# Patient Record
Sex: FEMALE | Race: ASIAN | NOT HISPANIC OR LATINO | Employment: FULL TIME
[De-identification: names, ages, dates, MRNs, and addresses within clinical notes are randomized per-mention and may not be internally consistent; named-entity substitution may affect disease eponyms.]

---

## 2017-09-23 ENCOUNTER — HEALTH MAINTENANCE LETTER (OUTPATIENT)
Age: 27
End: 2017-09-23

## 2017-12-26 ENCOUNTER — OFFICE VISIT (OUTPATIENT)
Dept: INTERNAL MEDICINE | Facility: CLINIC | Age: 27
End: 2017-12-26
Payer: COMMERCIAL

## 2017-12-26 VITALS
OXYGEN SATURATION: 97 % | WEIGHT: 229.7 LBS | SYSTOLIC BLOOD PRESSURE: 120 MMHG | BODY MASS INDEX: 36.92 KG/M2 | TEMPERATURE: 98.4 F | DIASTOLIC BLOOD PRESSURE: 82 MMHG | HEART RATE: 88 BPM | HEIGHT: 66 IN

## 2017-12-26 DIAGNOSIS — Z13.1 SCREENING FOR DIABETES MELLITUS: ICD-10-CM

## 2017-12-26 DIAGNOSIS — D50.8 OTHER IRON DEFICIENCY ANEMIA: ICD-10-CM

## 2017-12-26 DIAGNOSIS — Z13.220 SCREENING FOR HYPERLIPIDEMIA: ICD-10-CM

## 2017-12-26 DIAGNOSIS — Z00.00 ENCOUNTER FOR ROUTINE ADULT HEALTH EXAMINATION WITHOUT ABNORMAL FINDINGS: Primary | ICD-10-CM

## 2017-12-26 DIAGNOSIS — N92.6 IRREGULAR PERIODS: ICD-10-CM

## 2017-12-26 DIAGNOSIS — E66.01 MORBID OBESITY DUE TO EXCESS CALORIES (H): ICD-10-CM

## 2017-12-26 DIAGNOSIS — F33.0 MILD RECURRENT MAJOR DEPRESSION (H): ICD-10-CM

## 2017-12-26 DIAGNOSIS — Z13.29 SCREENING FOR THYROID DISORDER: ICD-10-CM

## 2017-12-26 LAB
BASOPHILS # BLD AUTO: 0 10E9/L (ref 0–0.2)
BASOPHILS NFR BLD AUTO: 0.2 %
DIFFERENTIAL METHOD BLD: ABNORMAL
EOSINOPHIL # BLD AUTO: 0.4 10E9/L (ref 0–0.7)
EOSINOPHIL NFR BLD AUTO: 2.7 %
ERYTHROCYTE [DISTWIDTH] IN BLOOD BY AUTOMATED COUNT: 15.1 % (ref 10–15)
HBA1C MFR BLD: 5.4 % (ref 4.3–6)
HCT VFR BLD AUTO: 33.4 % (ref 35–47)
HGB BLD-MCNC: 10.9 G/DL (ref 11.7–15.7)
LYMPHOCYTES # BLD AUTO: 4.1 10E9/L (ref 0.8–5.3)
LYMPHOCYTES NFR BLD AUTO: 29.3 %
MCH RBC QN AUTO: 25.7 PG (ref 26.5–33)
MCHC RBC AUTO-ENTMCNC: 32.6 G/DL (ref 31.5–36.5)
MCV RBC AUTO: 79 FL (ref 78–100)
MONOCYTES # BLD AUTO: 1 10E9/L (ref 0–1.3)
MONOCYTES NFR BLD AUTO: 6.8 %
NEUTROPHILS # BLD AUTO: 8.5 10E9/L (ref 1.6–8.3)
NEUTROPHILS NFR BLD AUTO: 61 %
PLATELET # BLD AUTO: 479 10E9/L (ref 150–450)
RBC # BLD AUTO: 4.24 10E12/L (ref 3.8–5.2)
WBC # BLD AUTO: 13.9 10E9/L (ref 4–11)

## 2017-12-26 PROCEDURE — 84443 ASSAY THYROID STIM HORMONE: CPT | Performed by: INTERNAL MEDICINE

## 2017-12-26 PROCEDURE — 83540 ASSAY OF IRON: CPT | Performed by: INTERNAL MEDICINE

## 2017-12-26 PROCEDURE — 83550 IRON BINDING TEST: CPT | Performed by: INTERNAL MEDICINE

## 2017-12-26 PROCEDURE — 36415 COLL VENOUS BLD VENIPUNCTURE: CPT | Performed by: INTERNAL MEDICINE

## 2017-12-26 PROCEDURE — 85025 COMPLETE CBC W/AUTO DIFF WBC: CPT | Performed by: INTERNAL MEDICINE

## 2017-12-26 PROCEDURE — 82728 ASSAY OF FERRITIN: CPT | Performed by: INTERNAL MEDICINE

## 2017-12-26 PROCEDURE — 80061 LIPID PANEL: CPT | Performed by: INTERNAL MEDICINE

## 2017-12-26 PROCEDURE — 83036 HEMOGLOBIN GLYCOSYLATED A1C: CPT | Performed by: INTERNAL MEDICINE

## 2017-12-26 PROCEDURE — 99395 PREV VISIT EST AGE 18-39: CPT | Performed by: INTERNAL MEDICINE

## 2017-12-26 NOTE — NURSING NOTE
"Chief Complaint   Patient presents with     Physical     Fasting.       Initial /82 (BP Location: Left arm, Patient Position: Sitting, Cuff Size: Adult Large)  Pulse 88  Temp 98.4  F (36.9  C) (Oral)  Ht 5' 6\" (1.676 m)  Wt 229 lb 11.2 oz (104.2 kg)  LMP 12/04/2017 (Exact Date)  SpO2 97%  BMI 37.07 kg/m2 Estimated body mass index is 37.07 kg/(m^2) as calculated from the following:    Height as of this encounter: 5' 6\" (1.676 m).    Weight as of this encounter: 229 lb 11.2 oz (104.2 kg).  Medication Reconciliation: complete   Zita Kerr MA      "

## 2017-12-26 NOTE — MR AVS SNAPSHOT
After Visit Summary   12/26/2017    Karen Orellana    MRN: [id number]           Patient Information     Date Of Birth          1990        Visit Information        Provider Department      12/26/2017 4:40 PM Chase Villafuerte MD [facility]        Today's Diagnoses     Other iron deficiency anemia    -  1    Screening for hyperlipidemia        Morbid obesity due to excess calories (H)        Screening for diabetes mellitus        Screening for thyroid disorder          Care Instructions                           Plantar Fasciitis   What is plantar fasciitis?   Plantar fasciitis is a painful inflammation of the bottom of the foot between the ball of the foot and the heel.   How does it occur?   There are several possible causes of plantar fasciitis, including:     wearing high heels     gaining weight     increased walking, standing, or stair-climbing   If you wear high-heeled shoes, including western-style boots, for long periods of time, the tough, tendonlike tissue of the bottom of your foot can become shorter. This layer of tissue is called fascia. Pain occurs when you stretch fascia that has shortened. This painful stretching might happen, for example, when you walk barefoot after getting out of bed in the morning.   If you gain weight, you might be more likely to have plantar fasciitis, especially if you walk a lot or  shoes with poor heel cushioning. Normally there is a pad of fatty tissue under your heel bone. Weight gain might break down this fat pad and cause heel pain.   Runners may get plantar fasciitis when they change their workout and increase their mileage or frequency of workouts. It can also occur with a change in exercise surface or terrain, or if your shoes are worn out and don't provide enough cushion for your heels.   If the arches of your foot are abnormally high or low, you are more likely to develop plantar fasciitis than if your arches are  normal.   What are the symptoms?   The main symptom of plantar fasciitis is heel pain when you walk. You may also feel pain when you stand and possibly even when you are resting. This pain typically occurs first thing in the morning after you get out of bed, when your foot is placed flat on the floor. The pain occurs because you are stretching the plantar fascia. The pain usually lessens with more walking, but you may have it again after periods of rest.   You may feel no pain when you are sleeping because the position of your feet during rest allows the fascia to shorten and relax.   How is it diagnosed?   Your healthcare provider will ask about your symptoms. He or she will ask if the bottom of your heel is tender and if you have pain when you stretch the bottom of your foot. An X-ray of your heel may be done.   How is it treated?     Give your painful heel lots of rest. You may need to stay completely off your foot for several days when the pain is severe.     Your healthcare provider may recommend or prescribe anti-inflammatory medicines, such as aspirin or ibuprofen. These drugs decrease pain and inflammation. Adults aged 65 years and older should not take non-steroidal anti-inflammatory medicine for more than 7 days without their healthcare provider's approval. Resting your heel on an ice pack for a few minutes several times a day can also help.     Try to cushion your foot. You can do this by wearing athletic shoes, even at work, for awhile. Heel cushions can also be used. The cushions should be worn in both shoes. They are most helpful if you are overweight or an older adult.     Your provider may recommend special arch supports or inserts for your shoes called orthotics, either custom-made or off the shelf. These supports can be particularly helpful if you have flat feet or high arches.     Your provider may recommend an injection of a cortisone-like medicine.     Lose weight if needed.     A night splint  may be recommended. This will keep the plantar fascia stretched while you are sleeping.     Physical therapy for additional treatments may be recommended     Surgery is rarely needed.   How long will the effects last?   You may find that the pain is sometimes worse and sometimes better over time. If you get treatment soon after you notice the pain, the symptoms should stop after several weeks. If, however, you have had plantar fasciitis for a long time, it may take many weeks to months for the pain to go away.   When can I return to my normal activities?   Everyone recovers from an injury at a different rate. Return to your activities will be determined by how soon your foot recovers, not by how many days or weeks it has been since your injury has occurred. In general, the longer you have symptoms before you start treatment, the longer it will take to get better. The goal of rehabilitation is to return you to your normal activities as soon as is safely possible. If you return too soon you may worsen your injury.   You may safely return to your activities when, starting from the top of the list and progressing to the end, each of the following is true:     You have full range of motion in the injured foot compared with the uninjured foot.     You have full strength of the injured foot compared with the uninjured foot.     You can walk straight ahead without significant pain or limping.   How can I prevent plantar fasciitis?   The best way to prevent plantar fasciitis is to wear shoes that are well made and fit your feet. This is especially important when you exercise or walk a lot or stand for a long time on hard surfaces. Get new athletic shoes before your old shoes stop supporting and cushioning your feet.   You should also:     Avoid repeated jarring to the heel.     Keep a healthy weight.     Do your leg and foot stretching exercises regularly.   Developed by [employer].   Published by [employer].   Last  modified: 2008-08-11   Last reviewed: 2008-07-07   This content is reviewed periodically and is subject to change as new health information becomes available. The information is intended to inform and educate and is not a replacement for medical evaluation, advice, diagnosis or treatment by a healthcare professional.   Sports Medicine Advisor 2009.1 Index  Sports Medicine Advisor 2009.1 Credits     2009 [facility] and/or its affiliates. All Rights Reserved.               Plantar Fasciitis Rehabilitation Exercises   You may begin exercising the muscles of your foot right away by gently stretching them as follows:     Prone hip extension: Lie on your stomach with your legs straight out behind you. Tighten the buttocks and thigh muscles of your injured leg and lift it off the floor about 8 inches. Keep your knee straight. Hold for 5 seconds. Then lower your leg and relax. Do 3 sets of 10.     Towel stretch: Sit on a hard surface with one leg stretched out in front of you. Loop a towel around your toes and the ball of your foot and pull the towel toward your body keeping your knee straight. Hold this position for 15 to 30 seconds then relax. Repeat 3 times.   When the towel stretch becomes too easy, you may begin doing the standing calf stretch.     Standing calf stretch: Facing a wall, put your hands against the wall at about eye level. Keep one leg back with the heel on the floor, and the other leg forward. Turn your back foot slightly inward (as if you were pigeon-toed) as you slowly lean into the wall until you feel a stretch in the back of your calf. Hold for 15 to 30 seconds. Repeat 3 times and then switch the position of your legs and repeat the exercise 3 times. Do this exercise several times each day.     Sitting plantar fascia stretch: Sit in a chair and cross one foot over your other knee. Grab the base of your toes and pull them back toward your leg until you feel a comfortable stretch. Hold 15 seconds and  repeat 3 times.   When you can stand comfortably on your injured foot, you can begin standing to stretch the bottom of your foot using the plantar fascia stretch.     Achilles stretch: Stand with the ball of one foot on a stair. Reach for the bottom step with your heel until you feel a stretch in the arch of your foot. Hold this position for 15 to 30 seconds and then relax. Repeat 3 times.   After you have stretched the bottom muscles of your foot, you can begin strengthening the top muscles of your foot.     Frozen can roll: Roll your bare injured foot back and forth from your heel to your mid-arch over a frozen juice can. Repeat for 3 to 5 minutes. This exercise is particularly helpful if done first thing in the morning.     Towel pickup: With your heel on the ground,  a towel with your toes. Release. Repeat 10 to 20 times. When this gets easy, add more resistance by placing a book or small weight on the towel.     Balance and reach exercises   Stand upright next to a chair with your injured leg farthest from the chair. This will provide you with support if you need it. Stand just on the foot of your injured leg. Try to raise the arch of this foot while keeping your toes on the floor.   A. Keep your foot in this position and reach forward in front of you with the hand farthest away from the chair, allowing your knee to bend. Repeat this 10 times while maintaining the arch height. This exercise can be made more difficult by reaching farther in front of you. Do 2 sets.   B.  the same position as above. While maintaining your arch height, reach the hand farthest away from the chair across your body toward the chair. The farther you reach, the more challenging the exercise. Do 2 sets of 10.     Heel raise: Balance yourself while standing behind a chair or counter. Using the chair to help you, raise your body up onto your toes and hold for 5 seconds. Then slowly lower yourself down without holding onto  the chair. Hold onto the chair or counter if you need to. When this exercise becomes less painful, try lowering on one leg only. Repeat 10 times. Do 3 sets of 10.     Side-lying leg lift: Lying on your uninjured side, tighten the front thigh muscles on your top leg and lift that leg 8 to 10 inches away from the other leg. Keep the leg straight and lower slowly. Do 3 sets of 10.   Written by Christie Hickman, MS, PT, and Iveth Fonseca PT, [facility], [facility], for [employer].   Published by [employer].   Last modified: 2009-02-09   Last reviewed: 2008-07-07   This content is reviewed periodically and is subject to change as new health information becomes available. The information is intended to inform and educate and is not a replacement for medical evaluation, advice, diagnosis or treatment by a healthcare professional.   Sports Medicine Advisor 2009.1 Index                    Preventive Health Recommendations  Female Ages 26 - 39  Yearly exam:   See your health care provider every year in order to    Review health changes.     Discuss preventive care.      Review your medicines if you your doctor has prescribed any.    Until age 30: Get a Pap test every three years (more often if you have had an abnormal result).    After age 30: Talk to your doctor about whether you should have a Pap test every 3 years or have a Pap test with HPV screening every 5 years.   You do not need a Pap test if your uterus was removed (hysterectomy) and you have not had cancer.  You should be tested each year for STDs (sexually transmitted diseases), if you're at risk.   Talk to your provider about how often to have your cholesterol checked.  If you are at risk for diabetes, you should have a diabetes test (fasting glucose).  Shots: Get a flu shot each year. Get a tetanus shot every 10 years.   Nutrition:     Eat at least 5 servings of fruits and vegetables each day.    Eat whole-grain bread, whole-wheat pasta and brown rice instead of white grains  and rice.    Talk to your provider about Calcium and Vitamin D.     Lifestyle    Exercise at least 150 minutes a week (30 minutes a day, 5 days of the week). This will help you control your weight and prevent disease.    Limit alcohol to one drink per day.    No smoking.     Wear sunscreen to prevent skin cancer.    See your dentist every six months for an exam and cleaning.            Follow-ups after your visit        Additional Services     NUTRITION REFERRAL       Your provider has referred you to: [facility]: [facility] [phone]   [email or web address]    Please be aware that coverage of these services is subject to the terms and limitations of your health insurance plan.  Call member services at your health plan with any benefit or coverage questions.      Please bring the following with you to your appointment:    (1) This referral request  (2) Any documents given to you regarding this referral  (3) Any specific questions you have about diet and/or food choices                  Who to contact     If you have questions or need follow up information about today's clinic visit or your schedule please contact [facility] directly at [phone].  Normal or non-critical lab and imaging results will be communicated to you by MyChart, letter or phone within 4 business days after the clinic has received the results. If you do not hear from us within 7 days, please contact the clinic through [employer] or phone. If you have a critical or abnormal lab result, we will notify you by phone as soon as possible.  Submit refill requests through [employer] or call your pharmacy and they will forward the refill request to us. Please allow 3 business days for your refill to be completed.          Additional Information About Your Visit        [employer] Information     [employer] gives you secure access to your electronic health record. If you see a primary care provider,  "you can also send messages to your care team and make appointments. If you have questions, please call your primary care clinic.  If you do not have a primary care provider, please call [phone] and they will assist you.        Care EveryWhere ID     This is your Care EveryWhere ID. This could be used by other organizations to access your [facility] medical records  [id number]        Your Vitals Were     Pulse Temperature Height Last Period Pulse Oximetry BMI (Body Mass Index)    88 98.4  F (36.9  C) (Oral) 5' 6\" (1.676 m) 12/04/2017 (Exact Date) 97% 37.07 kg/m2       Blood Pressure from Last 3 Encounters:   12/26/17 120/82   09/01/16 120/84   08/03/16 120/82    Weight from Last 3 Encounters:   12/26/17 229 lb 11.2 oz (104.2 kg)   08/03/16 235 lb (106.6 kg)   01/06/15 212 lb 11.2 oz (96.5 kg)              We Performed the Following     CBC with platelets differential     Ferritin     Hemoglobin A1c     Iron and iron binding capacity     Lipid panel reflex to direct LDL Fasting     NUTRITION REFERRAL     TSH with free T4 reflex        Primary Care Provider Office Phone # Fax #    Nathaniel Cam [phone]       XXX RESIGNED  [address] [facility] [address]  [facility] [address]        Equal Access to Services     YVETTE CLEARY : Hadii aad ku hadasho Soomaali, waaxda luqadaha, qaybta kaalmada adeegyada, tawanda dash. So [facility] [phone].    ATENCIÓN: Si habla español, tiene a velazquez disposición servicios gratuitos de asistencia lingüística. Jonn al [phone].    We comply with applicable federal civil rights laws and [place] laws. We do not discriminate on the basis of race, color, national origin, age, disability, sex, sexual orientation, or gender identity.            Thank you!     Thank you for choosing [facility]  for your care. Our goal is always to provide you with excellent care. Hearing back from our patients is one way we can continue to " improve our services. Please take a few minutes to complete the written survey that you may receive in the mail after your visit with us. Thank you!             Your Updated Medication List - Protect others around you: Learn how to safely use, store and throw away your medicines at www.disposemymeds.org.          This list is accurate as of: 12/26/17  5:30 PM.  Always use your most recent med list.                   Brand Name Dispense Instructions for use Diagnosis    NO ACTIVE MEDICATIONS       Need for prophylactic vaccination and inoculation against influenza

## 2017-12-26 NOTE — PATIENT INSTRUCTIONS
Plantar Fasciitis   What is plantar fasciitis?   Plantar fasciitis is a painful inflammation of the bottom of the foot between the ball of the foot and the heel.   How does it occur?   There are several possible causes of plantar fasciitis, including:     wearing high heels     gaining weight     increased walking, standing, or stair-climbing   If you wear high-heeled shoes, including western-style boots, for long periods of time, the tough, tendonlike tissue of the bottom of your foot can become shorter. This layer of tissue is called fascia. Pain occurs when you stretch fascia that has shortened. This painful stretching might happen, for example, when you walk barefoot after getting out of bed in the morning.   If you gain weight, you might be more likely to have plantar fasciitis, especially if you walk a lot or  shoes with poor heel cushioning. Normally there is a pad of fatty tissue under your heel bone. Weight gain might break down this fat pad and cause heel pain.   Runners may get plantar fasciitis when they change their workout and increase their mileage or frequency of workouts. It can also occur with a change in exercise surface or terrain, or if your shoes are worn out and don't provide enough cushion for your heels.   If the arches of your foot are abnormally high or low, you are more likely to develop plantar fasciitis than if your arches are normal.   What are the symptoms?   The main symptom of plantar fasciitis is heel pain when you walk. You may also feel pain when you stand and possibly even when you are resting. This pain typically occurs first thing in the morning after you get out of bed, when your foot is placed flat on the floor. The pain occurs because you are stretching the plantar fascia. The pain usually lessens with more walking, but you may have it again after periods of rest.   You may feel no pain when you are sleeping because the position of your feet  during rest allows the fascia to shorten and relax.   How is it diagnosed?   Your healthcare provider will ask about your symptoms. He or she will ask if the bottom of your heel is tender and if you have pain when you stretch the bottom of your foot. An X-ray of your heel may be done.   How is it treated?     Give your painful heel lots of rest. You may need to stay completely off your foot for several days when the pain is severe.     Your healthcare provider may recommend or prescribe anti-inflammatory medicines, such as aspirin or ibuprofen. These drugs decrease pain and inflammation. Adults aged 65 years and older should not take non-steroidal anti-inflammatory medicine for more than 7 days without their healthcare provider's approval. Resting your heel on an ice pack for a few minutes several times a day can also help.     Try to cushion your foot. You can do this by wearing athletic shoes, even at work, for awhile. Heel cushions can also be used. The cushions should be worn in both shoes. They are most helpful if you are overweight or an older adult.     Your provider may recommend special arch supports or inserts for your shoes called orthotics, either custom-made or off the shelf. These supports can be particularly helpful if you have flat feet or high arches.     Your provider may recommend an injection of a cortisone-like medicine.     Lose weight if needed.     A night splint may be recommended. This will keep the plantar fascia stretched while you are sleeping.     Physical therapy for additional treatments may be recommended     Surgery is rarely needed.   How long will the effects last?   You may find that the pain is sometimes worse and sometimes better over time. If you get treatment soon after you notice the pain, the symptoms should stop after several weeks. If, however, you have had plantar fasciitis for a long time, it may take many weeks to months for the pain to go away.   When can I return to  my normal activities?   Everyone recovers from an injury at a different rate. Return to your activities will be determined by how soon your foot recovers, not by how many days or weeks it has been since your injury has occurred. In general, the longer you have symptoms before you start treatment, the longer it will take to get better. The goal of rehabilitation is to return you to your normal activities as soon as is safely possible. If you return too soon you may worsen your injury.   You may safely return to your activities when, starting from the top of the list and progressing to the end, each of the following is true:     You have full range of motion in the injured foot compared with the uninjured foot.     You have full strength of the injured foot compared with the uninjured foot.     You can walk straight ahead without significant pain or limping.   How can I prevent plantar fasciitis?   The best way to prevent plantar fasciitis is to wear shoes that are well made and fit your feet. This is especially important when you exercise or walk a lot or stand for a long time on hard surfaces. Get new athletic shoes before your old shoes stop supporting and cushioning your feet.   You should also:     Avoid repeated jarring to the heel.     Keep a healthy weight.     Do your leg and foot stretching exercises regularly.   Developed by [employer].   Published by [employer].   Last modified: 2008-08-11   Last reviewed: 2008-07-07   This content is reviewed periodically and is subject to change as new health information becomes available. The information is intended to inform and educate and is not a replacement for medical evaluation, advice, diagnosis or treatment by a healthcare professional.   Sports Medicine Advisor 2009.1 Index  Sports Medicine Advisor 2009.1 Credits     2009 [employer] and/or its affiliates. All Rights Reserved.               Plantar Fasciitis Rehabilitation Exercises   You may begin  exercising the muscles of your foot right away by gently stretching them as follows:     Prone hip extension: Lie on your stomach with your legs straight out behind you. Tighten the buttocks and thigh muscles of your injured leg and lift it off the floor about 8 inches. Keep your knee straight. Hold for 5 seconds. Then lower your leg and relax. Do 3 sets of 10.     Towel stretch: Sit on a hard surface with one leg stretched out in front of you. Loop a towel around your toes and the ball of your foot and pull the towel toward your body keeping your knee straight. Hold this position for 15 to 30 seconds then relax. Repeat 3 times.   When the towel stretch becomes too easy, you may begin doing the standing calf stretch.     Standing calf stretch: Facing a wall, put your hands against the wall at about eye level. Keep one leg back with the heel on the floor, and the other leg forward. Turn your back foot slightly inward (as if you were pigeon-toed) as you slowly lean into the wall until you feel a stretch in the back of your calf. Hold for 15 to 30 seconds. Repeat 3 times and then switch the position of your legs and repeat the exercise 3 times. Do this exercise several times each day.     Sitting plantar fascia stretch: Sit in a chair and cross one foot over your other knee. Grab the base of your toes and pull them back toward your leg until you feel a comfortable stretch. Hold 15 seconds and repeat 3 times.   When you can stand comfortably on your injured foot, you can begin standing to stretch the bottom of your foot using the plantar fascia stretch.     Achilles stretch: Stand with the ball of one foot on a stair. Reach for the bottom step with your heel until you feel a stretch in the arch of your foot. Hold this position for 15 to 30 seconds and then relax. Repeat 3 times.   After you have stretched the bottom muscles of your foot, you can begin strengthening the top muscles of your foot.     Frozen can roll: Roll  your bare injured foot back and forth from your heel to your mid-arch over a frozen juice can. Repeat for 3 to 5 minutes. This exercise is particularly helpful if done first thing in the morning.     Towel pickup: With your heel on the ground,  a towel with your toes. Release. Repeat 10 to 20 times. When this gets easy, add more resistance by placing a book or small weight on the towel.     Balance and reach exercises   Stand upright next to a chair with your injured leg farthest from the chair. This will provide you with support if you need it. Stand just on the foot of your injured leg. Try to raise the arch of this foot while keeping your toes on the floor.   A. Keep your foot in this position and reach forward in front of you with the hand farthest away from the chair, allowing your knee to bend. Repeat this 10 times while maintaining the arch height. This exercise can be made more difficult by reaching farther in front of you. Do 2 sets.   B.  the same position as above. While maintaining your arch height, reach the hand farthest away from the chair across your body toward the chair. The farther you reach, the more challenging the exercise. Do 2 sets of 10.     Heel raise: Balance yourself while standing behind a chair or counter. Using the chair to help you, raise your body up onto your toes and hold for 5 seconds. Then slowly lower yourself down without holding onto the chair. Hold onto the chair or counter if you need to. When this exercise becomes less painful, try lowering on one leg only. Repeat 10 times. Do 3 sets of 10.     Side-lying leg lift: Lying on your uninjured side, tighten the front thigh muscles on your top leg and lift that leg 8 to 10 inches away from the other leg. Keep the leg straight and lower slowly. Do 3 sets of 10.   Written by Christie Hickman, MS, PT, and Iveth Fonseca PT, [facility], OCS, for [employer].   Published by [employer].   Last modified: 2009-02-09   Last  reviewed: 2008-07-07   This content is reviewed periodically and is subject to change as new health information becomes available. The information is intended to inform and educate and is not a replacement for medical evaluation, advice, diagnosis or treatment by a healthcare professional.   Sports Medicine Advisor 2009.1 Index                    Preventive Health Recommendations  Female Ages 26 - 39  Yearly exam:   See your health care provider every year in order to    Review health changes.     Discuss preventive care.      Review your medicines if you your doctor has prescribed any.    Until age 30: Get a Pap test every three years (more often if you have had an abnormal result).    After age 30: Talk to your doctor about whether you should have a Pap test every 3 years or have a Pap test with HPV screening every 5 years.   You do not need a Pap test if your uterus was removed (hysterectomy) and you have not had cancer.  You should be tested each year for STDs (sexually transmitted diseases), if you're at risk.   Talk to your provider about how often to have your cholesterol checked.  If you are at risk for diabetes, you should have a diabetes test (fasting glucose).  Shots: Get a flu shot each year. Get a tetanus shot every 10 years.   Nutrition:     Eat at least 5 servings of fruits and vegetables each day.    Eat whole-grain bread, whole-wheat pasta and brown rice instead of white grains and rice.    Talk to your provider about Calcium and Vitamin D.     Lifestyle    Exercise at least 150 minutes a week (30 minutes a day, 5 days of the week). This will help you control your weight and prevent disease.    Limit alcohol to one drink per day.    No smoking.     Wear sunscreen to prevent skin cancer.    See your dentist every six months for an exam and cleaning.

## 2017-12-27 LAB
CHOLEST SERPL-MCNC: 155 MG/DL
FERRITIN SERPL-MCNC: 8 NG/ML (ref 12–150)
HDLC SERPL-MCNC: 54 MG/DL
IRON SATN MFR SERPL: 7 % (ref 15–46)
IRON SERPL-MCNC: 33 UG/DL (ref 35–180)
LDLC SERPL CALC-MCNC: 89 MG/DL
NONHDLC SERPL-MCNC: 101 MG/DL
TIBC SERPL-MCNC: 470 UG/DL (ref 240–430)
TRIGL SERPL-MCNC: 62 MG/DL
TSH SERPL DL<=0.005 MIU/L-ACNC: 1.08 MU/L (ref 0.4–4)

## 2018-02-20 ENCOUNTER — TELEPHONE (OUTPATIENT)
Dept: INTERNAL MEDICINE | Facility: CLINIC | Age: 28
End: 2018-02-20

## 2018-02-20 NOTE — TELEPHONE ENCOUNTER
Panel Management Review      Patient has the following on her problem list: None      Composite cancer screening  Chart review shows that this patient is due/due soon for the following Pap Smear  Summary:    Patient is due/failing the following:   PAP    Action needed:   Patient needs office visit for Pap Smear.    Type of outreach:    Patient's VM is full, can't leave message.    Questions for provider review:    None                                                                                                                                    Zita Kerr MA       Chart routed to Care Team .

## 2018-02-20 NOTE — LETTER
[facility]  [address], [address]  [place] [address]  [phone]        August 22, 2018    Karen Orellana  [address] [address] [facility] [address]            Dear Ms. Karen Orellana:    We sent you a letter a couple of weeks ago informing you of health maintenance that is due. We hope that you received it. This letter is just a follow up to remind you to schedule an appointment.         Sincerely,        Your [facility] Care Team

## 2018-02-20 NOTE — LETTER
[facility]  [address], [address]  [place] [address]  [phone]        July 26, 2018    Karen Orellana  [address] [address] [address]            Dear Ms. Karen Orellana:    In order to ensure we are providing the best quality care, we have reviewed your chart and see that you are due for:    1. Physical   2. Pap      Please call the clinic at your earliest convenience to schedule an appointment.   Thank you for trusting us with your health care.    Sincerely,        Dr. Chase Villafuerte

## 2018-03-12 ENCOUNTER — OFFICE VISIT (OUTPATIENT)
Dept: INTERNAL MEDICINE | Facility: CLINIC | Age: 28
End: 2018-03-12
Payer: COMMERCIAL

## 2018-03-12 VITALS
OXYGEN SATURATION: 100 % | WEIGHT: 232.1 LBS | BODY MASS INDEX: 37.3 KG/M2 | DIASTOLIC BLOOD PRESSURE: 76 MMHG | HEIGHT: 66 IN | SYSTOLIC BLOOD PRESSURE: 112 MMHG | TEMPERATURE: 98.6 F | HEART RATE: 100 BPM

## 2018-03-12 DIAGNOSIS — R31.9 HEMATURIA, UNSPECIFIED TYPE: Primary | ICD-10-CM

## 2018-03-12 DIAGNOSIS — Z12.4 ENCOUNTER FOR PAPANICOLAOU SMEAR FOR CERVICAL CANCER SCREENING: ICD-10-CM

## 2018-03-12 DIAGNOSIS — D50.0 IRON DEFICIENCY ANEMIA DUE TO CHRONIC BLOOD LOSS: ICD-10-CM

## 2018-03-12 DIAGNOSIS — G43.009 MIGRAINE WITHOUT AURA AND WITHOUT STATUS MIGRAINOSUS, NOT INTRACTABLE: ICD-10-CM

## 2018-03-12 DIAGNOSIS — N89.8 VAGINAL DISCHARGE: ICD-10-CM

## 2018-03-12 LAB
ALBUMIN UR-MCNC: ABNORMAL MG/DL
APPEARANCE UR: CLEAR
BACTERIA #/AREA URNS HPF: ABNORMAL /HPF
BILIRUB UR QL STRIP: NEGATIVE
BLD GP AB SCN SERPL QL: NORMAL
BLOOD BANK CMNT PATIENT-IMP: NORMAL
COLOR UR AUTO: YELLOW
DAT IGG-SP REAG RBC-IMP: NORMAL
DAT POLY-SP REAG RBC QL: NORMAL
GLUCOSE UR STRIP-MCNC: NEGATIVE MG/DL
HGB UR QL STRIP: NEGATIVE
KETONES UR STRIP-MCNC: ABNORMAL MG/DL
LEUKOCYTE ESTERASE UR QL STRIP: NEGATIVE
MUCOUS THREADS #/AREA URNS LPF: PRESENT /LPF
NITRATE UR QL: NEGATIVE
NON-SQ EPI CELLS #/AREA URNS LPF: ABNORMAL /LPF
PH UR STRIP: 7 PH (ref 5–7)
RBC #/AREA URNS AUTO: ABNORMAL /HPF
RETICS # AUTO: 62.7 10E9/L (ref 25–95)
RETICS/RBC NFR AUTO: 1.5 % (ref 0.5–2)
SOURCE: ABNORMAL
SP GR UR STRIP: 1.02 (ref 1–1.03)
SPECIMEN SOURCE: NORMAL
UROBILINOGEN UR STRIP-ACNC: 1 EU/DL (ref 0.2–1)
WBC #/AREA URNS AUTO: ABNORMAL /HPF
WET PREP SPEC: NORMAL

## 2018-03-12 PROCEDURE — 82728 ASSAY OF FERRITIN: CPT | Performed by: INTERNAL MEDICINE

## 2018-03-12 PROCEDURE — 99214 OFFICE O/P EST MOD 30 MIN: CPT | Performed by: INTERNAL MEDICINE

## 2018-03-12 PROCEDURE — G0145 SCR C/V CYTO,THINLAYER,RESCR: HCPCS | Performed by: INTERNAL MEDICINE

## 2018-03-12 PROCEDURE — 85025 COMPLETE CBC W/AUTO DIFF WBC: CPT | Performed by: INTERNAL MEDICINE

## 2018-03-12 PROCEDURE — 36415 COLL VENOUS BLD VENIPUNCTURE: CPT | Performed by: INTERNAL MEDICINE

## 2018-03-12 PROCEDURE — 85060 BLOOD SMEAR INTERPRETATION: CPT | Performed by: PATHOLOGY

## 2018-03-12 PROCEDURE — 87210 SMEAR WET MOUNT SALINE/INK: CPT | Performed by: INTERNAL MEDICINE

## 2018-03-12 PROCEDURE — 86850 RBC ANTIBODY SCREEN: CPT | Performed by: INTERNAL MEDICINE

## 2018-03-12 PROCEDURE — 85045 AUTOMATED RETICULOCYTE COUNT: CPT | Performed by: INTERNAL MEDICINE

## 2018-03-12 PROCEDURE — 83010 ASSAY OF HAPTOGLOBIN QUANT: CPT | Performed by: INTERNAL MEDICINE

## 2018-03-12 PROCEDURE — 81001 URINALYSIS AUTO W/SCOPE: CPT | Performed by: INTERNAL MEDICINE

## 2018-03-12 PROCEDURE — 83615 LACTATE (LD) (LDH) ENZYME: CPT | Performed by: INTERNAL MEDICINE

## 2018-03-12 NOTE — MR AVS SNAPSHOT
After Visit Summary   3/12/2018    Karen Orellana    MRN: [id number]           Patient Information     Date Of Birth          1990        Visit Information        Provider Department      3/12/2018 3:45 PM Chase Villafuerte MD [facility]        Today's Diagnoses     Hematuria, unspecified type    -  1    Iron deficiency anemia due to chronic blood loss        Migraine without aura and without status migrainosus, not intractable        Encounter for Papanicolaou smear for cervical cancer screening        Vaginal discharge           Follow-ups after your visit        Additional Services     ONC/HEME ADULT REFERRAL       Your provider has referred you to: [facility]: Cancer Care/Hematology (All Cancer Related Services) - [place] [phone]   [email or web address]    Please be aware that coverage of these services is subject to the terms and limitations of your health insurance plan.  Call member services at your health plan with any benefit or coverage questions.      Please bring the following with you to your appointment:    (1) Any X-Rays, CTs or MRIs which have been performed.  Contact the facility where they were done to arrange for  prior to your scheduled appointment.   (2) List of current medications  (3) This referral request   (4) Any documents/labs given to you for this referral                  Who to contact     If you have questions or need follow up information about today's clinic visit or your schedule please contact [facility] directly at [phone].  Normal or non-critical lab and imaging results will be communicated to you by MyChart, letter or phone within 4 business days after the clinic has received the results. If you do not hear from us within 7 days, please contact the clinic through MyChart or phone. If you have a critical or abnormal lab result, we will notify you by phone as  "soon as possible.  Submit refill requests through Your.MD or call your pharmacy and they will forward the refill request to us. Please allow 3 business days for your refill to be completed.          Additional Information About Your Visit        [employer] Information     [employer] gives you secure access to your electronic health record. If you see a primary care provider, you can also send messages to your care team and make appointments. If you have questions, please call your primary care clinic.  If you do not have a primary care provider, please call [phone] and they will assist you.        Care EveryWhere ID     This is your Care EveryWhere ID. This could be used by other organizations to access your [facility] medical records  [id number]        Your Vitals Were     Pulse Temperature Height Last Period Pulse Oximetry BMI (Body Mass Index)    100 98.6  F (37  C) (Oral) 5' 6\" (1.676 m) 02/23/2018 (Exact Date) 100% 37.46 kg/m2       Blood Pressure from Last 3 Encounters:   03/12/18 112/76   12/26/17 120/82   09/01/16 120/84    Weight from Last 3 Encounters:   03/12/18 232 lb 1.6 oz (105.3 kg)   12/26/17 229 lb 11.2 oz (104.2 kg)   08/03/16 235 lb (106.6 kg)              We Performed the Following     *UA reflex to Microscopic and Culture ([place] [facility] (except [place] and [place])     Blood Morphology Pathologist Review     CBC with platelets differential     Direct antiglobulin test     Ferritin     Haptoglobin     Lactate Dehydrogenase     ONC/HEME ADULT REFERRAL     Pap imaged thin layer screen reflex to HPV if ASCUS - recommend age 25 - 29     Reticulocyte count     Wet prep          Today's Medication Changes          These changes are accurate as of 3/12/18  4:47 PM.  If you have any questions, ask your nurse or doctor.               Start taking these medicines.        Dose/Directions    aspirin-acetaminophen-caffeine 250-250-65 MG per tablet   Commonly known as:  EXCEDRIN MIGRAINE   Used " for:  Migraine without aura and without status migrainosus, not intractable   Started by:  Chase Villafuerte MD        Dose:  1 tablet   Take 1 tablet by mouth every 6 hours as needed for headaches   Quantity:  80 tablet   Refills:  1            Where to get your medicines      These medications were sent to [facility] [address] [facility] [address] AT [place] [address]  [address] [facility] [address]    Hours:  24-hours Phone:  [phone]     aspirin-acetaminophen-caffeine 250-250-65 MG per tablet                Primary Care Provider Office Phone # Fax #    Chase Villafuerte [phone]       [address]  [facility] [address]        Equal Access to Services     CAROLIN CLEARY : Jose Juan layo Sohannahali, waaxda luqadaha, qaybta kaalmada adeegyada, tawanda dash. So [facility] [phone].    ATENCIÓN: Si habla español, tiene a velazquez disposición servicios gratuitos de asistencia lingüística. [facility] [phone].    We comply with applicable federal civil rights laws and [place] laws. We do not discriminate on the basis of race, color, national origin, age, disability, sex, sexual orientation, or gender identity.            Thank you!     Thank you for choosing [facility]  for your care. Our goal is always to provide you with excellent care. Hearing back from our patients is one way we can continue to improve our services. Please take a few minutes to complete the written survey that you may receive in the mail after your visit with us. Thank you!             Your Updated Medication List - Protect others around you: Learn how to safely use, store and throw away your medicines at www.disposemymeds.org.          This list is accurate as of 3/12/18  4:47 PM.  Always use your most recent med list.                   Brand Name Dispense Instructions for use Diagnosis    aspirin-acetaminophen-caffeine 250-250-65 MG  per tablet    EXCEDRIN MIGRAINE    80 tablet    Take 1 tablet by mouth every 6 hours as needed for headaches    Migraine without aura and without status migrainosus, not intractable       ferrous sulfate 325 (65 FE) MG tablet    IRON    90 tablet    Take 1 tablet (325 mg) by mouth 3 times daily (with meals)    Iron deficiency anemia, unspecified iron deficiency anemia type       NO ACTIVE MEDICATIONS       Need for prophylactic vaccination and inoculation against influenza

## 2018-03-12 NOTE — NURSING NOTE
"Chief Complaint   Patient presents with     Headache     She has awful migraine headaches and blood in urine. Wants to discuss about the clots in her menstrual cycle.       Initial /76 (BP Location: Right arm, Patient Position: Sitting, Cuff Size: Adult Large)  Pulse 100  Temp 98.6  F (37  C) (Oral)  Ht 5' 6\" (1.676 m)  Wt 232 lb 1.6 oz (105.3 kg)  LMP 02/23/2018 (Exact Date)  SpO2 100%  BMI 37.46 kg/m2 Estimated body mass index is 37.46 kg/(m^2) as calculated from the following:    Height as of this encounter: 5' 6\" (1.676 m).    Weight as of this encounter: 232 lb 1.6 oz (105.3 kg).  Medication Reconciliation: complete   Zita Kerr MA      "

## 2018-03-12 NOTE — PROGRESS NOTES
SUBJECTIVE:   Karen Orellana is a 27 year old female who presents to clinic today for the following health issues:    Headaches  -Patient reports a protracted headache lasting >48 hours, debilitating, feeling nausea. Photophobia  -No vision changes.  No fevers. No recent uri  -No weakness or numbness  Left occipital is where its worse  This headache lasted >48 hours.  No relation to periods  She took tylenol and ibuprofen with some relief  -Some family hx of migraines  -Debilitating headache  -No other changes in stressors  -Caffeine intake:  2 cups of coffee cups. Plus green tea in the evening. No additional coffee, no energy drinks     Hematuria:  Patient reports blood in urine (in toilet bowl).   She was not on her period. This has been intermittent.    She does have history of LINDY with markedly low ferritin, but normal MCV.        Regular periods but irregular flow.     Hemoglobin A1C was 5.4%.       Problem list and histories reviewed & adjusted, as indicated.  Additional history: as documented    Patient Active Problem List   Diagnosis     Iron deficiency anemia     CARDIOVASCULAR SCREENING; LDL GOAL LESS THAN 160     Morbid obesity due to excess calories (H)     Migraine without aura and without status migrainosus, not intractable     History reviewed. No pertinent surgical history.    Social History   Substance Use Topics     Smoking status: Never Smoker     Smokeless tobacco: Never Used     Alcohol use Yes      Comment: 1-2 times per week     Family History   Problem Relation Age of Onset     DIABETES Father      DIABETES Paternal Grandmother      DIABETES Paternal Grandfather      DIABETES Maternal Uncle      DIABETES Maternal Aunt      Hypertension Maternal Grandmother      Breast Cancer Maternal Grandmother            Reviewed and updated as needed this visit by clinical staff  Tobacco  Allergies  Med Hx  Surg Hx  Fam Hx  Soc Hx      Reviewed and updated as needed this visit by  "Provider         ROS:  Constitutional, HEENT, cardiovascular, pulmonary, gi and gu systems are negative, except as otherwise noted.    OBJECTIVE:     /76 (BP Location: Right arm, Patient Position: Sitting, Cuff Size: Adult Large)  Pulse 100  Temp 98.6  F (37  C) (Oral)  Ht 5' 6\" (1.676 m)  Wt 232 lb 1.6 oz (105.3 kg)  LMP 02/23/2018 (Exact Date)  SpO2 100%  BMI 37.46 kg/m2  Body mass index is 37.46 kg/(m^2).  GENERAL: healthy, alert and no distress  NECK: no adenopathy, no asymmetry, masses, or scars and thyroid normal to palpation  RESP: lungs clear to auscultation - no rales, rhonchi or wheezes  CV: regular rate and rhythm, normal S1 S2, no S3 or S4, no murmur, click or rub, no peripheral edema and peripheral pulses strong  ABDOMEN: soft, nontender, no hepatosplenomegaly, no masses and bowel sounds normal  MS: no gross musculoskeletal defects noted, no edema  NEURO: Normal strength and tone, mentation intact and speech normal  Pelvic Exam:  Vulva: No external lesions, normal hair distribution, no adenopathy  Vagina: Moist, pink, no abnormal discharge, well rugated, no lesions  Cervix: Pap smear is taken, parous, smooth, pink, no visible lesions  Uterus: Normal size, anteverted, non-tender, mobile  Ovaries: No mass, non-tender, mobile      Diagnostic Test Results:  No results found for this or any previous visit (from the past 24 hour(s)).    ASSESSMENT/PLAN:       1. Hematuria, unspecified type    Unclear etiology.  Per UA I ordered no evidence of erythocytes.  I checked hemolysis labs and they also returned negative.  Her peripheral smear is more consistent with LINDY but patient reports no improvement with oral Fe supplementation.  Her ferritin continues to be low.  Will refer to Heme for further assessment, may be that she has some thalassemia underlying.       - Lactate Dehydrogenase  - Haptoglobin  - Reticulocyte count  - CBC with platelets differential  - Blood Morphology Pathologist Review  - " Direct antiglobulin test  - *UA reflex to Microscopic and Culture ([place] [facility] (except [place] and [place])  - ONC/HEME ADULT REFERRAL  - Urine Microscopic  - Direct antiglobulin test    2. Iron deficiency anemia due to chronic blood loss  See above  - Lactate Dehydrogenase  - Haptoglobin  - Reticulocyte count  - CBC with platelets differential  - Blood Morphology Pathologist Review  - Ferritin  - ONC/HEME ADULT REFERRAL    3. Migraine without aura and without status migrainosus, not intractable    See hpi, her history is very classic for migraines without aura.  Recommended Excedrin for her prn.  Also reduce caffeine intake.     - aspirin-acetaminophen-caffeine (EXCEDRIN MIGRAINE) 250-250-65 MG per tablet; Take 1 tablet by mouth every 6 hours as needed for headaches  Dispense: 80 tablet; Refill: 1    4. Encounter for Papanicolaou smear for cervical cancer screening  screen  - Pap imaged thin layer screen reflex to HPV if ASCUS - recommend age 25 - 29    5. Vaginal discharge    - Wet prep      Chase Villafuerte MD  [facility]

## 2018-03-13 LAB
COPATH REPORT: NORMAL
FERRITIN SERPL-MCNC: 11 NG/ML (ref 12–150)
LDH SERPL L TO P-CCNC: 149 U/L (ref 81–234)

## 2018-03-13 ASSESSMENT — PATIENT HEALTH QUESTIONNAIRE - PHQ9: SUM OF ALL RESPONSES TO PHQ QUESTIONS 1-9: 9

## 2018-03-14 LAB — HAPTOGLOB SERPL-MCNC: 192 MG/DL (ref 15–200)

## 2018-03-15 LAB
COPATH REPORT: NORMAL
PAP: NORMAL

## 2018-03-20 LAB
DIFFERENTIAL METHOD BLD: ABNORMAL
EOSINOPHIL # BLD AUTO: 0.2 10E9/L (ref 0–0.7)
EOSINOPHIL NFR BLD AUTO: 2 %
ERYTHROCYTE [DISTWIDTH] IN BLOOD BY AUTOMATED COUNT: 16 % (ref 10–15)
HCT VFR BLD AUTO: 32.5 % (ref 35–47)
HGB BLD-MCNC: 10.4 G/DL (ref 11.7–15.7)
LYMPHOCYTES # BLD AUTO: 2.3 10E9/L (ref 0.8–5.3)
LYMPHOCYTES NFR BLD AUTO: 21 %
MCH RBC QN AUTO: 24.9 PG (ref 26.5–33)
MCHC RBC AUTO-ENTMCNC: 32 G/DL (ref 31.5–36.5)
MCV RBC AUTO: 78 FL (ref 78–100)
MONOCYTES # BLD AUTO: 0.9 10E9/L (ref 0–1.3)
MONOCYTES NFR BLD AUTO: 8 %
NEUTROPHILS # BLD AUTO: 7.5 10E9/L (ref 1.6–8.3)
NEUTROPHILS NFR BLD AUTO: 69 %
PLATELET # BLD AUTO: 455 10E9/L (ref 150–450)
PLATELET # BLD EST: ABNORMAL 10*3/UL
RBC # BLD AUTO: 4.17 10E12/L (ref 3.8–5.2)
RBC MORPH BLD: ABNORMAL
WBC # BLD AUTO: 10.9 10E9/L (ref 4–11)

## 2018-04-06 ENCOUNTER — OFFICE VISIT (OUTPATIENT)
Dept: OBGYN | Facility: CLINIC | Age: 28
End: 2018-04-06
Payer: COMMERCIAL

## 2018-04-06 VITALS
DIASTOLIC BLOOD PRESSURE: 82 MMHG | SYSTOLIC BLOOD PRESSURE: 129 MMHG | WEIGHT: 235 LBS | BODY MASS INDEX: 37.93 KG/M2 | HEART RATE: 87 BPM

## 2018-04-06 DIAGNOSIS — N92.0 EXCESSIVE OR FREQUENT MENSTRUATION: Primary | ICD-10-CM

## 2018-04-06 DIAGNOSIS — N94.10 DYSPAREUNIA IN FEMALE: ICD-10-CM

## 2018-04-06 DIAGNOSIS — E28.2 PCOS (POLYCYSTIC OVARIAN SYNDROME): ICD-10-CM

## 2018-04-06 PROCEDURE — 99204 OFFICE O/P NEW MOD 45 MIN: CPT | Performed by: OBSTETRICS & GYNECOLOGY

## 2018-04-06 RX ORDER — DESOGESTREL AND ETHINYL ESTRADIOL 0.15-0.03
1 KIT ORAL DAILY
Qty: 84 TABLET | Refills: 3 | Status: SHIPPED | OUTPATIENT
Start: 2018-04-06 | End: 2018-05-24

## 2018-04-06 NOTE — NURSING NOTE
"Chief Complaint   Patient presents with     Abnormal Uterine Bleeding     Irregular heavy bleeding including clots x 2-3 months. C/o anxiety, headaches and nausea.        Initial /82  Pulse 87  Wt 235 lb (106.6 kg)  LMP 03/27/2018 (Exact Date)  Breastfeeding? No  BMI 37.93 kg/m2 Estimated body mass index is 37.93 kg/(m^2) as calculated from the following:    Height as of 3/12/18: 5' 6\" (1.676 m).    Weight as of this encounter: 235 lb (106.6 kg).  Medication Reconciliation: complete   Meg Spring LPN      "

## 2018-04-06 NOTE — PROGRESS NOTES
SUBJECTIVE:                                                   I was asked to see this patient today for consultation by Dr. Villafuerte for an opinion regarding abnormal uterine bleeding.    Karen Orellana is a 27 year old female  who presents to clinic today for heavy periods and iron deficiency anemia.    Menstrual cycles:    Menarche at age 11. Periods were heavy for 6 months then light.  Periods are regular q 28-30 days, lasting 2-3 days, without heavy bleeding at that time, just spotting, large clots in between bleeding, no pain. Changes a pad or tampon every 2 hours.   Dysmenorrhea none.   Cyclic symptoms include  none.   Additional symptoms include intermenstrual bleeding   Double protection: No  Birth control method: not sexually active.     STD History: No STD history  Galactorrhea: No  Hirsuitism: Yes  Intolerance to hot or cold: Yes cold  Significant change in weight within last year: Yes  History of abnormal pap: No        Problem list and histories reviewed & adjusted, as indicated.  Additional history: as documented.    Patient Active Problem List   Diagnosis     Iron deficiency anemia     CARDIOVASCULAR SCREENING; LDL GOAL LESS THAN 160     Morbid obesity due to excess calories (H)     Migraine without aura and without status migrainosus, not intractable     Past Surgical History:   Procedure Laterality Date     NO HISTORY OF SURGERY        Social History   Substance Use Topics     Smoking status: Never Smoker     Smokeless tobacco: Never Used     Alcohol use Yes      Comment: 1-2 times per week      Problem (# of Occurrences) Relation (Name,Age of Onset)    Asthma (1) Mother    Breast Cancer (1) Maternal Grandmother    DIABETES (5) Father, Paternal Grandmother, Paternal Grandfather, Maternal Uncle, Maternal Aunt    Hypertension (2) Mother, Maternal Grandmother    Hypoglycemia (1) Mother              Current Outpatient Prescriptions on File Prior to Visit:  aspirin-acetaminophen-caffeine  (EXCEDRIN MIGRAINE) 250-250-65 MG per tablet Take 1 tablet by mouth every 6 hours as needed for headaches   ferrous sulfate (IRON) 325 (65 FE) MG tablet Take 1 tablet (325 mg) by mouth 3 times daily (with meals)     No current facility-administered medications on file prior to visit.   Allergies   Allergen Reactions     No Known Drug Allergies        ROS:  General: POSITIVE for:, weight gain over last 4-6 months. Works out 5 days a week and not helping. Appetite is minimal alternating with extreme hunger, vomits 3 times a week. Anxiety contributes as well.  Resp: No coughing, wheezing or shortness of breath  CV: No chest pains or palpitations  GI: POSITIVE for:, nausea, vomiting, diarrhea on occasion more recently.  : No urinary frequency or dysuria, bladder or kidney problems  Neurologic: POSITIVE for:, Hx migraines  Psychiatric:  anxiety  Heme/immune/allergy: No history of bleeding or clotting problems or anemia.  No allergies or immune system problems  Endocrine: No history of thyroid disease, diabetes or other endocrine disorders  Skin: No rashes,worrisome lesions or skin problems      OBJECTIVE:     Exam:  /82  Pulse 87  Wt 235 lb (106.6 kg)  LMP 03/27/2018 (Exact Date)  Breastfeeding? No  BMI 37.93 kg/m2      Constitutional:  Appearance: Well nourished, well developed alert, in no acute distress  Chest:  Respiratory Effort:  Breathing unlabored  Cardiovascular: no edema.  Skin:General Inspection:  No rashes present, no lesions present, no areas of discoloration. Hirsutism evident on the jawline and chin.  Neurologic/Psychiatric:  Mental Status:  Oriented X3       In-Clinic Test Results:  No results found for this or any previous visit (from the past 24 hour(s)).    ASSESSMENT/PLAN:                                                        ICD-10-CM    1. Excessive or frequent menstruation N92.0 US Pelvic Complete with Transvaginal     desogestrel-ethinyl estradiol (APRI) 0.15-30 MG-MCG per tablet    2. PCOS (polycystic ovarian syndrome) E28.2 17 OH progesterone     DHEA sulfate     Estradiol     Insulin level     Glucose     Prolactin     TSH with free T4 reflex     desogestrel-ethinyl estradiol (APRI) 0.15-30 MG-MCG per tablet   3. Dyspareunia in female N94.10          Patient counselled on etiologies of abnormal bleeding, evaluation for etiology, options for treatment including hormonal management and progestin IUD. She is interested in oral contraceptive pills for birth control, and we had a long discussion regarding their use.    I think there is a good chance that she has PCOS. Discussed polycystic ovarian syndrome. Explained that polycystic ovary syndrome (PCOS) is an important cause of both menstrual irregularity and androgen excess in women. Discussed that it can cause irregular menstrual cycles, hirsutism, obesity, insulin resistance, and anovulatory infertility. Specifically explained that due to PCOS she may be at increased risk of type 2 diabetes, cardiovascular disease, endometrial hyperplasia, endometrial cancer, and infertility.   Counseled that diet and exercise for weight reduction are the first steps for overweight and obese women with PCOS. Weight loss can restore ovulatory cycles and improve metabolic risk. Also discussed that a lower carb diet is often beneficial. I recommended that she consider a modified and less strict version of Whole30 or Paleo, or a diabetic diet, with an emphasis on whole, unprocessed foods and high-quality carbohydrate sources, like vegetables, fruits, and complex, unprocessed grains. We will discuss this further at her follow up appointment, and also may consider referral to endocrine for weight management help.  Discussed that oral contraceptives (OCs) are the mainstay of pharmacologic therapy for women with PCOS for managing hyperandrogenism, treating menstrual dysfunction and protecting endometrial lining. Additionally, these provide contraception. As above,  she is interested in initiating oral contraceptive pills. Prescription given.  For women with PCOS who choose not to or cannot take OCs, we discussed alternative treatments for endometrial protection including intermittent or continuous progestin therapy, or a progestin-releasing intrauterine device (IUD). Depo provera, Nuvaring, the patch, and Nexplanon may also provide this protection in addition to contraception. Depo and Nexplanon would not be my first choices for her, however, given her obesity and hirsutism.  Additionally discussed metformin as an alternative that can restore ovulatory menses in some women with PCOS, and that this can be particularly beneficial in women with documented hyperinsulinemia.  Plan for now is labs (future orders placed) and pelvic ultrasound, then return appointment to see me for exam.  She also expressed that she has always had pain with every episode of intercourse since initiating intercourse at age 19. This has never gotten better. This is insertional and deep pain. 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 Discussed that pelvic pain of a chronic nature frequently is multifactorial in origin, and therefore often requires a multidisciplinary approach for optimal treatment. This may include in her case medication (hormonal and otherwise), physical therapy, pain clinic, and psychologic treatment. Because this develops over a period of time, there will not be an immediate recovery with any of these options over the short term. The goal is always to maximize function and minimize symptoms, to the extent that is possible. Consider referral to  physical therapy if PFTM is suspected based on her follow up exam.      Prescriptions provided: oral contraceptive pills     Today I spent 45 minutes with the patient, of which 40 minutes was spent reviewing PCOS,  dyspareunia, diet, history, and pathophysiology of the disease process.    Jen Henry MD  [facility]

## 2018-04-06 NOTE — MR AVS SNAPSHOT
After Visit Summary   4/6/2018    Karen Orellana    MRN: [id number]           Patient Information     Date Of Birth          1990        Visit Information        Provider Department      4/6/2018 3:00 PM Jen Henry MD [facility]        Today's Diagnoses     Excessive or frequent menstruation    -  1    PCOS (polycystic ovarian syndrome)          Care Instructions    Call [phone] to schedule your ultrasound.    Make an appointment to follow up with me for exam and test results in 4-6 weeks.              Follow-ups after your visit        Future tests that were ordered for you today     Open Future Orders        Priority Expected Expires Ordered    DHEA sulfate Routine  4/6/2019 4/6/2018    Estradiol Routine  10/5/2018 4/6/2018    Insulin level Routine  7/15/2018 4/6/2018    Glucose Routine  10/5/2018 4/6/2018    Prolactin Routine  10/5/2018 4/6/2018    TSH with free T4 reflex Routine  10/5/2018 4/6/2018    17 OH progesterone Routine  4/6/2019 4/6/2018    US Pelvic Complete with Transvaginal Routine  4/6/2019 4/6/2018            Who to contact     If you have questions or need follow up information about today's clinic visit or your schedule please contact [facility] directly at [phone].  Normal or non-critical lab and imaging results will be communicated to you by MyChart, letter or phone within 4 business days after the clinic has received the results. If you do not hear from us within 7 days, please contact the clinic through MyChart or phone. If you have a critical or abnormal lab result, we will notify you by phone as soon as possible.  Submit refill requests through [employer] or call your pharmacy and they will forward the refill request to us. Please allow 3 business days for your refill to be completed.          Additional Information About Your Visit        Health [employer] Information     [employer] gives you secure access to your  electronic health record. If you see a primary care provider, you can also send messages to your care team and make appointments. If you have questions, please call your primary care clinic.  If you do not have a primary care provider, please call [phone] and they will assist you.        Care EveryWhere ID     This is your Care EveryWhere ID. This could be used by other organizations to access your [facility] medical records  [id number]        Your Vitals Were     Pulse Last Period Breastfeeding? BMI (Body Mass Index)          87 03/27/2018 (Exact Date) No 37.93 kg/m2         Blood Pressure from Last 3 Encounters:   04/06/18 129/82   03/12/18 112/76   12/26/17 120/82    Weight from Last 3 Encounters:   04/06/18 235 lb (106.6 kg)   03/12/18 232 lb 1.6 oz (105.3 kg)   12/26/17 229 lb 11.2 oz (104.2 kg)                 Today's Medication Changes          These changes are accurate as of 4/6/18  4:08 PM.  If you have any questions, ask your nurse or doctor.               Start taking these medicines.        Dose/Directions    desogestrel-ethinyl estradiol 0.15-30 MG-MCG per tablet   Commonly known as:  APRI   Used for:  Excessive or frequent menstruation, PCOS (polycystic ovarian syndrome)   Started by:  Jen Henry MD        Dose:  1 tablet   Take 1 tablet by mouth daily   Quantity:  84 tablet   Refills:  3            Where to get your medicines      These medications were sent to [id number] - [facility] [address]  [address] [facility] [address]    Hours:  9Am-9Pm (M-F) 9Am-6Pm (S&S) Phone:  [phone]     desogestrel-ethinyl estradiol 0.15-30 MG-MCG per tablet                Primary Care Provider Office Phone # Fax #    Chase Villafuerte [phone]       [address]  [facility] [address]        Equal Access to Services     YVETTE CLEARY AH: Jose Juan Hoffmann, candida mckeon, tawanda schustern ah.  [facility] [phone].    ATENCIÓN: Si frank meng, tiene a velazquez disposición servicios gratuitos de asistencia lingüística. Jonn silveira [phone].    We comply with applicable federal civil rights laws and [place] laws. We do not discriminate on the basis of race, color, national origin, age, disability, sex, sexual orientation, or gender identity.            Thank you!     Thank you for choosing [facility]  for your care. Our goal is always to provide you with excellent care. Hearing back from our patients is one way we can continue to improve our services. Please take a few minutes to complete the written survey that you may receive in the mail after your visit with us. Thank you!             Your Updated Medication List - Protect others around you: Learn how to safely use, store and throw away your medicines at www.disposemymeds.org.          This list is accurate as of 4/6/18  4:08 PM.  Always use your most recent med list.                   Brand Name Dispense Instructions for use Diagnosis    aspirin-acetaminophen-caffeine 250-250-65 MG per tablet    EXCEDRIN MIGRAINE    80 tablet    Take 1 tablet by mouth every 6 hours as needed for headaches    Migraine without aura and without status migrainosus, not intractable       desogestrel-ethinyl estradiol 0.15-30 MG-MCG per tablet    APRI    84 tablet    Take 1 tablet by mouth daily    Excessive or frequent menstruation, PCOS (polycystic ovarian syndrome)       ferrous sulfate 325 (65 FE) MG tablet    IRON    90 tablet    Take 1 tablet (325 mg) by mouth 3 times daily (with meals)    Iron deficiency anemia, unspecified iron deficiency anemia type

## 2018-04-06 NOTE — PATIENT INSTRUCTIONS
Call [phone] to schedule your ultrasound.    Make an appointment to follow up with me for exam and test results in 4-6 weeks.

## 2018-04-10 DIAGNOSIS — D50.9 IRON DEFICIENCY ANEMIA, UNSPECIFIED IRON DEFICIENCY ANEMIA TYPE: ICD-10-CM

## 2018-04-10 DIAGNOSIS — E28.2 PCOS (POLYCYSTIC OVARIAN SYNDROME): ICD-10-CM

## 2018-04-10 LAB
BASOPHILS # BLD AUTO: 0.1 10E9/L (ref 0–0.2)
BASOPHILS NFR BLD AUTO: 0.4 %
DIFFERENTIAL METHOD BLD: ABNORMAL
EOSINOPHIL # BLD AUTO: 0.2 10E9/L (ref 0–0.7)
EOSINOPHIL NFR BLD AUTO: 1.8 %
ERYTHROCYTE [DISTWIDTH] IN BLOOD BY AUTOMATED COUNT: 15.8 % (ref 10–15)
HCT VFR BLD AUTO: 33.1 % (ref 35–47)
HGB BLD-MCNC: 10.7 G/DL (ref 11.7–15.7)
LYMPHOCYTES # BLD AUTO: 3.8 10E9/L (ref 0.8–5.3)
LYMPHOCYTES NFR BLD AUTO: 28.6 %
MCH RBC QN AUTO: 25.5 PG (ref 26.5–33)
MCHC RBC AUTO-ENTMCNC: 32.3 G/DL (ref 31.5–36.5)
MCV RBC AUTO: 79 FL (ref 78–100)
MONOCYTES # BLD AUTO: 0.9 10E9/L (ref 0–1.3)
MONOCYTES NFR BLD AUTO: 6.9 %
NEUTROPHILS # BLD AUTO: 8.2 10E9/L (ref 1.6–8.3)
NEUTROPHILS NFR BLD AUTO: 62.3 %
PLATELET # BLD AUTO: 449 10E9/L (ref 150–450)
RBC # BLD AUTO: 4.19 10E12/L (ref 3.8–5.2)
WBC # BLD AUTO: 13.1 10E9/L (ref 4–11)

## 2018-04-10 PROCEDURE — 83550 IRON BINDING TEST: CPT | Performed by: OBSTETRICS & GYNECOLOGY

## 2018-04-10 PROCEDURE — 82627 DEHYDROEPIANDROSTERONE: CPT | Performed by: OBSTETRICS & GYNECOLOGY

## 2018-04-10 PROCEDURE — 83525 ASSAY OF INSULIN: CPT | Performed by: OBSTETRICS & GYNECOLOGY

## 2018-04-10 PROCEDURE — 83540 ASSAY OF IRON: CPT | Performed by: OBSTETRICS & GYNECOLOGY

## 2018-04-10 PROCEDURE — 82670 ASSAY OF TOTAL ESTRADIOL: CPT | Performed by: OBSTETRICS & GYNECOLOGY

## 2018-04-10 PROCEDURE — 85025 COMPLETE CBC W/AUTO DIFF WBC: CPT | Performed by: OBSTETRICS & GYNECOLOGY

## 2018-04-10 PROCEDURE — 82947 ASSAY GLUCOSE BLOOD QUANT: CPT | Performed by: OBSTETRICS & GYNECOLOGY

## 2018-04-10 PROCEDURE — 84146 ASSAY OF PROLACTIN: CPT | Performed by: OBSTETRICS & GYNECOLOGY

## 2018-04-10 PROCEDURE — 82728 ASSAY OF FERRITIN: CPT | Performed by: OBSTETRICS & GYNECOLOGY

## 2018-04-10 PROCEDURE — 36415 COLL VENOUS BLD VENIPUNCTURE: CPT | Performed by: OBSTETRICS & GYNECOLOGY

## 2018-04-10 PROCEDURE — 83498 ASY HYDROXYPROGESTERONE 17-D: CPT | Performed by: OBSTETRICS & GYNECOLOGY

## 2018-04-10 PROCEDURE — 84443 ASSAY THYROID STIM HORMONE: CPT | Performed by: OBSTETRICS & GYNECOLOGY

## 2018-04-11 LAB
ESTRADIOL SERPL-MCNC: 97 PG/ML
FERRITIN SERPL-MCNC: 12 NG/ML (ref 12–150)
GLUCOSE SERPL-MCNC: 82 MG/DL (ref 70–99)
INSULIN SERPL-ACNC: 18.2 MU/L (ref 3–25)
IRON SATN MFR SERPL: 5 % (ref 15–46)
IRON SERPL-MCNC: 23 UG/DL (ref 35–180)
PROLACTIN SERPL-MCNC: 16 UG/L (ref 3–27)
TIBC SERPL-MCNC: 464 UG/DL (ref 240–430)
TSH SERPL DL<=0.005 MIU/L-ACNC: 2.33 MU/L (ref 0.4–4)

## 2018-04-12 LAB — DHEA-S SERPL-MCNC: 267 UG/DL (ref 35–430)

## 2018-04-16 LAB — 17OHP SERPL-MCNC: 46 NG/DL

## 2018-04-27 ENCOUNTER — RADIANT APPOINTMENT (OUTPATIENT)
Dept: ULTRASOUND IMAGING | Facility: CLINIC | Age: 28
End: 2018-04-27
Attending: OBSTETRICS & GYNECOLOGY
Payer: COMMERCIAL

## 2018-04-27 DIAGNOSIS — N92.0 EXCESSIVE OR FREQUENT MENSTRUATION: ICD-10-CM

## 2018-04-27 PROCEDURE — 76856 US EXAM PELVIC COMPLETE: CPT | Performed by: OBSTETRICS & GYNECOLOGY

## 2018-04-27 PROCEDURE — 76830 TRANSVAGINAL US NON-OB: CPT | Performed by: OBSTETRICS & GYNECOLOGY

## 2018-04-30 ENCOUNTER — ONCOLOGY VISIT (OUTPATIENT)
Dept: ONCOLOGY | Facility: CLINIC | Age: 28
End: 2018-04-30
Attending: INTERNAL MEDICINE
Payer: COMMERCIAL

## 2018-04-30 ENCOUNTER — HOSPITAL ENCOUNTER (OUTPATIENT)
Facility: CLINIC | Age: 28
Setting detail: SPECIMEN
Discharge: HOME | End: 2018-04-30
Attending: INTERNAL MEDICINE | Admitting: INTERNAL MEDICINE
Payer: COMMERCIAL

## 2018-04-30 VITALS
WEIGHT: 228 LBS | TEMPERATURE: 98 F | BODY MASS INDEX: 36.64 KG/M2 | DIASTOLIC BLOOD PRESSURE: 78 MMHG | OXYGEN SATURATION: 100 % | SYSTOLIC BLOOD PRESSURE: 117 MMHG | RESPIRATION RATE: 16 BRPM | HEART RATE: 66 BPM | HEIGHT: 66 IN

## 2018-04-30 DIAGNOSIS — D50.0 IRON DEFICIENCY ANEMIA DUE TO CHRONIC BLOOD LOSS: Primary | ICD-10-CM

## 2018-04-30 LAB
ALBUMIN SERPL-MCNC: 3.6 G/DL (ref 3.4–5)
ALP SERPL-CCNC: 67 U/L (ref 40–150)
ALT SERPL W P-5'-P-CCNC: 23 U/L (ref 0–50)
ANION GAP SERPL CALCULATED.3IONS-SCNC: 7 MMOL/L (ref 3–14)
AST SERPL W P-5'-P-CCNC: 16 U/L (ref 0–45)
BASOPHILS # BLD AUTO: 0.1 10E9/L (ref 0–0.2)
BASOPHILS NFR BLD AUTO: 0.6 %
BILIRUB SERPL-MCNC: 0.1 MG/DL (ref 0.2–1.3)
BUN SERPL-MCNC: 8 MG/DL (ref 7–30)
CALCIUM SERPL-MCNC: 8.7 MG/DL (ref 8.5–10.1)
CHLORIDE SERPL-SCNC: 106 MMOL/L (ref 94–109)
CO2 SERPL-SCNC: 24 MMOL/L (ref 20–32)
CREAT SERPL-MCNC: 0.68 MG/DL (ref 0.52–1.04)
DIFFERENTIAL METHOD BLD: ABNORMAL
EOSINOPHIL # BLD AUTO: 0.3 10E9/L (ref 0–0.7)
EOSINOPHIL NFR BLD AUTO: 2.7 %
ERYTHROCYTE [DISTWIDTH] IN BLOOD BY AUTOMATED COUNT: 15.6 % (ref 10–15)
FERRITIN SERPL-MCNC: 6 NG/ML (ref 12–150)
FOLATE SERPL-MCNC: 19.1 NG/ML
GFR SERPL CREATININE-BSD FRML MDRD: >90 ML/MIN/1.7M2
GLUCOSE SERPL-MCNC: 79 MG/DL (ref 70–99)
HCT VFR BLD AUTO: 33.6 % (ref 35–47)
HGB BLD-MCNC: 10.6 G/DL (ref 11.7–15.7)
IMM GRANULOCYTES # BLD: 0 10E9/L (ref 0–0.4)
IMM GRANULOCYTES NFR BLD: 0.3 %
IRON SATN MFR SERPL: 4 % (ref 15–46)
IRON SERPL-MCNC: 17 UG/DL (ref 35–180)
LYMPHOCYTES # BLD AUTO: 4 10E9/L (ref 0.8–5.3)
LYMPHOCYTES NFR BLD AUTO: 41.8 %
MCH RBC QN AUTO: 25.2 PG (ref 26.5–33)
MCHC RBC AUTO-ENTMCNC: 31.5 G/DL (ref 31.5–36.5)
MCV RBC AUTO: 80 FL (ref 78–100)
MONOCYTES # BLD AUTO: 0.7 10E9/L (ref 0–1.3)
MONOCYTES NFR BLD AUTO: 7.4 %
NEUTROPHILS # BLD AUTO: 4.5 10E9/L (ref 1.6–8.3)
NEUTROPHILS NFR BLD AUTO: 47.2 %
NRBC # BLD AUTO: 0 10*3/UL
NRBC BLD AUTO-RTO: 0 /100
PLATELET # BLD AUTO: 499 10E9/L (ref 150–450)
POTASSIUM SERPL-SCNC: 3.9 MMOL/L (ref 3.4–5.3)
PROT SERPL-MCNC: 9.1 G/DL (ref 6.8–8.8)
RBC # BLD AUTO: 4.2 10E12/L (ref 3.8–5.2)
RETICS # AUTO: 63.8 10E9/L (ref 25–95)
RETICS/RBC NFR AUTO: 1.5 % (ref 0.5–2)
SODIUM SERPL-SCNC: 137 MMOL/L (ref 133–144)
TIBC SERPL-MCNC: 467 UG/DL (ref 240–430)
TRANSFERRIN SERPL-MCNC: 397 MG/DL (ref 210–360)
VIT B12 SERPL-MCNC: 297 PG/ML (ref 193–986)
WBC # BLD AUTO: 9.5 10E9/L (ref 4–11)

## 2018-04-30 PROCEDURE — 85025 COMPLETE CBC W/AUTO DIFF WBC: CPT | Performed by: INTERNAL MEDICINE

## 2018-04-30 PROCEDURE — 82668 ASSAY OF ERYTHROPOIETIN: CPT | Performed by: INTERNAL MEDICINE

## 2018-04-30 PROCEDURE — 82728 ASSAY OF FERRITIN: CPT | Performed by: INTERNAL MEDICINE

## 2018-04-30 PROCEDURE — 83550 IRON BINDING TEST: CPT | Performed by: INTERNAL MEDICINE

## 2018-04-30 PROCEDURE — 82607 VITAMIN B-12: CPT | Performed by: INTERNAL MEDICINE

## 2018-04-30 PROCEDURE — 99204 OFFICE O/P NEW MOD 45 MIN: CPT | Performed by: INTERNAL MEDICINE

## 2018-04-30 PROCEDURE — 83540 ASSAY OF IRON: CPT | Performed by: INTERNAL MEDICINE

## 2018-04-30 PROCEDURE — 80053 COMPREHEN METABOLIC PANEL: CPT | Performed by: INTERNAL MEDICINE

## 2018-04-30 PROCEDURE — 84466 ASSAY OF TRANSFERRIN: CPT | Performed by: INTERNAL MEDICINE

## 2018-04-30 PROCEDURE — 85045 AUTOMATED RETICULOCYTE COUNT: CPT | Performed by: INTERNAL MEDICINE

## 2018-04-30 PROCEDURE — 82746 ASSAY OF FOLIC ACID SERUM: CPT | Performed by: INTERNAL MEDICINE

## 2018-04-30 PROCEDURE — G0463 HOSPITAL OUTPT CLINIC VISIT: HCPCS

## 2018-04-30 ASSESSMENT — PAIN SCALES - GENERAL: PAINLEVEL: NO PAIN (0)

## 2018-04-30 NOTE — PATIENT INSTRUCTIONS
1- Labs today - Drawn BC  2- Injectafer IV X 2 doses- Arlyn OK'ed 2pm tomorrow -Pt requested 5/4/18. Scheduled. Skyla ROMEO  3- RTC MD 6-8 weeks with labs before- CBC diff, retic, iron studies-Scheduled for 6/25/18. Skyla ROMEO  AVS printed and given to Pt. Skyla Avalos, RN, BSN, OCN  [facility]  Patient Care Coordinator

## 2018-04-30 NOTE — NURSING NOTE
"Oncology Rooming Note    April 30, 2018 3:36 PM   Karen Orellana is a 27 year old female who presents for:    Chief Complaint   Patient presents with     Oncology Clinic Visit     New Patient - Consult      Initial Vitals: /78  Pulse 66  Temp 98  F (36.7  C) (Tympanic)  Resp 16  Ht 1.676 m (5' 6\")  Wt 103.4 kg (228 lb)  SpO2 100%  BMI 36.8 kg/m2 Estimated body mass index is 36.8 kg/(m^2) as calculated from the following:    Height as of this encounter: 1.676 m (5' 6\").    Weight as of this encounter: 103.4 kg (228 lb). Body surface area is 2.19 meters squared.  No Pain (0) Comment: Data Unavailable   No LMP recorded. Patient is not currently having periods (Reason: Irregular Periods).  Allergies reviewed: Yes  Medications reviewed: Yes    Medications: Medication refills not needed today.  Pharmacy name entered into [facility]:    [id number] - [facility] [address] [facility] [address] [facility] [address] AT [place] [address]    Clinical concerns: New Patient Consult     8 minutes for nursing intake (face to face time)     Bárbara Atkinson CMA     DISCHARGE PLAN:  Next appointments: See patient instruction section  Departure Mode: Ambulatory  Accompanied by: self  0 minutes for nursing discharge (face to face time)   Bárbara Atkinson CMA                  "

## 2018-04-30 NOTE — MR AVS SNAPSHOT
After Visit Summary   4/30/2018    Karen Orellana    MRN: [id number]           Patient Information     Date Of Birth          1990        Visit Information        Provider Department      4/30/2018 3:30 PM Michelle Maciel MD [facility] On[facility]      Today's Diagnoses     Iron deficiency anemia due to chronic blood loss    -  1      Care Instructions      1- Labs today - Drawn BC  2- Injectafer IV X 2 doses- Arlyn OK'ed 2pm tomorrow   3- RTC MD 6-8 weeks with labs before- CBC diff, retic, iron studies          Follow-ups after your visit        Your next 10 appointments already scheduled     May 04, 2018  1:00 PM CDT   LAB with RH LAB DRAW 1   [facility] ([facility])    [facility]  [address]  [facility] [address]   [phone]           Please do not eat 10-12 hours before your appointment if you are coming in fasting for labs on lipids, cholesterol, or glucose (sugar). This does not apply to pregnant women. Water, hot tea and black coffee (with nothing added) are okay. Do not drink other fluids, diet soda or chew gum.            Jun 25, 2018  3:00 PM CDT   Return Visit with Michelle Maciel MD   [facility] ([facility])    [facility]  [address]  [facility] [address]   [phone]              Future tests that were ordered for you today     Open Future Orders        Priority Expected Expires Ordered    Iron and iron binding capacity Routine 6/30/2018 1/30/2019 4/30/2018    Transferrin Routine 6/30/2018 1/30/2019 4/30/2018    Ferritin Routine 6/30/2018 1/30/2019 4/30/2018    Reticulocyte count Routine 6/30/2018 1/30/2019 4/30/2018    CBC with platelets differential Routine 6/30/2018 1/30/2019 4/30/2018            Who to contact     If you have questions or need follow up information about today's  "clinic visit or your schedule please contact [facility] directly at [phone].  Normal or non-critical lab and imaging results will be communicated to you by MyChart, letter or phone within 4 business days after the clinic has received the results. If you do not hear from us within 7 days, please contact the clinic through [employer] or phone. If you have a critical or abnormal lab result, we will notify you by phone as soon as possible.  Submit refill requests through [employer] or call your pharmacy and they will forward the refill request to us. Please allow 3 business days for your refill to be completed.          Additional Information About Your Visit        [employer] Information     [employer] gives you secure access to your electronic health record. If you see a primary care provider, you can also send messages to your care team and make appointments. If you have questions, please call your primary care clinic.  If you do not have a primary care provider, please call [phone] and they will assist you.        Care EveryWhere ID     This is your Care EveryWhere ID. This could be used by other organizations to access your [place] medical records  [id number]        Your Vitals Were     Pulse Temperature Respirations Height Pulse Oximetry BMI (Body Mass Index)    66 98  F (36.7  C) (Tympanic) 16 1.676 m (5' 6\") 100% 36.8 kg/m2       Blood Pressure from Last 3 Encounters:   04/30/18 117/78   04/06/18 129/82   03/12/18 112/76    Weight from Last 3 Encounters:   04/30/18 103.4 kg (228 lb)   04/06/18 106.6 kg (235 lb)   03/12/18 105.3 kg (232 lb 1.6 oz)              We Performed the Following     CBC with platelets differential     Comprehensive metabolic panel     Erythropoietin     Ferritin     Folate     Iron and iron binding capacity     Reticulocyte count     Transferrin     Vitamin B12        Primary Care Provider Office Phone # Fax #    Chase Villafuerte [phone]       " [address]  [facility] [address]        Equal Access to Services     YVETTE CLEARY : Hadii aad ku hadlesapauline Somoni, waashwinda linda, qakingta curtisshadisilvia goodman, waxay idiin hayjenifermini valentinkimbermerly dash. So [facility] [phone].    ATENCIÓN: Si habla español, tiene a velazquez disposición servicios gratuitos de asistencia lingüística. Llame al [phone].    We comply with applicable federal civil rights laws and [place] laws. We do not discriminate on the basis of race, color, national origin, age, disability, sex, sexual orientation, or gender identity.            Thank you!     Thank you for choosing [facility]  for your care. Our goal is always to provide you with excellent care. Hearing back from our patients is one way we can continue to improve our services. Please take a few minutes to complete the written survey that you may receive in the mail after your visit with us. Thank you!             Your Updated Medication List - Protect others around you: Learn how to safely use, store and throw away your medicines at www.disposemymeds.org.          This list is accurate as of 4/30/18  4:09 PM.  Always use your most recent med list.                   Brand Name Dispense Instructions for use Diagnosis    aspirin-acetaminophen-caffeine 250-250-65 MG per tablet    EXCEDRIN MIGRAINE    80 tablet    Take 1 tablet by mouth every 6 hours as needed for headaches    Migraine without aura and without status migrainosus, not intractable       desogestrel-ethinyl estradiol 0.15-30 MG-MCG per tablet    APRI    84 tablet    Take 1 tablet by mouth daily    Excessive or frequent menstruation, PCOS (polycystic ovarian syndrome)       ferrous sulfate 325 (65 Fe) MG tablet    IRON    90 tablet    Take 1 tablet (325 mg) by mouth 3 times daily (with meals)    Iron deficiency anemia, unspecified iron deficiency anemia type

## 2018-04-30 NOTE — LETTER
4/30/2018         RE: Karen Orellana  [address]  [facility] [address]        Dear Colleague,    Thank you for referring your patient, Karen Orellana, to the [facility]. Please see a copy of my visit note below.    This clinic visit note has been dictated [id number]              [facility] PH[facility]ANS  HEMATOLOGY ONCOLOGY    Visit Date:   04/30/2018      REASON FOR CONSULTATION:  Severe iron deficiency anemia.      REFERRING PROVIDER:  Chase Villafuerte MD      HISTORY OF PRESENT ILLNESS:  The patient is a 27-year-old lady who states that she has a history of passing clots since last 1 year.  Since last 3 months, she has irregular uterine bleeding.  For that, she has been seeing her gynecologist.  She states that she has long-term iron deficiency and has been on oral iron since last 3 years.  Lately her gynecologist has been thinking that she might have polycystic ovarian disease.  She states that she has significant lightheadedness and dizziness with iron deficiency and anemia.  Labs were reviewed which indicated a ferritin of 12 with iron saturation of 5.  Her last hemoglobin was 10.7.  She also has leukocytosis and thrombocytosis.      PAST MEDICAL HISTORY:  Iron deficiency, migraines.      MEDICATIONS:  Reviewed.      ALLERGIES:  REVIEWED.      SOCIAL HISTORY:  She has an office job in a bank, nonsmoker.  She drinks 1 alcoholic drink a week.  She is single.      FAMILY HISTORY:  Grandmother had breast cancer in her 80s.  Aunt has breast cancer, unknown age.  Father had diabetes.      REVIEW OF SYSTEMS:  A complete review of systems was performed and found to be negative other than pertinent positives mentioned in history of present illness.      PHYSICAL EXAMINATION:  Blood pressure is 117/78, pulse 66, respirations 16, temperature 98.   CONSTITUTIONAL: Sitting comfortably.   HEENT: Pupils are equal. Oropharynx is clear.   NECK: No cervical or  supraclavicular lymphadenopathy.   RESPIRATORY: Clear bilaterally.   CARD/VASC: S1, S2, regular.   GI: Soft, nontender, nondistended, no hepatosplenomegaly.   MUSKULOSKELETAL: Warm, well perfused.   NEUROLOGIC: Alert, awake.   INTEGUMENT: No rash.   LYMPHATICS: No edema.   PSYCH: She was quite anxious.      LABORATORY DATA AND IMAGING REVIEWED DURING THIS VISIT:  Recent Labs   Lab Test  04/30/18   1558  04/10/18   1555  08/06/16   1101   NA  137   --   136   POTASSIUM  3.9   --   3.7   CHLORIDE  106   --   104   CO2  24   --   23   ANIONGAP  7   --   9   BUN  8   --   10   CR  0.68   --   0.64   GLC  79  82  84   LIBRA  8.7   --   8.6     Recent Labs   Lab Test  04/30/18   1558  04/10/18   1555  03/12/18   1653   WBC  9.5  13.1*  10.9   HGB  10.6*  10.7*  10.4*   PLT  499*  449  455*   MCV  80  79  78   NEUTROPHIL  47.2  62.3  69.0     Recent Labs   Lab Test  04/30/18   1558  03/12/18   1653  08/06/16   1101  07/23/14   0858   BILITOTAL  0.1*   --   0.5  0.2   ALKPHOS  67   --   87  77   ALT  23   --   26  22   AST  16   --   14  20   ALBUMIN  3.6   --   3.7  3.9   LDH   --   149   --    --      ASSESSMENT AND RECOMMENDATIONS:  This is a 27-year-old lady with significant iron deficiency and normocytic anemia.  She has been on long-term oral iron supplementation, but it appears that her amount of uterine blood loss is quite heavy and she continues to be persistently iron deficient despite being on oral iron.  She also has leukocytosis and elevated platelet count.  Elevated platelet count is likely related to iron deficiency.  Leukocytosis is unclear in etiology.  She had normal differential 3 weeks ago.      I discussed with the patient about the option of using IV iron.  Discussed risks and benefits and also discussed the small risk of severe allergic reaction like anaphylaxis.  She asked appropriate questions and is willing to proceed.      We will give her Injectafer x 2 doses.  I will also draw some labs today and  I would like to see her back in 6-8 weeks with a repeat blood count and iron studies.        I appreciate the opportunity to participate in this patient's care.      Revised WT:   2018, imtiaz MACIEL MD             D: 2018   T: 2018   MT: ISSAC      Name:     LUPILLO JARAMILLO   MRN:      [id number]        Account:      [id number]   :      1990           Visit Date:   2018      Document: [id number]       cc: Chase Villafuerte MD       Again, thank you for allowing me to participate in the care of your patient.        Sincerely,        Michelle Maciel MD

## 2018-05-01 LAB — EPO SERPL-ACNC: 15 MU/ML (ref 4–27)

## 2018-05-01 NOTE — CONSULTS
Consult Date:  04/30/2018      REASON FOR CONSULTATION:  Severe iron deficiency anemia.      REFERRING PROVIDER:  Chase Villafuerte MD      HISTORY OF PRESENT ILLNESS:  The patient is a 27-year-old lady who states that she has a history of passing clots since last 1 year.  Since last 3 months, she has irregular uterine bleeding.  For that, she has been seeing her gynecologist.  She states that she has long-term iron deficiency and has been on oral iron since last 3 years.  Lately her gynecologist has been thinking that she might have polycystic ovarian disease.  She states that she has significant lightheadedness and dizziness with iron deficiency and anemia.  Labs were reviewed which indicated a ferritin of 12 with iron saturation of 5.  Her last hemoglobin was 10.7.  She also has leukocytosis and thrombocytosis.      PAST MEDICAL HISTORY:  Iron deficiency, migraines.      MEDICATIONS:  Reviewed.      ALLERGIES:  REVIEWED.      SOCIAL HISTORY:  She has an office job in a bank, nonsmoker.  She drinks 1 alcoholic drink a week.  She is single.      FAMILY HISTORY:  Grandmother had breast cancer in her 80s.  Aunt has breast cancer, unknown age.  Father had diabetes.      REVIEW OF SYSTEMS:  A complete review of systems was performed and found to be negative other than pertinent positives mentioned in history of present illness.      PHYSICAL EXAMINATION:  Blood pressure is 117/78, pulse 66, respirations 16, temperature 98.  A complete physical examination was performed.  She was quite anxious.      LABORATORIES:  Reviewed.      ASSESSMENT AND RECOMMENDATIONS:  This is a 27-year-old lady with significant iron deficiency and normocytic anemia.  She has been on long-term oral iron supplementation, but it appears that her amount of uterine blood loss is quite heavy and she continues to be persistently iron deficient despite being on oral iron.  She also has leukocytosis and elevated platelet count.  Elevated platelet count is  likely related to iron deficiency.  Leukocytosis is unclear in etiology.  She had normal differential 3 weeks ago.      I discussed with the patient about the option of using IV iron.  Discussed risks and benefits and also discussed the small risk of severe allergic reaction like anaphylaxis.  She asked appropriate questions and is willing to proceed.      We will give her Injectafer x 2 doses.  I will also draw some labs today and I would like to see her back in 6-8 weeks with a repeat blood count and iron studies.        I appreciate the opportunity to participate in this patient's care.         OLEGARIO SHIPMAN MD             D: 2018   T: 2018   MT: ISSAC      Name:     LUPILLO JARAMILLO   MRN:      [id number]        Account:       [id number]   :      1990           Consult Date:  2018      Document: [id number]       cc: Chase Villafuerte MD

## 2018-05-01 NOTE — PROGRESS NOTES
[facility] PHYSICIANS  HEMATOLOGY ONCOLOGY    Visit Date:   04/30/2018      REASON FOR CONSULTATION:  Severe iron deficiency anemia.      REFERRING PROVIDER:  Chase Villafuerte MD      HISTORY OF PRESENT ILLNESS:  The patient is a 27-year-old lady who states that she has a history of passing clots since last 1 year.  Since last 3 months, she has irregular uterine bleeding.  For that, she has been seeing her gynecologist.  She states that she has long-term iron deficiency and has been on oral iron since last 3 years.  Lately her gynecologist has been thinking that she might have polycystic ovarian disease.  She states that she has significant lightheadedness and dizziness with iron deficiency and anemia.  Labs were reviewed which indicated a ferritin of 12 with iron saturation of 5.  Her last hemoglobin was 10.7.  She also has leukocytosis and thrombocytosis.      PAST MEDICAL HISTORY:  Iron deficiency, migraines.      MEDICATIONS:  Reviewed.      ALLERGIES:  REVIEWED.      SOCIAL HISTORY:  She has an office job in a bank, nonsmoker.  She drinks 1 alcoholic drink a week.  She is single.      FAMILY HISTORY:  Grandmother had breast cancer in her 80s.  Aunt has breast cancer, unknown age.  Father had diabetes.      REVIEW OF SYSTEMS:  A complete review of systems was performed and found to be negative other than pertinent positives mentioned in history of present illness.      PHYSICAL EXAMINATION:  Blood pressure is 117/78, pulse 66, respirations 16, temperature 98.   CONSTITUTIONAL: Sitting comfortably.   HEENT: Pupils are equal. Oropharynx is clear.   NECK: No cervical or supraclavicular lymphadenopathy.   RESPIRATORY: Clear bilaterally.   CARD/VASC: S1, S2, regular.   GI: Soft, nontender, nondistended, no hepatosplenomegaly.   MUSKULOSKELETAL: Warm, well perfused.   NEUROLOGIC: Alert, awake.   INTEGUMENT: No rash.   LYMPHATICS: No edema.   PSYCH: She was quite anxious.      LABORATORY DATA AND IMAGING  REVIEWED DURING THIS VISIT:  Recent Labs   Lab Test  04/30/18   1558  04/10/18   1555  08/06/16   1101   NA  137   --   136   POTASSIUM  3.9   --   3.7   CHLORIDE  106   --   104   CO2  24   --   23   ANIONGAP  7   --   9   BUN  8   --   10   CR  0.68   --   0.64   GLC  79  82  84   LIBRA  8.7   --   8.6     Recent Labs   Lab Test  04/30/18   1558  04/10/18   1555  03/12/18   1653   WBC  9.5  13.1*  10.9   HGB  10.6*  10.7*  10.4*   PLT  499*  449  455*   MCV  80  79  78   NEUTROPHIL  47.2  62.3  69.0     Recent Labs   Lab Test  04/30/18   1558  03/12/18   1653  08/06/16   1101  07/23/14   0858   BILITOTAL  0.1*   --   0.5  0.2   ALKPHOS  67   --   87  77   ALT  23   --   26  22   AST  16   --   14  20   ALBUMIN  3.6   --   3.7  3.9   LDH   --   149   --    --      ASSESSMENT AND RECOMMENDATIONS:  This is a 27-year-old lady with significant iron deficiency and normocytic anemia.  She has been on long-term oral iron supplementation, but it appears that her amount of uterine blood loss is quite heavy and she continues to be persistently iron deficient despite being on oral iron.  She also has leukocytosis and elevated platelet count.  Elevated platelet count is likely related to iron deficiency.  Leukocytosis is unclear in etiology.  She had normal differential 3 weeks ago.      I discussed with the patient about the option of using IV iron.  Discussed risks and benefits and also discussed the small risk of severe allergic reaction like anaphylaxis.  She asked appropriate questions and is willing to proceed.      We will give her Injectafer x 2 doses.  I will also draw some labs today and I would like to see her back in 6-8 weeks with a repeat blood count and iron studies.        I appreciate the opportunity to participate in this patient's care.      Revised WT:   05/01/2018, imtiaz SHIPMAN MD             D: 04/30/2018   T: 05/01/2018   MT: ISSAC      Name:     LUPILLO JARAMILLO   MRN:      [id number]         Account:      [id number]   :      1990           Visit Date:   2018      Document: [id number]       cc: Chase Villafuerte MD

## 2018-05-04 ENCOUNTER — INFUSION THERAPY VISIT (OUTPATIENT)
Dept: INFUSION THERAPY | Facility: CLINIC | Age: 28
End: 2018-05-04
Attending: INTERNAL MEDICINE
Payer: COMMERCIAL

## 2018-05-04 ENCOUNTER — OFFICE VISIT (OUTPATIENT)
Dept: OBGYN | Facility: CLINIC | Age: 28
End: 2018-05-04
Payer: COMMERCIAL

## 2018-05-04 VITALS
SYSTOLIC BLOOD PRESSURE: 112 MMHG | OXYGEN SATURATION: 100 % | TEMPERATURE: 99 F | DIASTOLIC BLOOD PRESSURE: 72 MMHG | HEART RATE: 74 BPM | RESPIRATION RATE: 16 BRPM

## 2018-05-04 VITALS
DIASTOLIC BLOOD PRESSURE: 85 MMHG | WEIGHT: 228 LBS | BODY MASS INDEX: 36.8 KG/M2 | SYSTOLIC BLOOD PRESSURE: 129 MMHG | HEART RATE: 86 BPM

## 2018-05-04 DIAGNOSIS — Q51.3 BICORNUATE UTERUS: ICD-10-CM

## 2018-05-04 DIAGNOSIS — N94.10 DYSPAREUNIA IN FEMALE: ICD-10-CM

## 2018-05-04 DIAGNOSIS — D50.0 IRON DEFICIENCY ANEMIA DUE TO CHRONIC BLOOD LOSS: Primary | ICD-10-CM

## 2018-05-04 DIAGNOSIS — N92.0 EXCESSIVE OR FREQUENT MENSTRUATION: ICD-10-CM

## 2018-05-04 DIAGNOSIS — E28.2 PCOS (POLYCYSTIC OVARIAN SYNDROME): Primary | ICD-10-CM

## 2018-05-04 PROCEDURE — 25000128 H RX IP 250 OP 636: Performed by: INTERNAL MEDICINE

## 2018-05-04 PROCEDURE — 96365 THER/PROPH/DIAG IV INF INIT: CPT

## 2018-05-04 PROCEDURE — 99215 OFFICE O/P EST HI 40 MIN: CPT | Performed by: OBSTETRICS & GYNECOLOGY

## 2018-05-04 PROCEDURE — 84403 ASSAY OF TOTAL TESTOSTERONE: CPT | Performed by: OBSTETRICS & GYNECOLOGY

## 2018-05-04 PROCEDURE — 36415 COLL VENOUS BLD VENIPUNCTURE: CPT | Performed by: OBSTETRICS & GYNECOLOGY

## 2018-05-04 PROCEDURE — 84270 ASSAY OF SEX HORMONE GLOBUL: CPT | Performed by: OBSTETRICS & GYNECOLOGY

## 2018-05-04 RX ADMIN — FERRIC CARBOXYMALTOSE INJECTION 750 MG: 50 INJECTION, SOLUTION INTRAVENOUS at 13:48

## 2018-05-04 ASSESSMENT — PAIN SCALES - GENERAL: PAINLEVEL: NO PAIN (0)

## 2018-05-04 NOTE — NURSING NOTE
"Chief Complaint   Patient presents with     RECHECK     Follow up to discuss US results.        Initial /85  Pulse 86  Wt 228 lb (103.4 kg)  LMP 2018  Breastfeeding? No  BMI 36.8 kg/m2 Estimated body mass index is 36.8 kg/(m^2) as calculated from the following:    Height as of 18: 5' 6\" (1.676 m).    Weight as of this encounter: 228 lb (103.4 kg).  BP completed using cuff size: regular        Meg Spring LPN               "

## 2018-05-04 NOTE — MR AVS SNAPSHOT
After Visit Summary   5/4/2018    Karen Orellana    MRN: [id number]           Patient Information     Date Of Birth          1990        Visit Information        Provider Department      5/4/2018 9:45 AM Jen Henry MD [facility]        Today's Diagnoses     PCOS (polycystic ovarian syndrome)    -  1    Dyspareunia in female        Excessive or frequent menstruation        Bicornuate uterus          Care Instructions      Polycystic Ovary Syndrome  Polycystic ovary syndrome (PCOS) causes harmless, small cysts in the ovaries and other symptoms. PCOS is caused by certain hormones being out of balance. The word  syndrome  means a group of symptoms. Women with PCOS may have no periods, irregular periods, or very long periods.    Your ovaries  Women store their eggs in their ovaries. Each egg is in a capsule called a follicle. Normally during the reproductive years, one follicle grows to produce a mature egg each month. This egg is released during ovulation and the follicle dissolves.  Hormones out of balance  With polycystic ovary syndrome (PCOS), the hormones that control ovulation are out of balance. These include estrogen, progesterone, and androgen. As a result, ovulation may not occur. Instead, the follicle stays enlarged. This is a fluid-filled sac called a cyst. Over time, the ovaries fill with many small cysts. This is why they are called  poly  or many  cystic  ovaries. In some women, the ovaries also make too much androgen.  PCOS symptoms  Women with PCOS may also have one or more of these symptoms:    Acne    Hair growth on the face and other parts of the body    Patches of thickened, velvety, darkened skin called acanthosis nigricans    Trouble getting pregnant (fertility problems)    Weight gain, especially around the waist   Women with PCOS are also at increased risk of developing cancer of the uterine lining, diabetes, and heart disease.   Date Last  Reviewed: 6/1/2017 2000-2017 [employer]. [address] [place] [address], [place] [address]. All rights reserved. This information is not intended as a substitute for professional medical care. Always follow your healthcare professional's instructions.                Follow-ups after your visit        Your next 10 appointments already scheduled     May 04, 2018  1:00 PM CDT   Level 1 with RH INFUSION CHAIR 9   [facility] ([facility])    [facility]  [address]  [facility] [address] [phone]            Jun 25, 2018  3:00 PM CDT   Return Visit with Michelle Maciel MD   [facility] ([facility])    [facility]  [address]  [facility] [address] [phone]              Who to contact     If you have questions or need follow up information about today's clinic visit or your schedule please contact [facility] directly at [phone].  Normal or non-critical lab and imaging results will be communicated to you by [employer], letter or phone within 4 business days after the clinic has received the results. If you do not hear from us within 7 days, please contact the clinic through [employer] or phone. If you have a critical or abnormal lab result, we will notify you by phone as soon as possible.  Submit refill requests through [employer] or call your pharmacy and they will forward the refill request to us. Please allow 3 business days for your refill to be completed.          Additional Information About Your Visit        [employer] Information     [employer] gives you secure access to your electronic health record. If you see a primary care provider, you can also send messages to your care team and make appointments. If you have questions, please call your primary care clinic.  If you do not have a primary care provider, please call  [phone] and they will assist you.        Care EveryWhere ID     This is your Care EveryWhere ID. This could be used by other organizations to access your [facility] medical records  [id number]        Your Vitals Were     Pulse Last Period Breastfeeding? BMI (Body Mass Index)          86 05/02/2018 No 36.8 kg/m2         Blood Pressure from Last 3 Encounters:   05/04/18 129/85   04/30/18 117/78   04/06/18 129/82    Weight from Last 3 Encounters:   05/04/18 228 lb (103.4 kg)   04/30/18 228 lb (103.4 kg)   04/06/18 235 lb (106.6 kg)              Today, you had the following     No orders found for display       Primary Care Provider Office Phone # Fax #    Chase Villafuerte [phone]       [address]  [facility] [address]        Equal Access to Services     [facility]: Hadii selwyn kwan hadasho Somoni, waaxda luqadaha, qaybta kaalmada adeoctavioyada, tawanda yuan . So [facility] [phone].    ATENCIÓN: Si habla español, tiene a velazquez disposición servicios gratuitos de asistencia lingüística. Jonn al [phone].    We comply with applicable federal civil rights laws and [place] laws. We do not discriminate on the basis of race, color, national origin, age, disability, sex, sexual orientation, or gender identity.            Thank you!     Thank you for choosing [facility]  for your care. Our goal is always to provide you with excellent care. Hearing back from our patients is one way we can continue to improve our services. Please take a few minutes to complete the written survey that you may receive in the mail after your visit with us. Thank you!             Your Updated Medication List - Protect others around you: Learn how to safely use, store and throw away your medicines at www.disposemymeds.org.          This list is accurate as of 5/4/18 10:20 AM.  Always use your most recent med list.                   Brand Name Dispense Instructions for use  Diagnosis    aspirin-acetaminophen-caffeine 250-250-65 MG per tablet    EXCEDRIN MIGRAINE    80 tablet    Take 1 tablet by mouth every 6 hours as needed for headaches    Migraine without aura and without status migrainosus, not intractable       desogestrel-ethinyl estradiol 0.15-30 MG-MCG per tablet    APRI    84 tablet    Take 1 tablet by mouth daily    Excessive or frequent menstruation, PCOS (polycystic ovarian syndrome)       ferrous sulfate 325 (65 Fe) MG tablet    IRON    90 tablet    Take 1 tablet (325 mg) by mouth 3 times daily (with meals)    Iron deficiency anemia, unspecified iron deficiency anemia type

## 2018-05-04 NOTE — PROGRESS NOTES
Infusion Nursing Note:  Karen Orellana presents today for Injectafer #1 of 2.    Patient seen by provider today: No   present during visit today: Not Applicable.    Note: Reviewed potential SE of Injectafer including allergic reaction.  Handout re: medication and SE reviewed with patient.  Patient verbalized understanding and verbally agreed to infusion.    Intravenous Access:  Peripheral IV placed.    Treatment Conditions:  Not Applicable.      Post Infusion Assessment:  Patient tolerated infusion without incident.  Patient observed for 30 minutes post Injectafer per protocol.  Blood return noted pre and post infusion.  Site patent and intact, free from redness, edema or discomfort.  No evidence of extravasations.  Access discontinued per protocol.    Discharge Plan:   Discharge instructions reviewed with: Patient.  Patient discharged in stable condition accompanied by: mother.  Departure Mode: Ambulatory.  Will schedule 2nd infusion for next wk.    ORACIO CASTLE RN

## 2018-05-04 NOTE — PATIENT INSTRUCTIONS
Polycystic Ovary Syndrome  Polycystic ovary syndrome (PCOS) causes harmless, small cysts in the ovaries and other symptoms. PCOS is caused by certain hormones being out of balance. The word  syndrome  means a group of symptoms. Women with PCOS may have no periods, irregular periods, or very long periods.    Your ovaries  Women store their eggs in their ovaries. Each egg is in a capsule called a follicle. Normally during the reproductive years, one follicle grows to produce a mature egg each month. This egg is released during ovulation and the follicle dissolves.  Hormones out of balance  With polycystic ovary syndrome (PCOS), the hormones that control ovulation are out of balance. These include estrogen, progesterone, and androgen. As a result, ovulation may not occur. Instead, the follicle stays enlarged. This is a fluid-filled sac called a cyst. Over time, the ovaries fill with many small cysts. This is why they are called  poly  or many  cystic  ovaries. In some women, the ovaries also make too much androgen.  PCOS symptoms  Women with PCOS may also have one or more of these symptoms:    Acne    Hair growth on the face and other parts of the body    Patches of thickened, velvety, darkened skin called acanthosis nigricans    Trouble getting pregnant (fertility problems)    Weight gain, especially around the waist   Women with PCOS are also at increased risk of developing cancer of the uterine lining, diabetes, and heart disease.   Date Last Reviewed: 6/1/2017 2000-2017 [employer]. [address] [place] [address], [place] [address]. All rights reserved. This information is not intended as a substitute for professional medical care. Always follow your healthcare professional's instructions.

## 2018-05-04 NOTE — MR AVS SNAPSHOT
After Visit Summary   5/4/2018    Karen Orellana    MRN: [id number]           Patient Information     Date Of Birth          1990        Visit Information        Provider Department      5/4/2018 1:00 PM RH INFUSION CHAIR 9  Infusion Services        Today's Diagnoses     Iron deficiency anemia due to chronic blood loss    -  1       Follow-ups after your visit        Your next 10 appointments already scheduled     May 24, 2018  6:00 PM CDT   New Visit with MARCELA Au CNP   [facility]    [address]  [facility] [address]   [phone]            Jun 25, 2018  3:00 PM CDT   Return Visit with Michelle Maciel MD   [facility] ([facility])    [facility]  [address]  [facility] [id number] [phone]              Future tests that were ordered for you today     Open Future Orders        Priority Expected Expires Ordered    Comprehensive metabolic panel Routine  11/2/2018 5/4/2018            Who to contact     If you have questions or need follow up information about today's clinic visit or your schedule please contact [facility] directly at [phone].  Normal or non-critical lab and imaging results will be communicated to you by August[employer], letter or phone within 4 business days after the clinic has received the results. If you do not hear from us within 7 days, please contact the clinic through [employer] or phone. If you have a critical or abnormal lab result, we will notify you by phone as soon as possible.  Submit refill requests through [employer] or call your pharmacy and they will forward the refill request to us. Please allow 3 business days for your refill to be completed.          Additional Information About Your Visit        [employer] Information     [employer] gives you secure  access to your electronic health record. If you see a primary care provider, you can also send messages to your care team and make appointments. If you have questions, please call your primary care clinic.  If you do not have a primary care provider, please call [phone] and they will assist you.        Care EveryWhere ID     This is your Care EveryWhere ID. This could be used by other organizations to access your [facility] medical records  [id number]        Your Vitals Were     Pulse Temperature Respirations Last Period Pulse Oximetry       74 99  F (37.2  C) 16 05/02/2018 100%        Blood Pressure from Last 3 Encounters:   05/04/18 112/72   05/04/18 129/85   04/30/18 117/78    Weight from Last 3 Encounters:   05/04/18 103.4 kg (228 lb)   04/30/18 103.4 kg (228 lb)   04/06/18 106.6 kg (235 lb)              Today, you had the following     No orders found for display       Primary Care Provider Office Phone # Fax #    Chase Villafuerte [phone]       [address]  [facility] [address]        Equal Access to Services     [facility] : Hadii aad ku hadasho Soomaali, waaxda luqadaha, qaybta kaalmada adejavier, tawanda yuan . So [facility] [phone].    ATENCIÓN: Si habla español, tiene a velazquez disposición servicios gratuitos de asistencia lingüística. [facility] [phone].    We comply with applicable federal civil rights laws and [place] laws. We do not discriminate on the basis of race, color, national origin, age, disability, sex, sexual orientation, or gender identity.            Thank you!     Thank you for choosing [facility]  for your care. Our goal is always to provide you with excellent care. Hearing back from our patients is one way we can continue to improve our services. Please take a few minutes to complete the written survey that you may receive in the mail after your visit with us. Thank you!             Your Updated  Medication List - Protect others around you: Learn how to safely use, store and throw away your medicines at www.disposemymeds.org.          This list is accurate as of 5/4/18  2:43 PM.  Always use your most recent med list.                   Brand Name Dispense Instructions for use Diagnosis    aspirin-acetaminophen-caffeine 250-250-65 MG per tablet    EXCEDRIN MIGRAINE    80 tablet    Take 1 tablet by mouth every 6 hours as needed for headaches    Migraine without aura and without status migrainosus, not intractable       desogestrel-ethinyl estradiol 0.15-30 MG-MCG per tablet    APRI    84 tablet    Take 1 tablet by mouth daily    Excessive or frequent menstruation, PCOS (polycystic ovarian syndrome)       ferrous sulfate 325 (65 Fe) MG tablet    IRON    90 tablet    Take 1 tablet (325 mg) by mouth 3 times daily (with meals)    Iron deficiency anemia, unspecified iron deficiency anemia type

## 2018-05-04 NOTE — PROGRESS NOTES
SUBJECTIVE:                                                   Karen Orellana is a 27 year old female who presents to clinic today to follow up for AUB (likely anovulatory), possible PCOS, and dyspareunia. Please see my last note for complete details.    She had a pelvic US which showed:  LMP: 27 Mar 2018    Hormones: OCP      Measurements:  Uterus: 7.5 x 3.6 x 7.3 cm.     Position is retroverted.  Contour is bicornuate.      Endo cav: 8.3 mm, 9.6 mm        Smooth/regular/wnl  Cervix: Wnl      Right ovary: 2.4 x 1.7 x 2.4 cm. Wnl and Mult follicles noted  Left ovary: 3.2 x 1.3 x  1.7 cm. Wnl and Mult follicles noted      Cul de sac: no free fluid      ===================================  Complete pelvic ultrasound using realtime   transabdominal and transvaginal scanning        Sonographic findings indicative of bicornuate uterus         Note: sonographic findings suggestive PCOS      Problem list and histories reviewed & adjusted, as indicated.  Additional history: as documented.    Patient Active Problem List   Diagnosis     Iron deficiency anemia     CARDIOVASCULAR SCREENING; LDL GOAL LESS THAN 160     Morbid obesity due to excess calories (H)     Migraine without aura and without status migrainosus, not intractable     Iron deficiency anemia due to chronic blood loss     Past Surgical History:   Procedure Laterality Date     NO HISTORY OF SURGERY        Social History   Substance Use Topics     Smoking status: Never Smoker     Smokeless tobacco: Never Used     Alcohol use Yes      Comment: 1-2 times per week      Problem (# of Occurrences) Relation (Name,Age of Onset)    Asthma (1) Mother    Breast Cancer (1) Maternal Grandmother    DIABETES (5) Father, Paternal Grandmother, Paternal Grandfather, Maternal Uncle, Maternal Aunt    Hypertension (2) Mother, Maternal Grandmother    Hypoglycemia (1) Mother              Current Outpatient Prescriptions on File Prior to Visit:  aspirin-acetaminophen-caffeine  (EXCEDRIN MIGRAINE) 250-250-65 MG per tablet Take 1 tablet by mouth every 6 hours as needed for headaches   desogestrel-ethinyl estradiol (APRI) 0.15-30 MG-MCG per tablet Take 1 tablet by mouth daily   ferrous sulfate (IRON) 325 (65 FE) MG tablet Take 1 tablet (325 mg) by mouth 3 times daily (with meals) (Patient not taking: Reported on 5/4/2018)     No current facility-administered medications on file prior to visit.   Allergies   Allergen Reactions     No Known Drug Allergies        ROS:  5 point ROS negative except as noted above in HPI, including Gen., Resp., CV, GI &  system review.    OBJECTIVE:     Constitutional:  Appearance: Well nourished, well developed alert, in no acute distress.  Chest:  Respiratory Effort:  Breathing unlabored  Cardiovascular: no edema.  Gastrointestinal:  Abdominal Examination:  Abdomen nontender to palpation, tone normal without rigidity or guarding, no masses present, umbilicus without lesions; Liver/Spleen:  No hepatomegaly present, liver nontender to palpation; Hernias:  No hernias present  Lymphatic: no inguinal lymphadenopathy present  Skin:General Inspection:  No rashes present, no lesions present, no areas of discoloration. Hirsutism of the abdomen in the midline, chin, cheeks/jawline.  Neurologic/Psychiatric:  Mental Status:  Oriented X3   Pelvic Exam:  External Genitalia:     Normal appearance for age, no discharge present, no tenderness present, no inflammatory lesions present, color normal. No obvious hymenal abnormalities  Bladder:     Nontender to palpation  Urethra:   Urethral Body:  Urethra palpation normal, urethra structural support normal   Urethral Meatus:  No erythema or lesions present  Cervix:     nontender to palpation. No apparent vaginal septum is noted.  VAGINA:    pelvic floor muscles bilaterally are extremely tender and there is increased tension, consistent with PFTM.  Perineum:     Perineum within normal limits, no evidence of trauma, no rashes or  skin lesions present  Anus:     Anus within normal limits, no hemorrhoids present  Inguinal Lymph Nodes:     No lymphadenopathy present  Pubic Hair:     Normal pubic hair distribution for age  Genitalia and Groin:     No rashes present, no lesions present, no areas of discoloration, no masses present        In-Clinic Test Results:  No results found for this or any previous visit (from the past 24 hour(s)).    ASSESSMENT/PLAN:                                                        ICD-10-CM    1. PCOS (polycystic ovarian syndrome) E28.2 Comprehensive metabolic panel     Testosterone Free and Total   2. Dyspareunia in female N94.10    3. Excessive or frequent menstruation N92.0    4. Bicornuate uterus Q51.3          1. Check CMP and testosterone panel today, as this was left off her last labs. Consider metformin to help with cycles and with potential weight loss.  2. Refer to endocrine (Ale Chisholm) for weight management as well.  3. Continue oral contraceptive pills for cycle control and to decrease chances of endometrial hyperplasia and cancer. This is also important as she continues to have iron deficiency anemia. Discussed that it may take 2-3 cycles for this to be at maximal effect.  4. Long discussion with patient and her mother about uterine anomalies, associated renal/urinary anomalies in some patients, effect on fertility/pregnancy. If bicornuate only, the effect should be minimal in most cases with regard to fertility, though pregnancies may need to be monitored for IUGR and  labor. If septate, there is a higher risk for miscarriage. This can be associated with vaginal septum as well, though that was not appreciated on exam today.   5. I think dyspareunia and difficulty with tampons is related to pelvic floor tension myalgia and not to a septum. I'd like her to consider pelvic physical therapy after we determine a course of action for PCOS, weight management, and uterine anomaly.    Today I spent 40  minutes with the patient, of which 35 minutes was spent reviewing PCOS, uterine anomalies, diet and exercise, risks of these conditions, and dyspareunia treatment.      Jen Henry MD  [facility]

## 2018-05-09 LAB
SHBG SERPL-SCNC: 112 NMOL/L (ref 30–135)
TESTOST FREE SERPL-MCNC: 0.07 NG/DL (ref 0.08–0.74)
TESTOST SERPL-MCNC: 13 NG/DL (ref 8–60)

## 2018-05-11 ENCOUNTER — INFUSION THERAPY VISIT (OUTPATIENT)
Dept: INFUSION THERAPY | Facility: CLINIC | Age: 28
End: 2018-05-11
Attending: INTERNAL MEDICINE
Payer: COMMERCIAL

## 2018-05-11 VITALS
DIASTOLIC BLOOD PRESSURE: 71 MMHG | RESPIRATION RATE: 16 BRPM | TEMPERATURE: 99.3 F | SYSTOLIC BLOOD PRESSURE: 136 MMHG | HEART RATE: 88 BPM

## 2018-05-11 DIAGNOSIS — D50.0 IRON DEFICIENCY ANEMIA DUE TO CHRONIC BLOOD LOSS: Primary | ICD-10-CM

## 2018-05-11 PROCEDURE — 25000128 H RX IP 250 OP 636: Performed by: INTERNAL MEDICINE

## 2018-05-11 PROCEDURE — 96374 THER/PROPH/DIAG INJ IV PUSH: CPT

## 2018-05-11 RX ADMIN — FERRIC CARBOXYMALTOSE INJECTION 750 MG: 50 INJECTION, SOLUTION INTRAVENOUS at 15:50

## 2018-05-11 NOTE — PROGRESS NOTES
Infusion Nursing Note:  Karen Orellana presents today for injectafer, dose 2 of 2.    Patient seen by provider today: No   present during visit today: Not Applicable.    Note: Patient did have increased tiredness and nausea after last iron infusion.      Intravenous Access:  Peripheral IV placed.    Treatment Conditions:  Not Applicable.      Post Infusion Assessment:  Patient tolerated infusion without incident.  Patient observed for 30 minutes post injectafer per protocol.  Blood return noted pre and post infusion.  Site patent and intact, free from redness, edema or discomfort.  No evidence of extravasations.  Access discontinued per protocol.    Discharge Plan:   Patient discharged in stable condition accompanied by: father.  Departure Mode: Ambulatory.    Maggie Schwartz RN

## 2018-05-11 NOTE — MR AVS SNAPSHOT
After Visit Summary   5/11/2018    Karen Orellana    MRN: [id number]           Patient Information     Date Of Birth          1990        Visit Information        Provider Department      5/11/2018 3:30 PM [facility] INFUSION CHAIR 1 [facility]        Today's Diagnoses     Iron deficiency anemia due to chronic blood loss    -  1       Follow-ups after your visit        Your next 10 appointments already scheduled     May 16, 2018  4:30 PM CDT   MR ABDOMEN W/O & W CONTRAST with SHMRP1   [facility] ([facility])    [address] [place] [id number] [phone]           Take your medicines as usual, unless your doctor tells you not to. Bring a list of your current medicines to your exam (including vitamins, minerals and over-the-counter drugs). Also bring the results of similar scans you may have had.    You may or may not receive IV contrast for this exam pending the discretion of the Radiologist.   Do not eat or drink for 6 hours prior to exam.  The MRI machine uses a strong magnet. Please wear clothes without metal (snaps, zippers). A sweatsuit works well, or we may give you a hospital gown.  Please remove any body piercings and hair extensions before you arrive. You will also remove watches, jewelry, hairpins, wallets, dentures, partial dental plates and hearing aids. You may wear contact lenses, and you may be able to wear your rings. We have a safe place to keep your personal items, but it is safer to leave them at home.  **IMPORTANT** THE INSTRUCTIONS BELOW ARE ONLY FOR THOSE PATIENTS WHO HAVE BEEN PRESCRIBED SEDATION OR GENERAL ANESTHESIA DURING THEIR MRI PROCEDURE:  IF YOUR DOCTOR PRESCRIBED ORAL SEDATION (take medicine to help you relax during your exam):   You must get the medicine from your doctor (oral medication) before you arrive. Bring the medicine to the exam. Do not take it at home. You ll be told when to  take it upon arriving for your exam.   Arrive one hour early. Bring someone who can take you home after the test. Your medicine will make you sleepy. After the exam, you may not drive, take a bus or take a taxi by yourself.  IF YOUR DOCTOR PRESCRIBED IV SEDATION:   Arrive one hour early. Bring someone who can take you home after the test. Your medicine will make you sleepy. After the exam, you may not drive, take a bus or take a taxi by yourself.   No eating 6 hours before your exam. You may have clear liquids up until 4 hours before your exam. (Clear liquids include water, clear tea, black coffee and fruit juice without pulp.)  IF YOUR DOCTOR PRESCRIBED ANESTHESIA (be asleep for your exam):   Arrive 1 1/2 hours early. Bring someone who can take you home after the test. You may not drive, take a bus or take a taxi by yourself.   No eating 8 hours before your exam. You may have clear liquids up until 4 hours before your exam. (Clear liquids include water, clear tea, black coffee and fruit juice without pulp.)   You will spend four to five hours in the recovery room.  If you have any questions, please contact your Imaging Department exam site.            May 16, 2018  5:30 PM CDT   MR PELVIS W/O & W CONTRAST with SHMRP1   [facility] ([facility])    [address] [place] [id number] [phone]           Take your medicines as usual, unless your doctor tells you not to. Bring a list of your current medicines to your exam (including vitamins, minerals and over-the-counter drugs).  You may or may not receive intravenous (IV) contrast for this exam pending the discretion of the Radiologist.  You do not need to do anything special to prepare.  The MRI machine uses a strong magnet. Please wear clothes without metal (snaps, zippers). A sweatsuit works well, or we may give you a hospital gown.  Please remove any body piercings and hair extensions before you arrive. You will also  remove watches, jewelry, hairpins, wallets, dentures, partial dental plates and hearing aids. You may wear contact lenses, and you may be able to wear your rings. We have a safe place to keep your personal items, but it is safer to leave them at home.  **IMPORTANT** THE INSTRUCTIONS BELOW ARE ONLY FOR THOSE PATIENTS WHO HAVE BEEN PRESCRIBED SEDATION OR GENERAL ANESTHESIA DURING THEIR MRI PROCEDURE:  IF YOUR DOCTOR PRESCRIBED ORAL SEDATION (take medicine to help you relax during your exam):   You must get the medicine from your doctor (oral medication) before you arrive. Bring the medicine to the exam. Do not take it at home. You ll be told when to take it upon arriving for your exam.   Arrive one hour early. Bring someone who can take you home after the test. Your medicine will make you sleepy. After the exam, you may not drive, take a bus or take a taxi by yourself.  IF YOUR DOCTOR PRESCRIBED IV SEDATION:   Arrive one hour early. Bring someone who can take you home after the test. Your medicine will make you sleepy. After the exam, you may not drive, take a bus or take a taxi by yourself.   No eating 6 hours before your exam. You may have clear liquids up until 4 hours before your exam. (Clear liquids include water, clear tea, black coffee and fruit juice without pulp.)  IF YOUR DOCTOR PRESCRIBED ANESTHESIA (be asleep for your exam):   Arrive 1 1/2 hours early. Bring someone who can take you home after the test. You may not drive, take a bus or take a taxi by yourself.   No eating 8 hours before your exam. You may have clear liquids up until 4 hours before your exam. (Clear liquids include water, clear tea, black coffee and fruit juice without pulp.)   You will spend four to five hours in the recovery room.  Please call the Imaging Department at your exam site with any questions.            May 24, 2018  6:00 PM CDT   New Visit with MARCELA Au CNP   [facility] ([facility]  [place]) [address]  [facility] [id number] [phone]            Jun 25, 2018  3:00 PM CDT   Return Visit with Michelle Maciel MD   [facility] ([facility])    [facility]  [address]  [facility] [id number] [phone]              Who to contact     If you have questions or need follow up information about today's clinic visit or your schedule please contact [facility] directly at [phone].  Normal or non-critical lab and imaging results will be communicated to you by Evolerohart, letter or phone within 4 business days after the clinic has received the results. If you do not hear from us within 7 days, please contact the clinic through Evolerohart or phone. If you have a critical or abnormal lab result, we will notify you by phone as soon as possible.  Submit refill requests through AdexLink or call your pharmacy and they will forward the refill request to us. Please allow 3 business days for your refill to be completed.          Additional Information About Your Visit        MyChart Information     AdexLink gives you secure access to your electronic health record. If you see a primary care provider, you can also send messages to your care team and make appointments. If you have questions, please call your primary care clinic.  If you do not have a primary care provider, please call [phone] and they will assist you.        Care EveryWhere ID     This is your Care EveryWhere ID. This could be used by other organizations to access your [place] medical records  [id number]        Your Vitals Were     Pulse Temperature Respirations Last Period          88 99.3  F (37.4  C) (Tympanic) 16 05/02/2018         Blood Pressure from Last 3 Encounters:   05/11/18 136/71   05/04/18 112/72   05/04/18 129/85    Weight from Last 3 Encounters:   05/04/18 103.4 kg (228 lb)   04/30/18 103.4 kg (228 lb)    04/06/18 106.6 kg (235 lb)              Today, you had the following     No orders found for display       Primary Care Provider Office Phone # Fax #    Chase Villafuerte [phone]       [address]  [facility] [address]        Equal Access to Services     YVETTE CLEARY : Hadii aad ku hadasho Soomaali, waaxda luqadaha, qaybta kaalmada adeegyada, waxay stevein hayjenifern julianne awildamerly larafat . So [facility] [phone].    ATENCIÓN: Si habla español, tiene a velazquez disposición servicios gratuitos de asistencia lingüística. [facility] [phone].    We comply with applicable federal civil rights laws and [place] laws. We do not discriminate on the basis of race, color, national origin, age, disability, sex, sexual orientation, or gender identity.            Thank you!     Thank you for choosing [facility]  for your care. Our goal is always to provide you with excellent care. Hearing back from our patients is one way we can continue to improve our services. Please take a few minutes to complete the written survey that you may receive in the mail after your visit with us. Thank you!             Your Updated Medication List - Protect others around you: Learn how to safely use, store and throw away your medicines at www.disposemymeds.org.          This list is accurate as of 5/11/18  4:20 PM.  Always use your most recent med list.                   Brand Name Dispense Instructions for use Diagnosis    aspirin-acetaminophen-caffeine 250-250-65 MG per tablet    EXCEDRIN MIGRAINE    80 tablet    Take 1 tablet by mouth every 6 hours as needed for headaches    Migraine without aura and without status migrainosus, not intractable       desogestrel-ethinyl estradiol 0.15-30 MG-MCG per tablet    APRI    84 tablet    Take 1 tablet by mouth daily    Excessive or frequent menstruation, PCOS (polycystic ovarian syndrome)       ferrous sulfate 325 (65 Fe) MG tablet    IRON    90 tablet     Take 1 tablet (325 mg) by mouth 3 times daily (with meals)    Iron deficiency anemia, unspecified iron deficiency anemia type

## 2018-05-16 ENCOUNTER — HOSPITAL ENCOUNTER (OUTPATIENT)
Dept: MRI IMAGING | Facility: CLINIC | Age: 28
Discharge: HOME | End: 2018-05-16
Attending: OBSTETRICS & GYNECOLOGY | Admitting: OBSTETRICS & GYNECOLOGY
Payer: COMMERCIAL

## 2018-05-16 DIAGNOSIS — Q51.3 BICORNUATE UTERUS: ICD-10-CM

## 2018-05-16 PROCEDURE — 72197 MRI PELVIS W/O & W/DYE: CPT

## 2018-05-16 PROCEDURE — 25000128 H RX IP 250 OP 636: Performed by: OBSTETRICS & GYNECOLOGY

## 2018-05-16 PROCEDURE — A9585 GADOBUTROL INJECTION: HCPCS | Performed by: OBSTETRICS & GYNECOLOGY

## 2018-05-16 RX ORDER — GADOBUTROL 604.72 MG/ML
10 INJECTION INTRAVENOUS ONCE
Status: COMPLETED | OUTPATIENT
Start: 2018-05-16 | End: 2018-05-16

## 2018-05-16 RX ADMIN — GADOBUTROL 10 ML: 604.72 INJECTION INTRAVENOUS at 16:54

## 2018-05-23 ENCOUNTER — OFFICE VISIT (OUTPATIENT)
Dept: OBGYN | Facility: CLINIC | Age: 28
End: 2018-05-23
Payer: COMMERCIAL

## 2018-05-23 VITALS
BODY MASS INDEX: 36.15 KG/M2 | WEIGHT: 224 LBS | SYSTOLIC BLOOD PRESSURE: 116 MMHG | HEART RATE: 76 BPM | DIASTOLIC BLOOD PRESSURE: 60 MMHG

## 2018-05-23 DIAGNOSIS — N92.0 EXCESSIVE OR FREQUENT MENSTRUATION: Primary | ICD-10-CM

## 2018-05-23 DIAGNOSIS — E28.2 PCOS (POLYCYSTIC OVARIAN SYNDROME): ICD-10-CM

## 2018-05-23 DIAGNOSIS — Q51.28 SEPTATE UTERUS: ICD-10-CM

## 2018-05-23 PROCEDURE — 99214 OFFICE O/P EST MOD 30 MIN: CPT | Performed by: OBSTETRICS & GYNECOLOGY

## 2018-05-23 RX ORDER — NORETHINDRONE ACETATE AND ETHINYL ESTRADIOL 1.5-30(21)
1 KIT ORAL DAILY
Qty: 84 TABLET | Refills: 3 | Status: SHIPPED | OUTPATIENT
Start: 2018-05-23 | End: 2018-10-25

## 2018-05-23 NOTE — MR AVS SNAPSHOT
After Visit Summary   5/23/2018    Karen Orellana    MRN: [id number]           Patient Information     Date Of Birth          1990        Visit Information        Provider Department      5/23/2018 3:45 PM Jen Henry MD [facility]        Today's Diagnoses     Excessive or frequent menstruation    -  1    Septate uterus        PCOS (polycystic ovarian syndrome)           Follow-ups after your visit        Your next 10 appointments already scheduled     May 24, 2018  6:00 PM CDT   New Visit with MARCELA Au CNP   [facility]    [address]  [facility] [id number] [phone]            Jun 25, 2018  3:00 PM CDT   Return Visit with Michelle Maciel MD   [facility] ([facility])    [facility]  [address]  [facility] [id number] [phone]              Who to contact     If you have questions or need follow up information about today's clinic visit or your schedule please contact [facility] directly at [phone].  Normal or non-critical lab and imaging results will be communicated to you by MyChart, letter or phone within 4 business days after the clinic has received the results. If you do not hear from us within 7 days, please contact the clinic through AquarisPLUS Inthart or phone. If you have a critical or abnormal lab result, we will notify you by phone as soon as possible.  Submit refill requests through [employer] or call your pharmacy and they will forward the refill request to us. Please allow 3 business days for your refill to be completed.          Additional Information About Your Visit        MyChart Information     [employer] gives you secure access to your electronic health record. If you see a primary care provider, you can also send messages to your care team and make appointments. If you have  questions, please call your primary care clinic.  If you do not have a primary care provider, please call [phone] and they will assist you.        Care EveryWhere ID     This is your Care EveryWhere ID. This could be used by other organizations to access your [place] medical records  [id number]        Your Vitals Were     Pulse Last Period Breastfeeding? BMI (Body Mass Index)          76 05/02/2018 No 36.15 kg/m2         Blood Pressure from Last 3 Encounters:   05/23/18 116/60   05/11/18 136/71   05/04/18 112/72    Weight from Last 3 Encounters:   05/23/18 224 lb (101.6 kg)   05/04/18 228 lb (103.4 kg)   04/30/18 228 lb (103.4 kg)              Today, you had the following     No orders found for display         Today's Medication Changes          These changes are accurate as of 5/23/18  5:35 PM.  If you have any questions, ask your nurse or doctor.               Start taking these medicines.        Dose/Directions    norethindrone-ethinyl estradiol-iron 1.5-30 MG-MCG per tablet   Commonly known as:  MICROGESTIN FE1.5/30   Used for:  Excessive or frequent menstruation   Started by:  Jen Henry MD        Dose:  1 tablet   Take 1 tablet by mouth daily   Quantity:  84 tablet   Refills:  3            Where to get your medicines      These medications were sent to [id number] - [facility] [address]  [address] [facility] [address]    Hours:  9Am-9Pm (M-F) 9Am-6Pm (S&S) Phone:  [phone]     norethindrone-ethinyl estradiol-iron 1.5-30 MG-MCG per tablet                Primary Care Provider Office Phone # Fax #    Chase Villafuerte [phone]       [address]  [facility] [address]        Equal Access to Services     YVETTE CLEARY : Jose Juan Hoffmann, washannon luqarun, qaybta kaaldaniel goodman, tawanda dash. [facility] [phone].    ATENCIÓN: Si habla español, tiene a velazquez disposición servicios gratuitos de asistencia  lingüística. Jonn al [phone].    We comply with applicable federal civil rights laws and [place] laws. We do not discriminate on the basis of race, color, national origin, age, disability, sex, sexual orientation, or gender identity.            Thank you!     Thank you for choosing [facility]  for your care. Our goal is always to provide you with excellent care. Hearing back from our patients is one way we can continue to improve our services. Please take a few minutes to complete the written survey that you may receive in the mail after your visit with us. Thank you!             Your Updated Medication List - Protect others around you: Learn how to safely use, store and throw away your medicines at www.disposemymeds.org.          This list is accurate as of 5/23/18  5:35 PM.  Always use your most recent med list.                   Brand Name Dispense Instructions for use Diagnosis    aspirin-acetaminophen-caffeine 250-250-65 MG per tablet    EXCEDRIN MIGRAINE    80 tablet    Take 1 tablet by mouth every 6 hours as needed for headaches    Migraine without aura and without status migrainosus, not intractable       desogestrel-ethinyl estradiol 0.15-30 MG-MCG per tablet    APRI    84 tablet    Take 1 tablet by mouth daily    Excessive or frequent menstruation, PCOS (polycystic ovarian syndrome)       ferrous sulfate 325 (65 Fe) MG tablet    IRON    90 tablet    Take 1 tablet (325 mg) by mouth 3 times daily (with meals)    Iron deficiency anemia, unspecified iron deficiency anemia type       norethindrone-ethinyl estradiol-iron 1.5-30 MG-MCG per tablet    MICROGESTIN FE1.5/30    84 tablet    Take 1 tablet by mouth daily    Excessive or frequent menstruation

## 2018-05-23 NOTE — PROGRESS NOTES
"  SUBJECTIVE:                                                   Karen Orellana is a 27 year old female who presents to clinic today to follow up for PCOS diagnosis and also newly diagnosed septate uterus, confirmed with MRI. No renal or collecting system anomalies are noted. Please see my last note for complete details. Her mother accompanies her today.    She notes that she still has intermenstrual bleeding on the oral contraceptive pills, which she started in early April. Discussed that this can be normal, but she is interested in maybe trying a new pill. She also has several questions about her iron stores that I think are best addressed by her hematologist, including if she needs more infusions and also when to recheck labs.    She is concerned about diabetes. She had a normal fasting glucose and insulin when checked earlier. Her father is diabetic, and she has witnessed hypoglycemic episodes, and is concerned that she had one today. She became shaky and sweaty, near-syncopal, and felt \"terrible.\" she ate/drank something and felt better. This was between 10:30 and 11 am following a short walk outside, after last meal breakfast at 6:30am (oatmeal packet, prepared, with apple, then coffee and also animal crackers at her desk.) She does see Ale Chisholm NP tomorrow.      Problem list and histories reviewed & adjusted, as indicated.  Additional history: as documented.    Patient Active Problem List   Diagnosis     Iron deficiency anemia     CARDIOVASCULAR SCREENING; LDL GOAL LESS THAN 160     Morbid obesity due to excess calories (H)     Migraine without aura and without status migrainosus, not intractable     Iron deficiency anemia due to chronic blood loss     Past Surgical History:   Procedure Laterality Date     NO HISTORY OF SURGERY        Social History   Substance Use Topics     Smoking status: Never Smoker     Smokeless tobacco: Never Used     Alcohol use Yes      Comment: 1-2 times per week      " Problem (# of Occurrences) Relation (Name,Age of Onset)    Asthma (1) Mother    Breast Cancer (1) Maternal Grandmother    DIABETES (5) Father, Paternal Grandmother, Paternal Grandfather, Maternal Uncle, Maternal Aunt    Hypertension (2) Mother, Maternal Grandmother    Hypoglycemia (1) Mother              Current Outpatient Prescriptions on File Prior to Visit:  aspirin-acetaminophen-caffeine (EXCEDRIN MIGRAINE) 250-250-65 MG per tablet Take 1 tablet by mouth every 6 hours as needed for headaches   desogestrel-ethinyl estradiol (APRI) 0.15-30 MG-MCG per tablet Take 1 tablet by mouth daily   ferrous sulfate (IRON) 325 (65 FE) MG tablet Take 1 tablet (325 mg) by mouth 3 times daily (with meals)     No current facility-administered medications on file prior to visit.   Allergies   Allergen Reactions     No Known Drug Allergies        ROS:  5 point ROS negative except as noted above in HPI, including Gen., Resp., CV, GI &  system review.    OBJECTIVE:     No exam was necessary today as this was entirely a counseling appointment.    In-Clinic Test Results:  No results found for this or any previous visit (from the past 24 hour(s)).    ASSESSMENT/PLAN:                                                        ICD-10-CM    1. Excessive or frequent menstruation N92.0 norethindrone-ethinyl estradiol-iron (MICROGESTIN FE1.5/30) 1.5-30 MG-MCG per tablet   2. Septate uterus Q51.2    3. PCOS (polycystic ovarian syndrome) E28.2          -Will switch the oral contraceptive pills at her request, though she needs to give this one 3 months to work.  -PCOS: sees Ale tomorrow, will need some diet counseling as well. Concerned about possibly episode of hypoglycemia, will discuss with Ale as well. Suspect she is eating too many carbs/sugars and not enough protein.  -Septate uterus: discussed the implications of this, which are minimal for now but which can impact fertility, especially in terms of increased rate of miscarriage. This  should be resected prior to attempting pregnancy. She will consider this and will let me know if she would like to pursue this at any time. Briefly discussed the procedure and expected recovery.    Today I spent 30 minutes with the patient, of which 30 minutes was spent reviewing the above diagnoses, using diagrams to answer questions, and reviewing with her the MRI images.      Jen Henry MD  [facility]

## 2018-05-23 NOTE — NURSING NOTE
"Chief Complaint   Patient presents with     RECHECK     Follow up to discuss MRI results from 18.        Initial /60  Pulse 76  Wt 224 lb (101.6 kg)  LMP 2018  Breastfeeding? No  BMI 36.15 kg/m2 Estimated body mass index is 36.15 kg/(m^2) as calculated from the following:    Height as of 18: 5' 6\" (1.676 m).    Weight as of this encounter: 224 lb (101.6 kg).  BP completed using cuff size: regular        Meg Spring LPN               "

## 2018-05-24 ENCOUNTER — OFFICE VISIT (OUTPATIENT)
Dept: ENDOCRINOLOGY | Facility: CLINIC | Age: 28
End: 2018-05-24
Payer: COMMERCIAL

## 2018-05-24 VITALS
DIASTOLIC BLOOD PRESSURE: 66 MMHG | WEIGHT: 226.3 LBS | HEART RATE: 83 BPM | SYSTOLIC BLOOD PRESSURE: 99 MMHG | HEIGHT: 66 IN | TEMPERATURE: 97.9 F | BODY MASS INDEX: 36.37 KG/M2

## 2018-05-24 DIAGNOSIS — E28.2 PCOS (POLYCYSTIC OVARIAN SYNDROME): Primary | ICD-10-CM

## 2018-05-24 DIAGNOSIS — Z83.3 FAMILY HISTORY OF DIABETES MELLITUS: ICD-10-CM

## 2018-05-24 DIAGNOSIS — E66.01 MORBID OBESITY DUE TO EXCESS CALORIES (H): ICD-10-CM

## 2018-05-24 PROCEDURE — 99204 OFFICE O/P NEW MOD 45 MIN: CPT | Performed by: CLINICAL NURSE SPECIALIST

## 2018-05-24 NOTE — PROGRESS NOTES
Name: Karen Orellana  Seen at the request of Jen Henry For PCOS.  HPI:  Karen Orellana is a 27 year old female who presents for the evaluation of PCOS.  She comes today accompanied by her mother.    Age of Menarche: 12 or 13 years.  Intervals of periods:   Regular menses? No always irregular, still has irregular menses while on oral contraceptives  Started oral contraceptives in early April 2018.    Hair pattern and growth: + hirsutism  Pregnancy desire: eventually, no upcoming pregnancy plans  Concerned about risks of type 2 diabetes.  FH is + for father, PGM, PGF, maternal aunt and maternal uncle.  PMH/PSH:  Past Medical History:   Diagnosis Date     Anemia      Past Surgical History:   Procedure Laterality Date     NO HISTORY OF SURGERY       Family Hx:  Family History   Problem Relation Age of Onset     DIABETES Father      Asthma Mother      Hypertension Mother      Hypoglycemia Mother      DIABETES Paternal Grandmother      DIABETES Paternal Grandfather      DIABETES Maternal Uncle      DIABETES Maternal Aunt      Hypertension Maternal Grandmother      Breast Cancer Maternal Grandmother      Thyroid disease:          DM2: yes, father, PGM, PGF, maternal aunt, maternal uncel         Autoimmune: DM1, SLE, RA, Vitiligo     Social Hx:  Social History     Social History     Marital status: Single     Spouse name: N/A     Number of children: N/A     Years of education: N/A     Occupational History     Not on file.     Social History Main Topics     Smoking status: Never Smoker     Smokeless tobacco: Never Used     Alcohol use Yes      Comment: 1-2 times per week     Drug use: No     Sexual activity: No     Other Topics Concern     Not on file     Social History Narrative          MEDICATIONS:  has a current medication list which includes the following prescription(s): norethindrone-ethinyl estradiol-iron and ferrous sulfate.    ROS     ROS: 10 point ROS neg other than the symptoms noted above in  "the HPI.    Physical Exam   VS: BP 99/66 (BP Location: Left arm, Patient Position: Chair, Cuff Size: Adult Large)  Pulse 83  Temp 97.9  F (36.6  C) (Oral)  Ht 5' 6\" (1.676 m)  Wt 226 lb 4.8 oz (102.6 kg)  LMP 05/02/2018  BMI 36.53 kg/m2  GENERAL: AXOX3, NAD, well dressed, answering questions appropriately, appears stated age.  HEENT: no exopthalmous, no proptosis, no lig lag, no retraction  NECK:  Supple, no thyromegaly, no adenopathy  CV: RRR, no rubs, gallops, no murmurs  LUNGS: CTAB, no wheezes, rales, or ronchi  ABDOMEN: nondistended  EXTREMITIES: no edema, +pulses, no rashes, no lesions  NEUROLOGY: CN grossly intact, no tremors  MSK: grossly intact    LABS:  A1c  BMP  !COMPREHENSIVE Latest Ref Rng & Units 4/30/2018   SODIUM 133 - 144 mmol/L 137   POTASSIUM 3.4 - 5.3 mmol/L 3.9   CHLORIDE 94 - 109 mmol/L 106   BUN 7 - 30 mg/dL 8   Creatinine 0.52 - 1.04 mg/dL 0.68   Glucose 70 - 99 mg/dL 79   ANION GAP 3 - 14 mmol/L 7   CALCIUM 8.5 - 10.1 mg/dL 8.7   ALBUMIN 3.4 - 5.0 g/dL 3.6     LFTs/Lipids  Prolactin  FSH/LH  17 - OHP  TFTs    All pertinent notes, labs, and images personally reviewed by me.     A/P  Ms.Ashley Yamila Orellana is a 27 year old here for the evaluation of PCOS:    1. Polycystic ovary syndrome (PCOS) is an ovarian disorder characterized by hyperandrogenism, ovulatory dysfunction, and polycystic ovaries. It may be the most common female endocrinopathy in the world. PCOS affects young women with oligo-ovulation (which can lead to oligomenorrhea), infertility, acne and hirsutism. PCOS increases the risk of obesity, CV disease, CARDENAS, Type 2 DM, infertility, and acanthos nigricians.    The exact diagnosis of PCOS is debatable however there is a unifying trend to all diagnostic criteria. Hyperandrogenism establishes on the basis of clinical findings (eg, hirsutism or acne) and/or serum hormone measurement (testosterone levels).     Generally every women with signs and symptoms of PCOS should be " screened for thryroid dysfunction, prolactin excess, and non classical congenital adrenal hyperplasia (17-hydroxyprogesterone).    The [place]ter[place] criteria is preferred for establishing a diagnosis of PCOS.  The diagnosis of PCOS is made based on meeting two out of three criteria: Oligo - and/or anovulation, clinical and/or biochemical signs of hyperadrogenism, or polycystic ovaries documented by ultrasound.  The diagnosis of PCOS is only confirmed when other conditions that mimic PCOS are excluded, including thyroid disease, nonclassic congenital adrenal hyperplasia, hyperprolactinemia, and androgen-secreting tumors.  Recommend diet, exercise, and weight loss.  Follow a 7039-0810 calorie meal plan.  Will obtain 3 hour GTT, consider treatment with metformin if indicated.  Referral to dietician provided, she'll check on insurance coverage.    Labs ordered today: No orders of the defined types were placed in this encounter.    Radiology/Consults ordered today: None    More than 50% of the time spent with Ms. Orellana on counseling / coordinating her care.  Total face to face time was 45 minutes.      Follow-up:  To be determined based on GTT    Ale Chisholm NP  Endocrinology  [facility]  CC: Jen Henry

## 2018-05-24 NOTE — PATIENT INSTRUCTIONS
Recommend a 9740-5764 calories per day.  Track intake with My Fitness Pal.    Weight and measure foods so portions and calorie counts are accurate.    Websites:    My fitness pal    [place] Move program - handouts    Eating Well.com  Hungrygirl.com  - recipes.    Meal [employer] - nice restaurant guide + other resources.    Schedule the glucose tolerance test.    Consider stating metformin depending on results.    Follow up 4-6 weeks.    Ale Chisholm NP  Endocrinology

## 2018-05-24 NOTE — LETTER
5/24/2018         RE: Karen Orellana  [address]  [place] [address]        Dear Colleague,    Thank you for referring your patient, Karen Orellana, to the [facility]. Please see a copy of my visit note below.    Name: Karen Orellana  Seen at the request of Jen Henry For PCOS.  HPI:  Karen Orellana is a 27 year old female who presents for the evaluation of PCOS.  She comes today accompanied by her mother.    Age of Menarche: 12 or 13 years.  Intervals of periods:   Regular menses? No always irregular, still has irregular menses while on oral contraceptives  Started oral contraceptives in early April 2018.    Hair pattern and growth: + hirsutism  Pregnancy desire: eventually, no upcoming pregnancy plans  Concerned about risks of type 2 diabetes.  FH is + for father, PGM, PGF, maternal aunt and maternal uncle.  PMH/PSH:  Past Medical History:   Diagnosis Date     Anemia      Past Surgical History:   Procedure Laterality Date     NO HISTORY OF SURGERY       Family Hx:  Family History   Problem Relation Age of Onset     DIABETES Father      Asthma Mother      Hypertension Mother      Hypoglycemia Mother      DIABETES Paternal Grandmother      DIABETES Paternal Grandfather      DIABETES Maternal Uncle      DIABETES Maternal Aunt      Hypertension Maternal Grandmother      Breast Cancer Maternal Grandmother      Thyroid disease:          DM2: yes, father, PGM, PGF, maternal aunt, maternal uncel         Autoimmune: DM1, SLE, RA, Vitiligo     Social Hx:  Social History     Social History     Marital status: Single     Spouse name: N/A     Number of children: N/A     Years of education: N/A     Occupational History     Not on file.     Social History Main Topics     Smoking status: Never Smoker     Smokeless tobacco: Never Used     Alcohol use Yes      Comment: 1-2 times per week     Drug use: No     Sexual activity: No     Other Topics Concern     Not  "on file     Social History Narrative          MEDICATIONS:  has a current medication list which includes the following prescription(s): norethindrone-ethinyl estradiol-iron and ferrous sulfate.    ROS     ROS: 10 point ROS neg other than the symptoms noted above in the HPI.    Physical Exam   VS: BP 99/66 (BP Location: Left arm, Patient Position: Chair, Cuff Size: Adult Large)  Pulse 83  Temp 97.9  F (36.6  C) (Oral)  Ht 5' 6\" (1.676 m)  Wt 226 lb 4.8 oz (102.6 kg)  LMP 05/02/2018  BMI 36.53 kg/m2  GENERAL: AXOX3, NAD, well dressed, answering questions appropriately, appears stated age.  HEENT: no exopthalmous, no proptosis, no lig lag, no retraction  NECK:  Supple, no thyromegaly, no adenopathy  CV: RRR, no rubs, gallops, no murmurs  LUNGS: CTAB, no wheezes, rales, or ronchi  ABDOMEN: nondistended  EXTREMITIES: no edema, +pulses, no rashes, no lesions  NEUROLOGY: CN grossly intact, no tremors  MSK: grossly intact    LABS:  A1c  BMP  !COMPREHENSIVE Latest Ref Rng & Units 4/30/2018   SODIUM 133 - 144 mmol/L 137   POTASSIUM 3.4 - 5.3 mmol/L 3.9   CHLORIDE 94 - 109 mmol/L 106   BUN 7 - 30 mg/dL 8   Creatinine 0.52 - 1.04 mg/dL 0.68   Glucose 70 - 99 mg/dL 79   ANION GAP 3 - 14 mmol/L 7   CALCIUM 8.5 - 10.1 mg/dL 8.7   ALBUMIN 3.4 - 5.0 g/dL 3.6     LFTs/Lipids  Prolactin  FSH/LH  17 - OHP  TFTs    All pertinent notes, labs, and images personally reviewed by me.     A/P  Ms.Ashley Yamila Orellana is a 27 year old here for the evaluation of PCOS:    1. Polycystic ovary syndrome (PCOS) is an ovarian disorder characterized by hyperandrogenism, ovulatory dysfunction, and polycystic ovaries. It may be the most common female endocrinopathy in the world. PCOS affects young women with oligo-ovulation (which can lead to oligomenorrhea), infertility, acne and hirsutism. PCOS increases the risk of obesity, CV disease, CARDENAS, Type 2 DM, infertility, and acanthos nigricians.    The exact diagnosis of PCOS is debatable however " there is a unifying trend to all diagnostic criteria. Hyperandrogenism establishes on the basis of clinical findings (eg, hirsutism or acne) and/or serum hormone measurement (testosterone levels).     Generally every women with signs and symptoms of PCOS should be screened for thryroid dysfunction, prolactin excess, and non classical congenital adrenal hyperplasia (17-hydroxyprogesterone).    The [place]ter[place] criteria is preferred for establishing a diagnosis of PCOS.  The diagnosis of PCOS is made based on meeting two out of three criteria: Oligo - and/or anovulation, clinical and/or biochemical signs of hyperadrogenism, or polycystic ovaries documented by ultrasound.  The diagnosis of PCOS is only confirmed when other conditions that mimic PCOS are excluded, including thyroid disease, nonclassic congenital adrenal hyperplasia, hyperprolactinemia, and androgen-secreting tumors.  Recommend diet, exercise, and weight loss.  Follow a 5805-7698 calorie meal plan.  Will obtain 3 hour GTT, consider treatment with metformin if indicated.  Referral to dietician provided, she'll check on insurance coverage.    Labs ordered today: No orders of the defined types were placed in this encounter.    Radiology/Consults ordered today: None    More than 50% of the time spent with Ms. Orellana on counseling / coordinating her care.  Total face to face time was 45 minutes.      Follow-up:  To be determined based on GTT    Ale Chisholm NP  Endocrinology  [facility]  CC: Jen Henry      Again, thank you for allowing me to participate in the care of your patient.        Sincerely,        MARCELA Au CNP

## 2018-05-24 NOTE — MR AVS SNAPSHOT
After Visit Summary   5/24/2018    Karen Orellana    MRN: [id number]           Patient Information     Date Of Birth          1990        Visit Information        Provider Department      5/24/2018 6:00 PM Ale Chisholm APRN [facility]        Today's Diagnoses     PCOS (polycystic ovarian syndrome)    -  1    Family history of diabetes mellitus        Morbid obesity due to excess calories (H)          Care Instructions        Recommend a 6509-4351 calories per day.  Track intake with My Fitness Pal.    Weight and measure foods so portions and calorie counts are accurate.    Websites:    My fitness pal    [place] Move program - handouts    Eating Well.com  Hungrygirl.com  - recipes.    Meal [employer] - nice restaurant guide + other resources.    Schedule the glucose tolerance test.    Consider stating metformin depending on results.    Follow up 4-6 weeks.    Ale Chisholm NP  Endocrinology                Follow-ups after your visit        Additional Services     NUTRITION REFERRAL       Your provider has referred you to: G: [facility], [phone]  [email or web address]     Please be aware that coverage of these services is subject to the terms and limitations of your health insurance plan.  Call member services at your health plan with any benefit or coverage questions.      Please bring the following with you to your appointment:    (1) This referral request  (2) Any documents given to you regarding this referral  (3) Any specific questions you have about diet and/or food choices                  Your next 10 appointments already scheduled     Jun 25, 2018  3:00 PM CDT   Return Visit with Michelle Maciel MD   [facility] ([facility])    [facility]  [address]  [facility] [id number] [phone]              Future tests that were  "ordered for you today     Open Future Orders        Priority Expected Expires Ordered    Glucose tolerance gest std 100 gm 3 hr Routine  5/24/2019 5/24/2018            Who to contact     If you have questions or need follow up information about today's clinic visit or your schedule please contact [facility] directly at [phone].  Normal or non-critical lab and imaging results will be communicated to you by MyChart, letter or phone within 4 business days after the clinic has received the results. If you do not hear from us within 7 days, please contact the clinic through [employer] or phone. If you have a critical or abnormal lab result, we will notify you by phone as soon as possible.  Submit refill requests through [employer] or call your pharmacy and they will forward the refill request to us. Please allow 3 business days for your refill to be completed.          Additional Information About Your Visit        MyChart Information     [employer] gives you secure access to your electronic health record. If you see a primary care provider, you can also send messages to your care team and make appointments. If you have questions, please call your primary care clinic.  If you do not have a primary care provider, please call [phone] and they will assist you.        Care EveryWhere ID     This is your Care EveryWhere ID. This could be used by other organizations to access your [place] medical records  [id number]        Your Vitals Were     Pulse Temperature Height Last Period BMI (Body Mass Index)       83 97.9  F (36.6  C) (Oral) 5' 6\" (1.676 m) 05/02/2018 36.53 kg/m2        Blood Pressure from Last 3 Encounters:   05/24/18 99/66   05/23/18 116/60   05/11/18 136/71    Weight from Last 3 Encounters:   05/24/18 226 lb 4.8 oz (102.6 kg)   05/23/18 224 lb (101.6 kg)   05/04/18 228 lb (103.4 kg)              We Performed the Following     NUTRITION REFERRAL        Primary Care Provider Office Phone # Fax " #    Chase Villafuerte -[phone]       [address]  [facility] [address]        Equal Access to Services     YVETTE CLEARY : Hadii aad ku hadlesapauline Hoffmann, rafaelasivlia careymileha, cruzgera acevedoprimo goodman, tawanda hartman laAntoniotano dash. So [facility] [phone].    ATENCIÓN: Si habla español, tiene a velazquez disposición servicios gratuitos de asistencia lingüística. Llame al [phone].    We comply with applicable federal civil rights laws and [place] laws. We do not discriminate on the basis of race, color, national origin, age, disability, sex, sexual orientation, or gender identity.            Thank you!     Thank you for choosing [facility]  for your care. Our goal is always to provide you with excellent care. Hearing back from our patients is one way we can continue to improve our services. Please take a few minutes to complete the written survey that you may receive in the mail after your visit with us. Thank you!             Your Updated Medication List - Protect others around you: Learn how to safely use, store and throw away your medicines at www.disposemymeds.org.          This list is accurate as of 5/24/18  7:02 PM.  Always use your most recent med list.                   Brand Name Dispense Instructions for use Diagnosis    ferrous sulfate 325 (65 Fe) MG tablet    IRON    90 tablet    Take 1 tablet (325 mg) by mouth 3 times daily (with meals)    Iron deficiency anemia, unspecified iron deficiency anemia type       norethindrone-ethinyl estradiol-iron 1.5-30 MG-MCG per tablet    MICROGESTIN FE1.5/30    84 tablet    Take 1 tablet by mouth daily    Excessive or frequent menstruation

## 2018-06-01 DIAGNOSIS — E28.2 PCOS (POLYCYSTIC OVARIAN SYNDROME): ICD-10-CM

## 2018-06-01 DIAGNOSIS — E66.01 MORBID OBESITY DUE TO EXCESS CALORIES (H): ICD-10-CM

## 2018-06-01 DIAGNOSIS — Z83.3 FAMILY HISTORY OF DIABETES MELLITUS: ICD-10-CM

## 2018-06-01 DIAGNOSIS — D50.0 IRON DEFICIENCY ANEMIA DUE TO CHRONIC BLOOD LOSS: ICD-10-CM

## 2018-06-01 LAB
ALBUMIN SERPL-MCNC: 3.6 G/DL (ref 3.4–5)
ALP SERPL-CCNC: 59 U/L (ref 40–150)
ALT SERPL W P-5'-P-CCNC: 31 U/L (ref 0–50)
ANION GAP SERPL CALCULATED.3IONS-SCNC: 9 MMOL/L (ref 3–14)
AST SERPL W P-5'-P-CCNC: 20 U/L (ref 0–45)
BASOPHILS # BLD AUTO: 0.1 10E9/L (ref 0–0.2)
BASOPHILS NFR BLD AUTO: 0.6 %
BILIRUB SERPL-MCNC: 0.3 MG/DL (ref 0.2–1.3)
BUN SERPL-MCNC: 7 MG/DL (ref 7–30)
CALCIUM SERPL-MCNC: 8.9 MG/DL (ref 8.5–10.1)
CHLORIDE SERPL-SCNC: 108 MMOL/L (ref 94–109)
CO2 SERPL-SCNC: 22 MMOL/L (ref 20–32)
CREAT SERPL-MCNC: 0.57 MG/DL (ref 0.52–1.04)
DIFFERENTIAL METHOD BLD: ABNORMAL
EOSINOPHIL # BLD AUTO: 0.4 10E9/L (ref 0–0.7)
EOSINOPHIL NFR BLD AUTO: 4.2 %
ERYTHROCYTE [DISTWIDTH] IN BLOOD BY AUTOMATED COUNT: 20 % (ref 10–15)
FERRITIN SERPL-MCNC: 596 NG/ML (ref 12–150)
GFR SERPL CREATININE-BSD FRML MDRD: >90 ML/MIN/1.7M2
GLUCOSE 2H P 75 G GLC PO SERPL-MCNC: 102 MG/DL (ref 60–200)
GLUCOSE PRE 75 G GLC PO SERPL-MCNC: 81 MG/DL (ref 60–126)
GLUCOSE SERPL-MCNC: 87 MG/DL (ref 70–99)
HCT VFR BLD AUTO: 37.5 % (ref 35–47)
HGB BLD-MCNC: 12.5 G/DL (ref 11.7–15.7)
IRON SATN MFR SERPL: 19 % (ref 15–46)
IRON SERPL-MCNC: 62 UG/DL (ref 35–180)
LYMPHOCYTES # BLD AUTO: 2.8 10E9/L (ref 0.8–5.3)
LYMPHOCYTES NFR BLD AUTO: 32.3 %
MCH RBC QN AUTO: 28 PG (ref 26.5–33)
MCHC RBC AUTO-ENTMCNC: 33.3 G/DL (ref 31.5–36.5)
MCV RBC AUTO: 84 FL (ref 78–100)
MONOCYTES # BLD AUTO: 0.5 10E9/L (ref 0–1.3)
MONOCYTES NFR BLD AUTO: 6.1 %
NEUTROPHILS # BLD AUTO: 4.9 10E9/L (ref 1.6–8.3)
NEUTROPHILS NFR BLD AUTO: 56.8 %
PLATELET # BLD AUTO: 421 10E9/L (ref 150–450)
POTASSIUM SERPL-SCNC: 4 MMOL/L (ref 3.4–5.3)
PROT SERPL-MCNC: 8.1 G/DL (ref 6.8–8.8)
RBC # BLD AUTO: 4.47 10E12/L (ref 3.8–5.2)
RETICS # AUTO: 111.3 10E9/L (ref 25–95)
RETICS/RBC NFR AUTO: 2.5 % (ref 0.5–2)
SODIUM SERPL-SCNC: 139 MMOL/L (ref 133–144)
TIBC SERPL-MCNC: 334 UG/DL (ref 240–430)
TRANSFERRIN SERPL-MCNC: 270 MG/DL (ref 210–360)
WBC # BLD AUTO: 8.7 10E9/L (ref 4–11)

## 2018-06-01 PROCEDURE — 83550 IRON BINDING TEST: CPT | Performed by: CLINICAL NURSE SPECIALIST

## 2018-06-01 PROCEDURE — 85025 COMPLETE CBC W/AUTO DIFF WBC: CPT | Performed by: CLINICAL NURSE SPECIALIST

## 2018-06-01 PROCEDURE — 84466 ASSAY OF TRANSFERRIN: CPT | Performed by: CLINICAL NURSE SPECIALIST

## 2018-06-01 PROCEDURE — 83540 ASSAY OF IRON: CPT | Performed by: CLINICAL NURSE SPECIALIST

## 2018-06-01 PROCEDURE — 85045 AUTOMATED RETICULOCYTE COUNT: CPT | Performed by: CLINICAL NURSE SPECIALIST

## 2018-06-01 PROCEDURE — 82947 ASSAY GLUCOSE BLOOD QUANT: CPT | Performed by: CLINICAL NURSE SPECIALIST

## 2018-06-01 PROCEDURE — 82728 ASSAY OF FERRITIN: CPT | Performed by: CLINICAL NURSE SPECIALIST

## 2018-06-01 PROCEDURE — 82950 GLUCOSE TEST: CPT | Performed by: CLINICAL NURSE SPECIALIST

## 2018-06-01 PROCEDURE — 36415 COLL VENOUS BLD VENIPUNCTURE: CPT | Performed by: CLINICAL NURSE SPECIALIST

## 2018-06-01 PROCEDURE — 80053 COMPREHEN METABOLIC PANEL: CPT | Performed by: CLINICAL NURSE SPECIALIST

## 2018-06-02 NOTE — PROGRESS NOTES
Karen,  Your glucose tolerance test was normal.  I do not see any signs of glucose intolerance.  Ale Chisholm NP  Endocrinology

## 2018-06-06 ENCOUNTER — ONCOLOGY VISIT (OUTPATIENT)
Dept: ONCOLOGY | Facility: CLINIC | Age: 28
End: 2018-06-06
Attending: INTERNAL MEDICINE
Payer: COMMERCIAL

## 2018-06-06 VITALS
RESPIRATION RATE: 16 BRPM | BODY MASS INDEX: 36.45 KG/M2 | DIASTOLIC BLOOD PRESSURE: 82 MMHG | SYSTOLIC BLOOD PRESSURE: 121 MMHG | OXYGEN SATURATION: 100 % | TEMPERATURE: 98.8 F | HEART RATE: 74 BPM | WEIGHT: 225.8 LBS

## 2018-06-06 DIAGNOSIS — D50.0 IRON DEFICIENCY ANEMIA DUE TO CHRONIC BLOOD LOSS: Primary | ICD-10-CM

## 2018-06-06 PROCEDURE — G0463 HOSPITAL OUTPT CLINIC VISIT: HCPCS

## 2018-06-06 PROCEDURE — 99213 OFFICE O/P EST LOW 20 MIN: CPT | Performed by: INTERNAL MEDICINE

## 2018-06-06 ASSESSMENT — PAIN SCALES - GENERAL: PAINLEVEL: NO PAIN (0)

## 2018-06-06 NOTE — MR AVS SNAPSHOT
After Visit Summary   6/6/2018    Karen Orellana    MRN: [id number]           Patient Information     Date Of Birth          1990        Visit Information        Provider Department      6/6/2018 2:15 PM Michelle Maciel MD [facility]        Today's Diagnoses     Iron deficiency anemia due to chronic blood loss    -  1      Care Instructions    1- Start taking oral iron once daily  2- Start B12 oral supplement daily  3- RTC MD 3 months with labs before- CBC diff, iron studies and retic count, B12          Follow-ups after your visit        Your next 10 appointments already scheduled     Jun 28, 2018  5:30 PM CDT   Return Visit with MARCELA Au CNP   [facility]    [address]  [facility] [id number] [phone]              Future tests that were ordered for you today     Open Future Orders        Priority Expected Expires Ordered    Ferritin Routine 9/6/2018 6/6/2019 6/6/2018    Vitamin B12 Routine 9/6/2018 6/6/2019 6/6/2018    Reticulocyte count Routine 9/6/2018 6/6/2019 6/6/2018    CBC with platelets differential Routine 9/6/2018 6/6/2019 6/6/2018    Iron and iron binding capacity Routine 9/6/2018 6/6/2019 6/6/2018    Transferrin Routine 9/6/2018 6/6/2019 6/6/2018            Who to contact     If you have questions or need follow up information about today's clinic visit or your schedule please contact [facility] directly at [phone].  Normal or non-critical lab and imaging results will be communicated to you by MyChart, letter or phone within 4 business days after the clinic has received the results. If you do not hear from us within 7 days, please contact the clinic through MyChart or phone. If you have a critical or abnormal lab result, we will notify you by phone as soon as possible.  Submit refill requests through [employer] or call your pharmacy and they will forward the refill  request to us. Please allow 3 business days for your refill to be completed.          Additional Information About Your Visit        [employer] CRM[employer] Information     [employer] gives you secure access to your electronic health record. If you see a primary care provider, you can also send messages to your care team and make appointments. If you have questions, please call your primary care clinic.  If you do not have a primary care provider, please call [phone] and they will assist you.        Care EveryWhere ID     This is your Care EveryWhere ID. This could be used by other organizations to access your [place] medical records  [id number]        Your Vitals Were     Pulse Temperature Respirations Pulse Oximetry BMI (Body Mass Index)       74 98.8  F (37.1  C) (Oral) 16 100% 36.45 kg/m2        Blood Pressure from Last 3 Encounters:   06/06/18 121/82   05/24/18 99/66   05/23/18 116/60    Weight from Last 3 Encounters:   06/06/18 102.4 kg (225 lb 12.8 oz)   05/24/18 102.6 kg (226 lb 4.8 oz)   05/23/18 101.6 kg (224 lb)               Primary Care Provider Office Phone # Fax #    Chase Villafuerte [phone]       [address]  [facility] [address]        Equal Access to Services     YVETTE CLEARY : Hadii aad ku hadasho Soomaali, waaxda luqadaha, qaybta kaalmada adeegyada, tawanda wilsonin hayjenifern julianne dash. So [facility] [phone].    ATENCIÓN: Si habla español, tiene a velazquez disposición servicios gratuitos de asistencia lingüística. ame al [phone].    We comply with applicable federal civil rights laws and [place] laws. We do not discriminate on the basis of race, color, national origin, age, disability, sex, sexual orientation, or gender identity.            Thank you!     Thank you for choosing [facility]  for your care. Our goal is always to provide you with excellent care. Hearing back from our patients is one way we can continue to improve our services. Please take a few  minutes to complete the written survey that you may receive in the mail after your visit with us. Thank you!             Your Updated Medication List - Protect others around you: Learn how to safely use, store and throw away your medicines at www.disposemymeds.org.          This list is accurate as of 6/6/18  2:31 PM.  Always use your most recent med list.                   Brand Name Dispense Instructions for use Diagnosis    ferrous sulfate 325 (65 Fe) MG tablet    IRON    90 tablet    Take 1 tablet (325 mg) by mouth 3 times daily (with meals)    Iron deficiency anemia, unspecified iron deficiency anemia type       norethindrone-ethinyl estradiol-iron 1.5-30 MG-MCG per tablet    MICROGESTIN FE1.5/30    84 tablet    Take 1 tablet by mouth daily    Excessive or frequent menstruation

## 2018-06-06 NOTE — LETTER
"    6/6/2018         RE: Karen Orellana  [address] [facility] [address]        Dear Colleague,    Thank you for referring your patient, Karen Orellana, to the [facility]. Please see a copy of my visit note below.    Oncology Rooming Note    June 6, 2018 2:12 PM   Karen Orellana is a 27 year old female who presents for:    Chief Complaint   Patient presents with     Oncology Clinic Visit     Iron deficiency anemia due to chronic blood loss     Initial Vitals: /82 (BP Location: Right arm, Patient Position: Sitting, Cuff Size: Adult Large)  Pulse 74  Temp 98.8  F (37.1  C) (Oral)  Resp 16  Wt 102.4 kg (225 lb 12.8 oz)  SpO2 100%  BMI 36.45 kg/m2 Estimated body mass index is 36.45 kg/(m^2) as calculated from the following:    Height as of 5/24/18: 1.676 m (5' 6\").    Weight as of this encounter: 102.4 kg (225 lb 12.8 oz). Body surface area is 2.18 meters squared.  No Pain (0) Comment: Data Unavailable   No LMP recorded.  Allergies reviewed: Yes  Medications reviewed: Yes    Medications: Medication refills not needed today.  Pharmacy name entered into [employer]:    [id number] - [place], [place] - [address] [id number] - [place], [place] - [address] AT [place] [address]    Clinical concerns: None                 4 minutes for nursing intake (face to face time)     Mary Cruz MA              This clinic visit note has been dictated [id number]      [facility]YSIC[facility]  HEMATOLOGY ONCOLOGY    Visit Date:   06/06/2018      HEMATOLOGY FOLLOWUP NOTE       REASON FOR VISIT:  Iron deficiency anemia resulting from heavy menstrual bleeding.  The patient also has a vitamin B12 on the lower end of normal.      TREATMENT:  Status post IV Injectafer x 2 doses.      SUBJECTIVE:  The patient was seen as a followup today.  She continues to be fatigued with some improvement after IV iron.  She continues to pass " clots.  She continues to follow up with her gynecologist as well.  We reviewed the labs today and discussed further plan of care.     REVIEW OF SYSTEMS:  A complete review of systems was performed and found to be negative other than pertinent positives mentioned in history of present illness.      Past medical, social histories reviewed.     Meds- Reviewed.     PHYSICAL EXAMINATION:   VITAL SIGNS:  Her blood pressure is 121/82, pulse 84, respirations 16, temperature 98.8.   CONSTITUTIONAL:  Sitting comfortably.   NEUROLOGIC:  Alert, awake.   PSYCHIATRIC:  Mood and affect appear normal.      LABORATORY DATA AND IMAGING REVIEWED DURING THIS VISIT:  Recent Labs   Lab Test  06/01/18   0839  04/30/18   1558   NA  139  137   POTASSIUM  4.0  3.9   CHLORIDE  108  106   CO2  22  24   ANIONGAP  9  7   BUN  7  8   CR  0.57  0.68   GLC  87  79   LIBRA  8.9  8.7     Recent Labs   Lab Test  06/01/18   0839  04/30/18   1558  04/10/18   1555   WBC  8.7  9.5  13.1*   HGB  12.5  10.6*  10.7*   PLT  421  499*  449   MCV  84  80  79   NEUTROPHIL  56.8  47.2  62.3     Recent Labs   Lab Test  06/01/18   0839  04/30/18   1558  03/12/18   1653  08/06/16   1101   BILITOTAL  0.3  0.1*   --   0.5   ALKPHOS  59  67   --   87   ALT  31  23   --   26   AST  20  16   --   14   ALBUMIN  3.6  3.6   --   3.7   LDH   --    --   149   --         ASSESSMENT:  This is a 27-year-old lady who was initially seen by me on 04/30/2018 due to significant iron deficiency and which was not responsive to oral iron supplementation.  She had heavy menstrual bleeding and was following up with her gynecologist as well.      She was given 2 doses of IV iron, which she has tolerated well.      Labs were reviewed with the patient.  She has significant improvement in her iron studies.  Her ferritin is 596, a normal iron level and iron saturation level.  Her vitamin B12 level was at a lower end of normal at 297.  Her white cell count was 8.7.  Her hemoglobin is normal now,  which is 12.5.      I discussed with the patient that she should continue oral iron supplement.  Given her B12 is on the lower side, she should start oral B12 supplement as well.  She already has both of these supplements at home.      PLAN:   1.  Start taking oral iron and B12 oral supplement.   2.  Return to clinic in 3 months with labs before; CBC, diff, retic count, iron studies and B12 level         MICHELLE MACIEL MD             D: 2018   T: 2018   MT: ISSAC      Name:     LUPILLO JARAMILLO   MRN:      [id number]        Account:      [id number]   :      1990           Visit Date:   2018      Document: [id number]       cc: Chase Villafuerte MD       Again, thank you for allowing me to participate in the care of your patient.        Sincerely,        Michelle Maciel MD

## 2018-06-06 NOTE — PATIENT INSTRUCTIONS
1- Start taking oral iron once daily  2- Start B12 oral supplement daily  3- RTC MD 3 months with labs before- CBC diff, iron studies and retic count, B12   Patient will schedule labs at [place] or [place], scheduled follow up in [place] - Demetria ROQUE given to patient

## 2018-06-06 NOTE — PROGRESS NOTES
"Oncology Rooming Note    June 6, 2018 2:12 PM   Karen Orellana is a 27 year old female who presents for:    Chief Complaint   Patient presents with     Oncology Clinic Visit     Iron deficiency anemia due to chronic blood loss     Initial Vitals: /82 (BP Location: Right arm, Patient Position: Sitting, Cuff Size: Adult Large)  Pulse 74  Temp 98.8  F (37.1  C) (Oral)  Resp 16  Wt 102.4 kg (225 lb 12.8 oz)  SpO2 100%  BMI 36.45 kg/m2 Estimated body mass index is 36.45 kg/(m^2) as calculated from the following:    Height as of 5/24/18: 1.676 m (5' 6\").    Weight as of this encounter: 102.4 kg (225 lb 12.8 oz). Body surface area is 2.18 meters squared.  No Pain (0) Comment: Data Unavailable   No LMP recorded.  Allergies reviewed: Yes  Medications reviewed: Yes    Medications: Medication refills not needed today.  Pharmacy name entered into [facility]:    [id number] - [place], [place] - [address] [id number] - [place], [place] - [address] AT [place] [address]    Clinical concerns: None                 4 minutes for nursing intake (face to face time)     Mary Cruz MA            "

## 2018-06-06 NOTE — PROGRESS NOTES
[facility] PHYSICIANS  HEMATOLOGY ONCOLOGY    Visit Date:   06/06/2018      HEMATOLOGY FOLLOWUP NOTE       REASON FOR VISIT:  Iron deficiency anemia resulting from heavy menstrual bleeding.  The patient also has a vitamin B12 on the lower end of normal.      TREATMENT:  Status post IV Injectafer x 2 doses.      SUBJECTIVE:  The patient was seen as a followup today.  She continues to be fatigued with some improvement after IV iron.  She continues to pass clots.  She continues to follow up with her gynecologist as well.  We reviewed the labs today and discussed further plan of care.     REVIEW OF SYSTEMS:  A complete review of systems was performed and found to be negative other than pertinent positives mentioned in history of present illness.      Past medical, social histories reviewed.     Meds- Reviewed.     PHYSICAL EXAMINATION:   VITAL SIGNS:  Her blood pressure is 121/82, pulse 84, respirations 16, temperature 98.8.   CONSTITUTIONAL:  Sitting comfortably.   NEUROLOGIC:  Alert, awake.   PSYCHIATRIC:  Mood and affect appear normal.      LABORATORY DATA AND IMAGING REVIEWED DURING THIS VISIT:  Recent Labs   Lab Test  06/01/18   0839  04/30/18   1558   NA  139  137   POTASSIUM  4.0  3.9   CHLORIDE  108  106   CO2  22  24   ANIONGAP  9  7   BUN  7  8   CR  0.57  0.68   GLC  87  79   LIBRA  8.9  8.7     Recent Labs   Lab Test  06/01/18   0839  04/30/18   1558  04/10/18   1555   WBC  8.7  9.5  13.1*   HGB  12.5  10.6*  10.7*   PLT  421  499*  449   MCV  84  80  79   NEUTROPHIL  56.8  47.2  62.3     Recent Labs   Lab Test  06/01/18   0839  04/30/18   1558  03/12/18   1653  08/06/16   1101   BILITOTAL  0.3  0.1*   --   0.5   ALKPHOS  59  67   --   87   ALT  31  23   --   26   AST  20  16   --   14   ALBUMIN  3.6  3.6   --   3.7   LDH   --    --   149   --         ASSESSMENT:  This is a 27-year-old lady who was initially seen by me on 04/30/2018 due to significant iron deficiency and which was not responsive  to oral iron supplementation.  She had heavy menstrual bleeding and was following up with her gynecologist as well.      She was given 2 doses of IV iron, which she has tolerated well.      Labs were reviewed with the patient.  She has significant improvement in her iron studies.  Her ferritin is 596, a normal iron level and iron saturation level.  Her vitamin B12 level was at a lower end of normal at 297.  Her white cell count was 8.7.  Her hemoglobin is normal now, which is 12.5.      I discussed with the patient that she should continue oral iron supplement.  Given her B12 is on the lower side, she should start oral B12 supplement as well.  She already has both of these supplements at home.      PLAN:   1.  Start taking oral iron and B12 oral supplement.   2.  Return to clinic in 3 months with labs before; CBC, diff, retic count, iron studies and B12 level         OLEGARIO SHIPMAN MD             D: 2018   T: 2018   MT: ISSAC      Name:     LUPILLO JARAMILLO   MRN:      [id number]        Account:      [id number]   :      1990           Visit Date:   2018      Document: [id number]       cc: Chase Villafuerte MD

## 2018-10-16 ENCOUNTER — HOSPITAL ENCOUNTER (OUTPATIENT)
Facility: CLINIC | Age: 28
Setting detail: OBSERVATION
Discharge: HOME | End: 2018-10-20
Attending: EMERGENCY MEDICINE | Admitting: INTERNAL MEDICINE
Payer: COMMERCIAL

## 2018-10-16 DIAGNOSIS — L03.012 PERIONYCHIA OF FINGER, LEFT: ICD-10-CM

## 2018-10-16 DIAGNOSIS — M65.949 FLEXOR TENOSYNOVITIS OF FINGER: ICD-10-CM

## 2018-10-16 LAB
ANION GAP SERPL CALCULATED.3IONS-SCNC: 7 MMOL/L (ref 3–14)
BASOPHILS # BLD AUTO: 0.1 10E9/L (ref 0–0.2)
BASOPHILS NFR BLD AUTO: 0.5 %
BUN SERPL-MCNC: 9 MG/DL (ref 7–30)
CALCIUM SERPL-MCNC: 8.9 MG/DL (ref 8.5–10.1)
CHLORIDE SERPL-SCNC: 104 MMOL/L (ref 94–109)
CO2 SERPL-SCNC: 26 MMOL/L (ref 20–32)
CREAT SERPL-MCNC: 0.68 MG/DL (ref 0.52–1.04)
DIFFERENTIAL METHOD BLD: ABNORMAL
EOSINOPHIL # BLD AUTO: 0.4 10E9/L (ref 0–0.7)
EOSINOPHIL NFR BLD AUTO: 3.8 %
ERYTHROCYTE [DISTWIDTH] IN BLOOD BY AUTOMATED COUNT: 11.9 % (ref 10–15)
GFR SERPL CREATININE-BSD FRML MDRD: >90 ML/MIN/1.7M2
GLUCOSE SERPL-MCNC: 86 MG/DL (ref 70–99)
HCT VFR BLD AUTO: 39.7 % (ref 35–47)
HGB BLD-MCNC: 13.1 G/DL (ref 11.7–15.7)
IMM GRANULOCYTES # BLD: 0 10E9/L (ref 0–0.4)
IMM GRANULOCYTES NFR BLD: 0.3 %
LYMPHOCYTES # BLD AUTO: 4.5 10E9/L (ref 0.8–5.3)
LYMPHOCYTES NFR BLD AUTO: 38.5 %
MCH RBC QN AUTO: 29.5 PG (ref 26.5–33)
MCHC RBC AUTO-ENTMCNC: 33 G/DL (ref 31.5–36.5)
MCV RBC AUTO: 89 FL (ref 78–100)
MONOCYTES # BLD AUTO: 0.9 10E9/L (ref 0–1.3)
MONOCYTES NFR BLD AUTO: 7.8 %
NEUTROPHILS # BLD AUTO: 5.7 10E9/L (ref 1.6–8.3)
NEUTROPHILS NFR BLD AUTO: 49.1 %
NRBC # BLD AUTO: 0 10*3/UL
NRBC BLD AUTO-RTO: 0 /100
PLATELET # BLD AUTO: 436 10E9/L (ref 150–450)
POTASSIUM SERPL-SCNC: 3.4 MMOL/L (ref 3.4–5.3)
RBC # BLD AUTO: 4.44 10E12/L (ref 3.8–5.2)
SODIUM SERPL-SCNC: 137 MMOL/L (ref 133–144)
WBC # BLD AUTO: 11.6 10E9/L (ref 4–11)

## 2018-10-16 PROCEDURE — 96375 TX/PRO/DX INJ NEW DRUG ADDON: CPT

## 2018-10-16 PROCEDURE — 85025 COMPLETE CBC W/AUTO DIFF WBC: CPT | Performed by: EMERGENCY MEDICINE

## 2018-10-16 PROCEDURE — 99219 ZZC INITIAL OBSERVATION CARE,LEVL II: CPT | Performed by: INTERNAL MEDICINE

## 2018-10-16 PROCEDURE — 80048 BASIC METABOLIC PNL TOTAL CA: CPT | Performed by: EMERGENCY MEDICINE

## 2018-10-16 PROCEDURE — 10060 I&D ABSCESS SIMPLE/SINGLE: CPT

## 2018-10-16 PROCEDURE — 99285 EMERGENCY DEPT VISIT HI MDM: CPT | Mod: 25

## 2018-10-16 PROCEDURE — G0378 HOSPITAL OBSERVATION PER HR: HCPCS

## 2018-10-16 PROCEDURE — 96367 TX/PROPH/DG ADDL SEQ IV INF: CPT

## 2018-10-16 PROCEDURE — 96365 THER/PROPH/DIAG IV INF INIT: CPT

## 2018-10-16 PROCEDURE — 25000128 H RX IP 250 OP 636: Performed by: EMERGENCY MEDICINE

## 2018-10-16 PROCEDURE — 76882 US LMTD JT/FCL EVL NVASC XTR: CPT

## 2018-10-16 RX ORDER — ESZOPICLONE 1 MG/1
1 TABLET, FILM COATED ORAL
Status: CANCELLED | OUTPATIENT
Start: 2018-10-16

## 2018-10-16 RX ORDER — CEFTRIAXONE 2 G/1
2 INJECTION, POWDER, FOR SOLUTION INTRAMUSCULAR; INTRAVENOUS ONCE
Status: COMPLETED | OUTPATIENT
Start: 2018-10-16 | End: 2018-10-16

## 2018-10-16 RX ORDER — HYDROMORPHONE HYDROCHLORIDE 1 MG/ML
0.5 INJECTION, SOLUTION INTRAMUSCULAR; INTRAVENOUS; SUBCUTANEOUS ONCE
Status: COMPLETED | OUTPATIENT
Start: 2018-10-16 | End: 2018-10-16

## 2018-10-16 RX ORDER — LIDOCAINE HYDROCHLORIDE 20 MG/ML
INJECTION, SOLUTION INFILTRATION; PERINEURAL
Status: DISCONTINUED
Start: 2018-10-16 | End: 2018-10-17 | Stop reason: HOSPADM

## 2018-10-16 RX ADMIN — Medication 0.5 MG: at 21:59

## 2018-10-16 RX ADMIN — VANCOMYCIN HYDROCHLORIDE 1500 MG: 5 INJECTION, POWDER, LYOPHILIZED, FOR SOLUTION INTRAVENOUS at 23:25

## 2018-10-16 RX ADMIN — CEFTRIAXONE SODIUM 2 G: 2 INJECTION, POWDER, FOR SOLUTION INTRAMUSCULAR; INTRAVENOUS at 22:58

## 2018-10-16 ASSESSMENT — ENCOUNTER SYMPTOMS
WOUND: 1
MYALGIAS: 1
FEVER: 0
COLOR CHANGE: 1
ARTHRALGIAS: 1

## 2018-10-16 NOTE — IP AVS SNAPSHOT
MRN:[id number]                      After Visit Summary   10/16/2018    Karen Orellana    MRN: [id number]           Thank you!     Thank you for choosing [facility] for your care. Our goal is always to provide you with excellent care. Hearing back from our patients is one way we can continue to improve our services. Please take a few minutes to complete the written survey that you may receive in the mail after you visit. If you would like to speak to someone directly about your visit please contact Patient Relations at [phone]. Thank you!          Patient Information     Date Of Birth          1990        About your hospital stay     You were admitted on:  October 16, 2018 You last received care in the:  [facility]    You were discharged on:  October 20, 2018        Reason for your hospital stay       Left index finger tenosynovitis with paronychia with incision and drainage                  Who to Call     For medical emergencies, please call 911.  For non-urgent questions about your medical care, please call your primary care provider or clinic, [phone]  For questions related to your surgery, please call your surgery clinic        Attending Provider     Provider Specialty    Usman Jarvis MD Emergency Medicine    Whit Bustillo MD Internal Medicine       Primary Care Provider Office Phone # Fax #    Chase Villafuerte [phone]      After Care Instructions     Activity       Your activity upon discharge: activity as tolerated, keep incisions covered, clean, and dry  May shower with hand covered/sealed            Diet       Follow this diet upon discharge: Orders Placed This Encounter      Advance Diet as Tolerated: Regular Diet Adult            Wound care and dressings       Instructions to care for your wound at home: as directed, daily dressing changes and soak twice daily for 20 minutes with Hibiclens and water.          "         Follow-up Appointments     Follow-up and recommended labs and tests        Follow up with Dr. Tracy , at (location with clinic name or city) [facility], within 7-10 days  to evaluate after surgery. No follow up labs or test are needed.  Call Clara for appointment or with any questions or concerns [phone]                  Your next 10 appointments already scheduled     Oct 25, 2018 10:00 AM CDT   PHYSICAL with Chase Villafuerte MD   [facility]    [address]  [facility] [id number] [phone]              Pending Results     Date and Time Order Name Status Description    10/18/2018 0810 Anaerobic bacterial culture Preliminary     10/18/2018 0804 Anaerobic bacterial culture Preliminary             Statement of Approval     Ordered          10/20/18 5048  I have reviewed and agree with all the recommendations and orders detailed in this document.  EFFECTIVE NOW     Approved and electronically signed by:  Rich Scott MD             Admission Information     Date & Time Provider Department Dept. Phone    10/16/2018 Whit Bustillo MD [facility] [phone]      Your Vitals Were     Blood Pressure Pulse Temperature Respirations Height Weight    121/68 69 97.8  F (36.6  C) (Oral) 16 1.676 m (5' 6\") 105.2 kg (232 lb)    Pulse Oximetry BMI (Body Mass Index)                97% 37.45 kg/m2          MyChart Information     [employer] gives you secure access to your electronic health record. If you see a primary care provider, you can also send messages to your care team and make appointments. If you have questions, please call your primary care clinic.  If you do not have a primary care provider, please call [phone] and they will assist you.        Care EveryWhere ID     This is your Care EveryWhere ID. This could be used by other organizations to access your [place] medical records  [id number]      "   Equal Access to Services     [facility]: Hadii aad ku hadlesapauline Somoni, waaxda luqadaha, qaybta kaalmada maxine, tawanda dash. So [facility] [phone].    ATENCIÓN: Si habla andieañol, tiene a velazquez disposición servicios gratuitos de asistencia lingüística. Llame al [phone].    We comply with applicable federal civil rights laws and [place] laws. We do not discriminate on the basis of race, color, national origin, age, disability, sex, sexual orientation, or gender identity.               Review of your medicines      START taking        Dose / Directions    acetaminophen 325 MG tablet   Commonly known as:  TYLENOL   Used for:  Flexor tenosynovitis of finger        Dose:  650 mg   Take 2 tablets (650 mg) by mouth every 4 hours as needed for mild pain   Quantity:  40 tablet   Refills:  0       amoxicillin-clavulanate 500-125 MG per tablet   Commonly known as:  AUGMENTIN   Used for:  Flexor tenosynovitis of finger        Dose:  1 tablet   Take 1 tablet by mouth 3 times daily   Quantity:  30 tablet   Refills:  0       diphenhydrAMINE 25 MG capsule   Commonly known as:  BENADRYL   Used for:  Flexor tenosynovitis of finger        Dose:  25 mg   Take 1 capsule (25 mg) by mouth every 6 hours as needed for itching   Quantity:  10 capsule   Refills:  0       ibuprofen 200 MG tablet   Commonly known as:  ADVIL/MOTRIN   Used for:  Flexor tenosynovitis of finger        Dose:  600 mg   Take 3 tablets (600 mg) by mouth every 6 hours as needed for moderate pain   Quantity:  40 tablet   Refills:  0       ondansetron 4 MG ODT tab   Commonly known as:  ZOFRAN-ODT   Used for:  Flexor tenosynovitis of finger        Dose:  4 mg   Take 1 tablet (4 mg) by mouth every 6 hours as needed for nausea or vomiting   Quantity:  10 tablet   Refills:  0       oxyCODONE IR 5 MG tablet   Commonly known as:  ROXICODONE   Used for:  Flexor tenosynovitis of finger        Dose:  5 mg   Take 1 tablet (5 mg) by mouth  every 4 hours as needed for moderate to severe pain   Quantity:  15 tablet   Refills:  0       senna-docusate 8.6-50 MG per tablet   Commonly known as:  SENOKOT-S;PERICOLACE   Used for:  Flexor tenosynovitis of finger        Dose:  2 tablet   Take 2 tablets by mouth 2 times daily as needed for constipation   Quantity:  40 tablet   Refills:  0         CONTINUE these medicines which have NOT CHANGED        Dose / Directions    norethindrone-ethinyl estradiol-iron 1.5-30 MG-MCG per tablet   Commonly known as:  MICROGESTIN FE1.5/30   Used for:  Excessive or frequent menstruation        Dose:  1 tablet   Take 1 tablet by mouth daily   Quantity:  84 tablet   Refills:  3            Where to get your medicines      Some of these will need a paper prescription and others can be bought over the counter. Ask your nurse if you have questions.     Bring a paper prescription for each of these medications     acetaminophen 325 MG tablet    amoxicillin-clavulanate 500-125 MG per tablet    diphenhydrAMINE 25 MG capsule    ibuprofen 200 MG tablet    ondansetron 4 MG ODT tab    oxyCODONE IR 5 MG tablet    senna-docusate 8.6-50 MG per tablet                Protect others around you: Learn how to safely use, store and throw away your medicines at www.disposemymeds.org.        ANTIBIOTIC INSTRUCTION     You've Been Prescribed an Antibiotic - Now What?  Your healthcare team thinks that you or your loved one might have an infection. Some infections can be treated with antibiotics, which are powerful, life-saving drugs. Like all medications, antibiotics have side effects and should only be used when necessary. There are some important things you should know about your antibiotic treatment.      Your healthcare team may run tests before you start taking an antibiotic.    Your team may take samples (e.g., from your blood, urine or other areas) to run tests to look for bacteria. These test can be important to determine if you need an  antibiotic at all and, if you do, which antibiotic will work best.      Within a few days, your healthcare team might change or even stop your antibiotic.    Your team may start you on an antibiotic while they are working to find out what is making you sick.    Your team might change your antibiotic because test results show that a different antibiotic would be better to treat your infection.    In some cases, once your team has more information, they learn that you do not need an antibiotic at all. They may find out that you don't have an infection, or that the antibiotic you're taking won't work against your infection. For example, an infection caused by a virus can't be treated with antibiotics. Staying on an antibiotic when you don't need it is more likely to be harmful than helpful.      You may experience side effects from your antibiotic.    Like all medications, antibiotics have side effects. Some of these can be serious.    Let you healthcare team know if you have any known allergies when you are admitted to the hospital.    One significant side effect of nearly all antibiotics is the risk of severe and sometimes deadly diarrhea caused by Clostridium difficile (C. Difficile). This occurs when a person takes antibiotics because some good germs are destroyed. Antibiotic use allows C. diificile to take over, putting patients at high risk for this serious infection.    As a patient or caregiver, it is important to understand your or your loved one's antibiotic treatment. It is especially important for caregivers to speak up when patients can't speak for themselves. Here are some important questions to ask your healthcare team.    What infection is this antibiotic treating and how do you know I have that infection?    What side effects might occur from this antibiotic?    How long will I need to take this antibiotic?    Is it safe to take this antibiotic with other medications or supplements (e.g., vitamins)  that I am taking?     Are there any special directions I need to know about taking this antibiotic? For example, should I take it with food?    How will I be monitored to know whether my infection is responding to the antibiotic?    What tests may help to make sure the right antibiotic is prescribed for me?      Information provided by:  www.cdc.gov/getsmart  U.S. Department of Health and Human Services  Centers for disease Control and Prevention  National Center for Emerging and Zoonotic Infectious Diseases  Division of Healthcare Quality Promotion        Information about OPIOIDS     PRESCRIPTION OPIOIDS: WHAT YOU NEED TO KNOW   We gave you an opioid (narcotic) pain medicine. It is important to manage your pain, but opioids are not always the best choice. You should first try all the other options your care team gave you. Take this medicine for as short a time (and as few doses) as possible.    Some activities can increase your pain, such as bandage changes or therapy sessions. It may help to take your pain medicine 30 to 60 minutes before these activities. Reduce your stress by getting enough sleep, working on hobbies you enjoy and practicing relaxation or meditation. Talk to your care team about ways to manage your pain beyond prescription opioids.    These medicines have risks:    DO NOT drive when on new or higher doses of pain medicine. These medicines can affect your alertness and reaction times, and you could be arrested for driving under the influence (DUI). If you need to use opioids long-term, talk to your care team about driving.    DO NOT operate heavy machinery    DO NOT do any other dangerous activities while taking these medicines.    DO NOT drink any alcohol while taking these medicines.     If the opioid prescribed includes acetaminophen, DO NOT take with any other medicines that contain acetaminophen. Read all labels carefully. Look for the word  acetaminophen  or  Tylenol.  Ask your pharmacist  if you have questions or are unsure.    You can get addicted to pain medicines, especially if you have a history of addiction (chemical, alcohol or substance dependence). Talk to your care team about ways to reduce this risk.    All opioids tend to cause constipation. Drink plenty of water and eat foods that have a lot of fiber, such as fruits, vegetables, prune juice, apple juice and high-fiber cereal. Take a laxative (Miralax, milk of magnesia, Colace, Senna) if you don t move your bowels at least every other day. Other side effects include upset stomach, sleepiness, dizziness, throwing up, tolerance (needing more of the medicine to have the same effect), physical dependence and slowed breathing.    Store your pills in a secure place, locked if possible. We will not replace any lost or stolen medicine. If you don t finish your medicine, please throw away (dispose) as directed by your pharmacist. The [place] Pollution Control Agency has more information about safe disposal: https://www.pca.[facility].[place].us/living-green/managing-unwanted-medications             Medication List: This is a list of all your medications and when to take them. Check marks below indicate your daily home schedule. Keep this list as a reference.      Medications           Morning Afternoon Evening Bedtime As Needed    acetaminophen 325 MG tablet   Commonly known as:  TYLENOL   Take 2 tablets (650 mg) by mouth every 4 hours as needed for mild pain   Last time this was given:  650 mg on 10/18/2018 10:33 PM                                   amoxicillin-clavulanate 500-125 MG per tablet   Commonly known as:  AUGMENTIN   Take 1 tablet by mouth 3 times daily                                         diphenhydrAMINE 25 MG capsule   Commonly known as:  BENADRYL   Take 1 capsule (25 mg) by mouth every 6 hours as needed for itching   Last time this was given:  25 mg on 10/20/2018  6:24 AM                                   ibuprofen 200 MG tablet    Commonly known as:  ADVIL/MOTRIN   Take 3 tablets (600 mg) by mouth every 6 hours as needed for moderate pain   Last time this was given:  400 mg on 10/20/2018  7:54 AM                                   norethindrone-ethinyl estradiol-iron 1.5-30 MG-MCG per tablet   Commonly known as:  MICROGESTIN FE1.5/30   Take 1 tablet by mouth daily                                   ondansetron 4 MG ODT tab   Commonly known as:  ZOFRAN-ODT   Take 1 tablet (4 mg) by mouth every 6 hours as needed for nausea or vomiting                                   oxyCODONE IR 5 MG tablet   Commonly known as:  ROXICODONE   Take 1 tablet (5 mg) by mouth every 4 hours as needed for moderate to severe pain   Last time this was given:  5 mg on 10/20/2018 11:31 AM                                   senna-docusate 8.6-50 MG per tablet   Commonly known as:  SENOKOT-S;PERICOLACE   Take 2 tablets by mouth 2 times daily as needed for constipation   Last time this was given:  2 tablets on 10/19/2018  3:34 PM

## 2018-10-16 NOTE — IP AVS SNAPSHOT
[facility]    [address]    [facility] [address]    Phone:  [phone]    Fax:  [phone]                                       After Visit Summary   10/16/2018    Karen Orellana    MRN: [id number]           After Visit Summary Signature Page     I have received my discharge instructions, and my questions have been answered. I have discussed any challenges I see with this plan with the nurse or doctor.    ..........................................................................................................................................  Patient/Patient Representative Signature      ..........................................................................................................................................  Patient Representative Print Name and Relationship to Patient    ..................................................               ................................................  Date                                   Time    ..........................................................................................................................................  Reviewed by Signature/Title    ...................................................              ..............................................  Date                                               Time          22EPIC Rev 08/18

## 2018-10-17 ENCOUNTER — ANESTHESIA EVENT (OUTPATIENT)
Dept: SURGERY | Facility: CLINIC | Age: 28
End: 2018-10-17
Payer: COMMERCIAL

## 2018-10-17 ENCOUNTER — ANESTHESIA (OUTPATIENT)
Dept: SURGERY | Facility: CLINIC | Age: 28
End: 2018-10-17
Payer: COMMERCIAL

## 2018-10-17 PROBLEM — L03.012: Status: ACTIVE | Noted: 2018-10-17

## 2018-10-17 LAB
ERYTHROCYTE [DISTWIDTH] IN BLOOD BY AUTOMATED COUNT: 12.2 % (ref 10–15)
HCG UR QL: NEGATIVE
HCT VFR BLD AUTO: 36.5 % (ref 35–47)
HGB BLD-MCNC: 12.1 G/DL (ref 11.7–15.7)
MCH RBC QN AUTO: 29.6 PG (ref 26.5–33)
MCHC RBC AUTO-ENTMCNC: 33.2 G/DL (ref 31.5–36.5)
MCV RBC AUTO: 89 FL (ref 78–100)
PLATELET # BLD AUTO: 400 10E9/L (ref 150–450)
RBC # BLD AUTO: 4.09 10E12/L (ref 3.8–5.2)
WBC # BLD AUTO: 10.8 10E9/L (ref 4–11)

## 2018-10-17 PROCEDURE — 99207 ZZC CDG-MDM COMPONENT: MEETS LOW - DOWN CODED: CPT | Performed by: HOSPITALIST

## 2018-10-17 PROCEDURE — 99207 ZZC CDG-CODE CATEGORY CHANGED: CPT | Performed by: HOSPITALIST

## 2018-10-17 PROCEDURE — 85027 COMPLETE CBC AUTOMATED: CPT | Performed by: INTERNAL MEDICINE

## 2018-10-17 PROCEDURE — 25000132 ZZH RX MED GY IP 250 OP 250 PS 637: Performed by: INTERNAL MEDICINE

## 2018-10-17 PROCEDURE — 25000125 ZZHC RX 250: Performed by: HOSPITALIST

## 2018-10-17 PROCEDURE — 81025 URINE PREGNANCY TEST: CPT | Performed by: INTERNAL MEDICINE

## 2018-10-17 PROCEDURE — 99224 ZZC SUBSEQUENT OBSERVATION CARE,LEVEL I: CPT | Performed by: HOSPITALIST

## 2018-10-17 PROCEDURE — G0378 HOSPITAL OBSERVATION PER HR: HCPCS

## 2018-10-17 PROCEDURE — 36415 COLL VENOUS BLD VENIPUNCTURE: CPT | Performed by: INTERNAL MEDICINE

## 2018-10-17 PROCEDURE — 25000128 H RX IP 250 OP 636: Performed by: HOSPITALIST

## 2018-10-17 PROCEDURE — 25000128 H RX IP 250 OP 636: Performed by: INTERNAL MEDICINE

## 2018-10-17 RX ORDER — CLINDAMYCIN PHOSPHATE 900 MG/50ML
900 INJECTION, SOLUTION INTRAVENOUS EVERY 8 HOURS
Status: DISCONTINUED | OUTPATIENT
Start: 2018-10-17 | End: 2018-10-20 | Stop reason: HOSPADM

## 2018-10-17 RX ORDER — LIDOCAINE 40 MG/G
CREAM TOPICAL
Status: DISCONTINUED | OUTPATIENT
Start: 2018-10-17 | End: 2018-10-20 | Stop reason: HOSPADM

## 2018-10-17 RX ORDER — PROCHLORPERAZINE MALEATE 10 MG
10 TABLET ORAL EVERY 6 HOURS PRN
Status: DISCONTINUED | OUTPATIENT
Start: 2018-10-17 | End: 2018-10-20 | Stop reason: HOSPADM

## 2018-10-17 RX ORDER — NALOXONE HYDROCHLORIDE 0.4 MG/ML
.1-.4 INJECTION, SOLUTION INTRAMUSCULAR; INTRAVENOUS; SUBCUTANEOUS
Status: DISCONTINUED | OUTPATIENT
Start: 2018-10-17 | End: 2018-10-18

## 2018-10-17 RX ORDER — OXYCODONE HYDROCHLORIDE 5 MG/1
5 TABLET ORAL EVERY 4 HOURS PRN
Status: DISCONTINUED | OUTPATIENT
Start: 2018-10-17 | End: 2018-10-18

## 2018-10-17 RX ORDER — NORETHINDRONE ACETATE AND ETHINYL ESTRADIOL 1.5-30(21)
1 KIT ORAL DAILY
Status: DISCONTINUED | OUTPATIENT
Start: 2018-10-17 | End: 2018-10-20 | Stop reason: HOSPADM

## 2018-10-17 RX ORDER — AMOXICILLIN 250 MG
2 CAPSULE ORAL 2 TIMES DAILY PRN
Status: DISCONTINUED | OUTPATIENT
Start: 2018-10-17 | End: 2018-10-20 | Stop reason: HOSPADM

## 2018-10-17 RX ORDER — ONDANSETRON 4 MG/1
4 TABLET, ORALLY DISINTEGRATING ORAL EVERY 6 HOURS PRN
Status: DISCONTINUED | OUTPATIENT
Start: 2018-10-17 | End: 2018-10-20 | Stop reason: HOSPADM

## 2018-10-17 RX ORDER — AMOXICILLIN 250 MG
1 CAPSULE ORAL 2 TIMES DAILY PRN
Status: DISCONTINUED | OUTPATIENT
Start: 2018-10-17 | End: 2018-10-20 | Stop reason: HOSPADM

## 2018-10-17 RX ORDER — PROCHLORPERAZINE 25 MG
25 SUPPOSITORY, RECTAL RECTAL EVERY 12 HOURS PRN
Status: DISCONTINUED | OUTPATIENT
Start: 2018-10-17 | End: 2018-10-20 | Stop reason: HOSPADM

## 2018-10-17 RX ORDER — ONDANSETRON 2 MG/ML
4 INJECTION INTRAMUSCULAR; INTRAVENOUS EVERY 6 HOURS PRN
Status: DISCONTINUED | OUTPATIENT
Start: 2018-10-17 | End: 2018-10-20 | Stop reason: HOSPADM

## 2018-10-17 RX ORDER — ACETAMINOPHEN 325 MG/1
650 TABLET ORAL EVERY 4 HOURS PRN
Status: DISCONTINUED | OUTPATIENT
Start: 2018-10-17 | End: 2018-10-20 | Stop reason: HOSPADM

## 2018-10-17 RX ORDER — ACETAMINOPHEN 650 MG/1
650 SUPPOSITORY RECTAL EVERY 4 HOURS PRN
Status: DISCONTINUED | OUTPATIENT
Start: 2018-10-17 | End: 2018-10-20 | Stop reason: HOSPADM

## 2018-10-17 RX ORDER — CEFTRIAXONE 1 G/1
1 INJECTION, POWDER, FOR SOLUTION INTRAMUSCULAR; INTRAVENOUS EVERY 24 HOURS
Status: DISCONTINUED | OUTPATIENT
Start: 2018-10-17 | End: 2018-10-17

## 2018-10-17 RX ORDER — BISACODYL 10 MG
10 SUPPOSITORY, RECTAL RECTAL DAILY PRN
Status: DISCONTINUED | OUTPATIENT
Start: 2018-10-17 | End: 2018-10-20 | Stop reason: HOSPADM

## 2018-10-17 RX ADMIN — CLINDAMYCIN PHOSPHATE 900 MG: 900 INJECTION, SOLUTION INTRAVENOUS at 12:45

## 2018-10-17 RX ADMIN — CLINDAMYCIN PHOSPHATE 900 MG: 900 INJECTION, SOLUTION INTRAVENOUS at 20:03

## 2018-10-17 RX ADMIN — OXYCODONE HYDROCHLORIDE 5 MG: 5 TABLET ORAL at 01:26

## 2018-10-17 RX ADMIN — OXYCODONE HYDROCHLORIDE 5 MG: 5 TABLET ORAL at 20:03

## 2018-10-17 RX ADMIN — ACETAMINOPHEN 650 MG: 325 TABLET, FILM COATED ORAL at 20:03

## 2018-10-17 RX ADMIN — ACETAMINOPHEN 650 MG: 325 TABLET, FILM COATED ORAL at 12:47

## 2018-10-17 RX ADMIN — OXYCODONE HYDROCHLORIDE 5 MG: 5 TABLET ORAL at 07:40

## 2018-10-17 RX ADMIN — VANCOMYCIN HYDROCHLORIDE 1500 MG: 10 INJECTION, POWDER, LYOPHILIZED, FOR SOLUTION INTRAVENOUS at 09:59

## 2018-10-17 RX ADMIN — OXYCODONE HYDROCHLORIDE 5 MG: 5 TABLET ORAL at 11:52

## 2018-10-17 RX ADMIN — Medication 1 MG: at 21:33

## 2018-10-17 RX ADMIN — OXYCODONE HYDROCHLORIDE 5 MG: 5 TABLET ORAL at 15:40

## 2018-10-17 RX ADMIN — DEXTROSE AND SODIUM CHLORIDE: 5; 900 INJECTION, SOLUTION INTRAVENOUS at 08:15

## 2018-10-17 NOTE — ED TRIAGE NOTES
Pt had finger nails done 10 days ago, since has had increased swelling, redness, and pain to to L pointer finger. Pt sent from , attempted multiple numbing attempts per pt, pt still with full feeling in finger. UC had made a laceration to tip of finger, bleeding without pressure. ABCs intact.

## 2018-10-17 NOTE — PHARMACY-ADMISSION MEDICATION HISTORY
Admission medication history interview status for this patient is complete. See [facility] admission navigator for allergy information, prior to admission medications and immunization status.     Medication history interview source(s):Patient  Medication history resources (including written lists, pill bottles, clinic record):None    Changes made to PTA medication list:  Added: none  Deleted: Iron  Changed: none    Actions taken by pharmacist (provider contacted, etc):None     Additional medication history information:None    Medication reconciliation/reorder completed by provider prior to medication history? No    For patients on insulin therapy: no (Yes/No)   Lantus/levemir/NPH/Mix 70/30 dose: ___ in AM/PM or twice daily   Sliding scale Novolog Y/N   If Yes, do you have a baseline novolog pre-meal dose: ______units with meals   Patients eat three meals a day: Y/N ---  How many episodes of hypoglycemia (low blood glucose) do you have weekly: ---   How many missed doses do you have a week: ---  How many times do you check your blood glucose per day: ---  Any Barriers to therapy: cost of medications/comfortable with giving injections (if applicable)/ comfortable and confident with current diabetes regimen ---      Prior to Admission medications    Medication Sig Last Dose Taking? Auth Provider   norethindrone-ethinyl estradiol-iron (MICROGESTIN FE1.5/30) 1.5-30 MG-MCG per tablet Take 1 tablet by mouth daily 10/5/2018 Yes Jen Henry MD

## 2018-10-17 NOTE — PHARMACY-VANCOMYCIN DOSING SERVICE
Pharmacy Vancomycin Initial Note  Date of Service 2018  Patient's  1990  28 year old, female    Indication: Abscess    Current estimated CrCl = Estimated Creatinine Clearance: 151.7 mL/min (based on Cr of 0.68).    Creatinine for last 3 days  10/16/2018:  9:58 PM Creatinine 0.68 mg/dL    Recent Vancomycin Level(s) for last 3 days  No results found for requested labs within last 72 hours.      Vancomycin IV Administrations (past 72 hours)                   vancomycin (VANCOCIN) 1,500 mg in sodium chloride 0.9 % 250 mL intermittent infusion (mg) 1,500 mg New Bag 10/16/18 2325                Nephrotoxins and other renal medications (Future)    Start     Dose/Rate Route Frequency Ordered Stop    10/17/18 1000  vancomycin (VANCOCIN) 1,500 mg in sodium chloride 0.9 % 250 mL intermittent infusion      1,500 mg  over 90 Minutes Intravenous EVERY 12 HOURS 10/17/18 0133            Contrast Orders - past 72 hours     None                Plan:  1.  Start vancomycin  1500 mg IV q12h.   2.  Goal Trough Level: 10-15 mg/L   3.  Pharmacy will check trough levels as appropriate in 1-3 Days.    4. Serum creatinine levels will be ordered a minimum of twice weekly.    5. Chester method utilized to dose vancomycin therapy: Method 1    Usman Coffman

## 2018-10-17 NOTE — PROGRESS NOTES
Patient will be scheduled tomorrow AM for surgery   Discussed with Hafsa - nurse for 240    NPO after midnight  May eat and drink today  Continue IV abx    Clara Cristina PAC

## 2018-10-17 NOTE — PROGRESS NOTES
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  Orthopedics consulted and plan on taking patient to OR for I&D this afternoon.  Patient was on IV ceftriaxone and vancomycin narrowed down to IV clindamycin.         Assessment and Plan:      1.  Perionychia with possible felon and/or tendosynovitis-I&D attempted at bedside in the ER on admission without any purulence noted -orthopedic surgery was consulted the plan to take her to the OR for I&D today    --She was on IV ceftriaxone and vancomycin on admission, given no prior history of resistant infections or hospitalizations we will narrow it down to IV clindamycin   --Follow-up intraoperative wound culture when available     2.   Preop:  Overall healthy, no prior hx of surgery,  Does have anemia thought due to dysfunctional uterine bleeding.  But has no other bleeding hx (no epistaxis/gum bleeds etc).    Check urine pregnancy.  Okay for surgery     3.  Anxiety:  Appropriate situational anxiety-will have      PPX:  Ambulate  Code:  Full  Dispo: admit obs    # Pain Assessment:  Current Pain Score 10/17/2018   Patient currently in pain? -   Pain score (0-10) 4   Pain location -   Pain descriptors -   - Karen is experiencing pain due to infection. Pain management was discussed and the plan was created in a collaborative fashion.  Karen's response to the current recommendations: compliant  - Please see the plan for pain management as documented above               Interval History:      Complains of pain in the finger-unable to  "flex the finger  No fevers overnight  Denies any shortness of breath, diarrhea, abdominal pain    Patient has already been seen by orthopedic surgery and they plan to take her to or this afternoon for I&D      Data reviewed today: I reviewed all new labs and imaging reports over the last 24 hours. I personally reviewed no images or EKG's today.         Physical Exam:      Last Vital Signs:  /75  Temp 97.9  F (36.6  C)  Resp 16  Ht 1.676 m (5' 6\")  Wt 105.2 kg (232 lb)  SpO2 97%  BMI 37.45 kg/m2  GENERAL: No apparent distress. Awake, alert, and fully oriented.  HEENT: Normocephalic, atraumatic. Extraocular movements intact.  CARDIOVASCULAR: Regular rate and rhythm without murmurs or rubs. No S3.  PULMONARY: Clear bilaterally.  ABDOMINAL: Soft, non-tender, non-distended. Bowel sounds normoactive. No hepatosplenomegaly.  EXTREMITIES: No cyanosis or clubbing. No edema.  NEUROLOGICAL: CN 2-12 grossly intact, no focal neurological deficits.  DERMATOLOGICAL: No rash, ulcer, ecchymoses, jaundice.  PSYCH: appropriate           Medications:      All current medications were reviewed.         Data:      All new lab and imaging data was reviewed.   Data       Recent Labs  Lab 10/17/18  0541 10/16/18  2158   WBC 10.8 11.6*   HGB 12.1 13.1   HCT 36.5 39.7   MCV 89 89    436       Recent Labs  Lab 10/16/18  2158      POTASSIUM 3.4   CHLORIDE 104   CO2 26   ANIONGAP 7   GLC 86   BUN 9   CR 0.68   GFRESTIMATED >90   GFRESTBLACK >90   LIBRA 8.9       Recent Results (from the past 24 hour(s))   POC US SOFT TISSUE    Impression    PROCEDURE NOTE: ED BEDSIDE LIMITED ULTRASOUND  Name of the study: Soft Tissue  Performed by: Dr. Jarvis  Indications: Finger pain, redness    Body Location / Organs Imaged: L first digit  Findings: Focal hypoechoic fluid collection on dorsum distal phalanx extending into volar tuft.    Interpretation: Findings c/w likely paronychia and felon.   Archiving of images: Images were also " saved digitally to the internal hard drive of the ultrasound machine.           Althea Friend MD  Hospitalist  [facility] [address] [place] [address]

## 2018-10-17 NOTE — ED NOTES
[facility]  ED Nurse Handoff Report    Karen Orellana is a 28 year old female   ED Chief complaint: Hand Pain  . ED Diagnosis:   Final diagnoses:   Flexor tenosynovitis of finger     Allergies:   Allergies   Allergen Reactions     No Known Drug Allergies        Code Status: Prior  Activity level - Baseline/Home:  Independent. Activity Level - Current:   Independent. Lift room needed: No. Bariatric: No   Needed: No   Isolation: No. Infection: Not Applicable.     Vital Signs:   Vitals:    10/16/18 2115 10/16/18 2303   BP: (!) 162/112    Resp: 16    Temp: 98.1  F (36.7  C)    TempSrc: Oral    SpO2: 100%    Weight:  106.1 kg (234 lb)       Cardiac Rhythm:  ,      Pain level: 0-10 Pain Scale: 8  Patient confused: No. Patient Falls Risk: Yes.   Elimination Status: Has voided   Patient Report - Initial Complaint: Left 2nd finger pain. Focused Assessment: L 2nd finger edematous, erythematous.    Tests Performed: Labs,imaging. Abnormal Results:   Labs Ordered and Resulted from Time of ED Arrival Up to the Time of Departure from the ED   CBC WITH PLATELETS DIFFERENTIAL - Abnormal; Notable for the following:        Result Value    WBC 11.6 (*)     All other components within normal limits   BASIC METABOLIC PANEL     POC US SOFT TISSUE    (Results Pending)     .   Treatments provided: Abx, pain meds.  Family Comments: Mom at bedside.  OBS brochure/video discussed/provided to patient:  No  ED Medications:   Medications   lidocaine 1 % 5 mL (not administered)   lidocaine 2 % injection (not administered)   cefTRIAXone (ROCEPHIN) 2 g vial to attach to  ml bag for ADULTS or NS 50 ml bag for PEDS (2 g Intravenous New Bag 10/16/18 0569)   lidocaine/EPINEPHrine/tetracaine (LET) solution SOLN 3 mL (not administered)   vancomycin (VANCOCIN) 1,500 mg in sodium chloride 0.9 % 250 mL intermittent infusion (not administered)   HYDROmorphone (PF) (DILAUDID) injection 0.5 mg (0.5 mg Intravenous Given  10/16/18 8136)     Drips infusing:  No  For the majority of the shift, the patient's behavior Green. Interventions performed were NA.     Severe Sepsis OR Septic Shock Diagnosis Present: No      ED Nurse Name/Phone Number: Lake Cohn,   11:11 PM  RECEIVING UNIT ED HANDOFF REVIEW    Above ED Nurse Handoff Report was reviewed: Yes  Reviewed by: Raquel Elliott on October 17, 2018 at 12:14 AM

## 2018-10-17 NOTE — ED PROVIDER NOTES
History     Chief Complaint:  Hand Pain    HPI   Karen Orellana is a 28 year old female, who presents with her mother to the ED for the evaluation of hand pain. The patient reports that she was just sent from  where they attempted to numb her left pointer finger with multiple attempts.  also made a laceration to the tip of the finger, which is now bleeding. The patient notes that ten days ago she got her nails done at a salon and that the next day she started to experience left pointer finger pain and swelling. The patient also notes that over the next few days the finger became gradually more swollen and purple in color. The patient also reports that she has been performing hot and cold compressions to the finger, but that they did not help. The patient denies fever.    Allergies:  No known drug allergies     Medications:    Microgestin    Past Medical History:    Anemia    Past Surgical History:    History reviewed. No pertinent surgical history.    Family History:    Diabetes  Asthma  Hypertension  Hypoglycemia    Social History:  Smoking status: Never smoker  Alcohol use: Yes  Marital Status:  Single [1]     Review of Systems   Constitutional: Negative for fever.   Musculoskeletal: Positive for arthralgias and myalgias.   Skin: Positive for color change and wound.   All other systems reviewed and are negative.    Physical Exam   Patient Vitals for the past 24 hrs:   BP Temp Temp src Heart Rate Resp SpO2   10/16/18 2115 (!) 162/112 98.1  F (36.7  C) Oral 86 16 100 %       Physical Exam  Constitutional: Alert, attentive, GCS 15   MSK:  Left second digit is circumferentially swollen with ecchymosis to the distal phalanx extending laterally to just distal to the PIP and onto volar aspect, finger held in slight flexion, marked tenderness to palpation of volar mid phalanx, distal phalanx extending onto dorsum of distal finger just posterior to nail bed. Incision obliquely along lateral aspect of distal  phalanx.     Emergency Department Course   Imaging:  Radiographic findings were communicated with the patient and family who voiced understanding of the findings.    POC US SOFT TISSUE    Impression:     PROCEDURE NOTE: ED BEDSIDE LIMITED ULTRASOUND  Name of the study: Soft Tissue  Performed by: Dr. Jarvis  Indications: Finger pain, redness    Body Location / Organs Imaged: L first digit  Findings: Focal hypoechoic fluid collection on dorsum distal phalanx extending into volar tuft.    Interpretation: Findings c/w likely paronychia and felon.   Archiving of images: Images were also saved digitally to the internal hard drive of the ultrasound machine.              Ramesh Jarvis MD    Procedures:  PROCEDURE:  Incision and Drainage  LOCATIONS:  Left Pointer Finger  ANESTHESIA:  Topical block using 2 % lidocaine, 2 mLs  PREPARATION:  Cleansed with normal saline  PROCEDURE:  Area was incised with Straight 11 blade with single incision.  Wound treatment included bloody drainage.  Appropriate dressing was applied to cover the area. Patient tolerated the procedure well and there were no complications.    Procedure Note     Emergency Medicine Limited : Nerve Block - Median      PERFORMED BY: Ramesh Jarvis MD  INDICATIONS/SYMPTOM:  Paronychia and marilia  PROBE: Linear probe  DESCRIPTION OF PROCEDURE:  Left wrist is held in flexion against resistance and flexor carpi radialis tendon and longus tendon were palpated just proximal to the carpus.  Using sterile technique after cleaning with ChloraPrep x2, 27-gauge needle was inserted gently on the syringe until bony resistance was met.  2 cc of 2% lidocaine was injected as the needle was slowly withdrawn.  FINDINGS: Incomplete median nerve block with some anesthesia.    Incision + drainage  Date/Time: 10/17/2018 2:14 AM  Performed by: RAMESH JARVIS  Authorized by: RAMESH JARVIS     Consent:     Consent obtained:  Verbal    Consent given by:  Patient    Risks  discussed:  Bleeding, incomplete drainage, infection and pain    Alternatives discussed:  Delayed treatment  Location:     Indications for incision and drainage: paronychia.    Location:  Upper extremity    Upper extremity location:  Finger    Finger location:  L index finger  Procedure type:     Complexity:  Simple  Procedure details:     Incision types:  Single straight    Scalpel blade:  11    Drainage:  Bloody    Drainage amount:  Scant    Wound treatment:  Wound left open    Packing materials:  None  Post-procedure details:     Patient tolerance of procedure:  Tolerated well, no immediate complications                Laboratory:  CBC: WBC 11.6 (H), WNL (HGB 13.1, )  BMP: WNL (Creatinine 0.98)    Interventions:  : dilaudid, 0.5 mg, IV  2325, vancocin, 1,5000 mg, IV  2328, rocephin, 2 g, IV    Emergency Department Course:  Past medical records, nursing notes, and vitals reviewed.  : I performed an exam of the patient and obtained history, as documented above.    IV inserted and blood drawn.    The patient was sent for an ultra sound while in the emergency department, findings above.    : I spoke with Dr. Tracy of [facility].    : I rechecked the patient. Explained findings to patient.    : Discussed the patient with Dr. Bustillo, who will admit the patient to a med/surg bed for further monitoring, evaluation, and treatment. Findings and plan explained to the Patient who consents to admission.     224: I spoke to Dr. Bustillo of the hospitalist service who accepts the patient for admission.     Impression & Plan    Medical Decision Makin-year-old female with no significant past medical history significant for PCO S and dysfunctional uterine bleeding presenting for evaluation of now 10 days increasing left finger pain that began after having her nails done who arrives from urgent care where they attempted incision and drainage after multiple attempts at digital nerve  block.  Vital signs on arrival notable for markedly elevated blood pressure 160/110 otherwise within normal limits.  On exam patient has fusiform swelling of the left digit with erythema, induration extending from just proximal to the left first digit nailbed onto the volar aspect of the left finger and into the middle phalanx.  There was an attempt at an incision done in urgent care on the lateral aspect slightly oblique just proximal to the nail fold without evidence of purulent drainage.  Immediate concern is for likely paronychia extending into a felon but cannot entirely exclude early flexor tenosynovitis given the extensive swelling of the digit, slight flexion positioning and tenderness of both the distal and middle phalanx on the volar aspect.  She does however not have pain with passive extension of the finger.  Unclear how much swelling is related to multiple attempts at a nerve block done at outside institution versus progression of her infection. A bedside ultrasound was performed which does show a hypoechoic fluid collection just proximal to the nail fold that does appear to extend into the volar aspect of the first digit suggesting a paronychia now extending into a felon. I did discuss the case with on-call orthopedic surgeon, Dr. Tracy, who recommended IV antibiotics overnight and plan for surgical consultation in the morning or sooner if her condition should worsen.  An attempt to better gain source control, a median nerve block was performed as above with some improved anesthesia and a small single stab incision was made parallel to the nail bed with bloody drainage.  Patient was initiated on vancomycin and ceftriaxone and was admitted to the hospital for continued monitoring, IV antibiotics and orthopedic surgery evaluation.  She was given IV Dilaudid for pain control.  CBC notable for mildly elevated white count at 11.6 without left shift.  BMP within normal limits.    Diagnosis:     ICD-10-CM    1. Flexor tenosynovitis of finger M65.9    2. Paronychia  3. Felon     Disposition:  Admitted to hospital.    Usman Jarvis MD   [employer]  1:55 AM 10/17/18     Abilio Barakat  10/16/2018     [facility] EMERGENCY DEPARTMENT  Scribe Disclosure:  I, Abilio Barakat, am serving as a scribe at 9:38 PM on 10/16/2018 to document services personally performed by Usman Jarvis MD based on my observations and the provider's statements to me.        Usman Jarvis MD  10/17/18 0229

## 2018-10-17 NOTE — H&P
History and Physical     Karen Orellana MRN# [id number]   YOB: 1990 Age: 28 year old      Date of Admission:  10/16/2018    Primary care provider: Chaes Villafuerte          Assessment and Plan:   This patient is a 28 year old female w/PMH of PCOS, and DUB causing blood loss anemia that has required periodic transfusions who presents with Left hand 2nd finger nail perionychia with possible felon after having her nails done approximately 10 days ago    1.  Perionychia with possible felon and/or tendosynovitis- In discussion with Dr Jarvis who spoke with orthopedist Dr Tracy of [facility]-plan is to admit on IV Abx, he'll see in am and determine whether or not surgical I and D required. Dr Jarvis performed a POC US and does see a pocket of fluid concerning for abscess and so is going to try and put in a block and see if he can drain it    2.   Preop:  Overall healthy, no prior hx of surgery,  Does have anemia thought due to dysfunctional uterine bleeding.  But has no other bleeding hx (no epistaxis/gum bleeds etc).  Will check INR in am for completeness sakes.     3.  Anxiety:  Appropriate situational anxiety-will have     PPX:  Ambulate  Code:  Full  Dispo: admit obs              Chief Complaint:   Nail pain        History of Present Illness:     This patient is a 28 year old female w/PMH of PCOS, and DUB causing blood loss anemia that has required periodic transfusions who presents with Left hand 2nd finger nail pain and swelling.     She had her nails done about 10 days ago, she had the usual discomfort for the first 1-2 days that apparently would be expected.  But the discomfort persisted.  Initially it was not swollen or warm.  Perhaps a total of 4 or 5 days went by and it continued to hurt, and then started to become more swollen.  She noticed it feeling more tender and warm.  They tried ice, warm baths, vaseline and it continued to worsen and so she went to  for further evaluation.  There it  sounds like they felt she had perionychia and tried to place a nerve block and drain it but they were unsuccessful.  Matter of fact they ended up putting in so much numbing agent that they made the finger more swollen without satisfactorily numbing the nailbed.  They did attempt drainage but seeing as how she was not numb it was not successful.  She was then referred into our ER.     She denies any associate fevers/chills/sweats.  She has no malaise, n/v.  Her symptoms relate directly to the involved nail.  She states her whole finger is swollen now and it hurts to bend her fingers.     In ER:  She is AFeb and VSS (although initially quite hypertensive) Labs wnl except for slightly elevated WBC at 11.6.  Plans are for nerve block and hopeful drainage in ER, IV abx, and ortho eval to rule in or out tenosynovitis that may require more invasive I and D.     The history is obtained in discussion with the ER provider Dr Jarvis, the patient, and mother all with excellent reliability           Past Medical History:     Past Medical History:   Diagnosis Date     Anemia     2/2 DUB-has required 2-3 transfusions     PCOS (polycystic ovarian syndrome)              Past Surgical History:     Past Surgical History:   Procedure Laterality Date     NO HISTORY OF SURGERY               Social History:     Social History   Substance Use Topics     Smoking status: Never Smoker     Smokeless tobacco: Never Used     Alcohol use Yes      Comment: 1-2 times per week             Family History:     Family History   Problem Relation Age of Onset     Diabetes Father      Asthma Mother      Hypertension Mother      Hypoglycemia Mother      Diabetes Paternal Grandmother      Diabetes Paternal Grandfather      Diabetes Maternal Uncle      Diabetes Maternal Aunt      Hypertension Maternal Grandmother      Breast Cancer Maternal Grandmother             Allergies:     Allergies   Allergen Reactions     No Known Drug Allergies               Medications:   OCP  Iron tablets    Await formal med rec            Review of Systems:   A Comprehensive greater than 10 system review of systems was carried out.  Pertinent positives and negatives are noted above.  Otherwise negative for contributory information.           Physical Exam:   Blood pressure (!) 162/112, temperature 98.1  F (36.7  C), temperature source Oral, resp. rate 16, SpO2 100 %, not currently breastfeeding.  Exam:    General:  Pleasant nad looks stated age  HEENT:  Head nc/at sclera clear PERRL O/P:  Moist mucus membranes no posterior pharyngeal erythema or exudate.  Neck is supple  Lungs: cta b nl effort   CV:  RRR no m/r/g no le edema  Abd:  S/nt/nd no r/g  Neuro:  Cn 2-12 grossly intact and strength is intact in b ue and le  Alert and oriented affect appropriate   Skin:  W/d no c/c  Left hand 2nd digit - very tender and diffusely swollen/warm to touch               Data:          Lab Results   Component Value Date     10/16/2018    Lab Results   Component Value Date    CHLORIDE 104 10/16/2018    Lab Results   Component Value Date    BUN 9 10/16/2018      Lab Results   Component Value Date    POTASSIUM 3.4 10/16/2018    Lab Results   Component Value Date    CO2 26 10/16/2018    Lab Results   Component Value Date    CR 0.68 10/16/2018        Lab Results   Component Value Date    WBC 11.6 (H) 10/16/2018    HGB 13.1 10/16/2018    HCT 39.7 10/16/2018    MCV 89 10/16/2018     10/16/2018     Lab Results   Component Value Date    GLC 86 10/16/2018            Imaging:   No results found for this or any previous visit (from the past 24 hour(s)).

## 2018-10-17 NOTE — PLAN OF CARE
Problem: Patient Care Overview  Goal: Plan of Care/Patient Progress Review  Outcome: No Change  Afebrile. VSS. Pain in left index finger rated 6/10 controlled with Oxycodone. Left index finger red and swollen. Elevating. NPO meds/chips for possible surgery in AM. Voiding. Appeared to sleep comfortably between cares.

## 2018-10-17 NOTE — PLAN OF CARE
Problem: Patient Care Overview  Goal: Plan of Care/Patient Progress Review  Outcome: No Change    Vital signs: Stable; B/P: 129/75, Temp: 97.9, HR: 92, RR: 16  Pain Control: Oxycodone   Diet: NPO  Output: Voiding adequately.   Activity: Up in room independently.  Updates: Left index finger albertina and swollen. Small incision under fingernail from previous I&D. No drainage noted. Ortho consulting and planning for taking patient down for an I&D later today or tomorrow morning.   Plan: Continue IV antibiotics as ordered and monitor pain control.

## 2018-10-17 NOTE — CONSULTS
[facility] Orthopedic Consultation    Karen Orellana MRN# [id number]   Age: 28 year old YOB: 1990     Date of Admission:  10/16/2018    Reason for consult:  Left index finger paronychia       Requesting physician:  Whit Bustillo MD       Level of consult: Consult, follow and place orders           Assessment and Plan:   Assessment:   Left index finger medial nail bed paronychia  Left index finger felon  Questionable left index finger flexor tenosynovitis      Plan:   Discussed patient continued IV antibiotics and being observed versus surgery, at this time patient would rather have surgery ASAP  We will discuss this further with  on-call  N.p.o.  Continue IV antibiotics           Chief Complaint:   Left index finger paronychia         History of Present Illness:   This patient is a 28 year old female who presents with the following condition requiring a hospital admission:    Karen states she had a manicure done at a nail salon 10 days ago.  Since that time she has had a small area on her medial index finger nailbed which is become increasingly red, painful, swollen.  She was seen at an urgent care 2 days ago where an attempt to drain the area was trialed.  She was placed on oral antibiotics at that time.  Presented to ED last night where an additional bedside I&D was attempted.  Now with increasing pain, redness, swelling at the tip of the finger as well as down to the MCP joint.  Patient denies any fevers, chills, malaise, night sweats.  She is not immunocompromised.          Past Medical History:     Past Medical History:   Diagnosis Date     Anemia     2/2 DUB-has required 2-3 transfusions     PCOS (polycystic ovarian syndrome)              Past Surgical History:     Past Surgical History:   Procedure Laterality Date     NO HISTORY OF SURGERY               Social History:     Social History   Substance Use Topics     Smoking status: Never Smoker     Smokeless tobacco: Never Used      Alcohol use Yes      Comment: 1-2 times per week             Family History:     Family History   Problem Relation Age of Onset     Diabetes Father      Asthma Mother      Hypertension Mother      Hypoglycemia Mother      Diabetes Paternal Grandmother      Diabetes Paternal Grandfather      Diabetes Maternal Uncle      Diabetes Maternal Aunt      Hypertension Maternal Grandmother      Breast Cancer Maternal Grandmother              Immunizations:     VACCINE/DOSE   Diptheria   DPT   DTAP   HBIG   Hepatitis A   Hepatitis B   HIB   Influenza   Measles   Meningococcal   MMR   Mumps   Pneumococcal   Polio   Rubella   Small Pox   TDAP   Varicella   Zoster             Allergies:     Allergies   Allergen Reactions     No Known Drug Allergies              Medications:     Current Facility-Administered Medications   Medication     acetaminophen (TYLENOL) Suppository 650 mg     acetaminophen (TYLENOL) tablet 650 mg     bisacodyl (DULCOLAX) Suppository 10 mg     cefTRIAXone (ROCEPHIN) 1 g vial to attach to  mL bag for ADULTS or NS 50 mL bag for PEDS     dextrose 5% and 0.9% NaCl infusion     lidocaine (LMX4) cream     lidocaine 1 % 1 mL     lidocaine 1 % 5 mL     lidocaine/EPINEPHrine/tetracaine (LET) solution SOLN 3 mL     magnesium hydroxide (MILK OF MAGNESIA) suspension 30 mL     melatonin tablet 1 mg     naloxone (NARCAN) injection 0.1-0.4 mg     norethindrone-ethinyl estradiol-iron (MICROGESTIN FE1.5/30) 1.5-30 MG-MCG per tablet 1 tablet     ondansetron (ZOFRAN-ODT) ODT tab 4 mg    Or     ondansetron (ZOFRAN) injection 4 mg     oxyCODONE IR (ROXICODONE) tablet 5 mg     prochlorperazine (COMPAZINE) injection 10 mg    Or     prochlorperazine (COMPAZINE) tablet 10 mg    Or     prochlorperazine (COMPAZINE) Suppository 25 mg     senna-docusate (SENOKOT-S;PERICOLACE) 8.6-50 MG per tablet 1 tablet    Or     senna-docusate (SENOKOT-S;PERICOLACE) 8.6-50 MG per tablet 2 tablet     sodium chloride (PF) 0.9% PF flush 3 mL  "    sodium chloride (PF) 0.9% PF flush 3 mL     vancomycin (VANCOCIN) 1,500 mg in sodium chloride 0.9 % 250 mL intermittent infusion             Review of Systems:   CV: NEGATIVE for chest pain, palpitations or peripheral edema  C: NEGATIVE for fever, chills, change in weight  E/M: NEGATIVE for ear, mouth and throat problems  R: NEGATIVE for significant cough or SOB    10 point review of systems negative aside from HPI and physical exam.           Physical Exam:   All vitals have been reviewed  Patient Vitals for the past 24 hrs:   BP Temp Temp src Heart Rate Resp SpO2 Height Weight   10/17/18 0740 129/75 97.9  F (36.6  C) - 92 16 97 % - -   10/17/18 0116 133/75 97.9  F (36.6  C) Oral 80 16 100 % 1.676 m (5' 6\") 105.2 kg (232 lb)   10/17/18 0041 - - - - - 100 % - -   10/17/18 0040 - - - - - 100 % - -   10/17/18 0039 - - - - - 100 % - -   10/17/18 0038 - - - - - 100 % - -   10/17/18 0037 - - - - - 100 % - -   10/17/18 0036 - - - - - 100 % - -   10/17/18 0035 - - - - - 100 % - -   10/17/18 0034 - - - - - 100 % - -   10/17/18 0033 - - - - - 100 % - -   10/17/18 0032 - - - - - 100 % - -   10/16/18 2345 - - - - - 99 % - -   10/16/18 2330 - - - - - 99 % - -   10/16/18 2303 - - - - - - - 106.1 kg (234 lb)   10/16/18 2115 (!) 162/112 98.1  F (36.7  C) Oral 86 16 100 % - -     No intake or output data in the 24 hours ending 10/17/18 1037  Constitutional: Pleasant, alert, appropriate, following commands.  HEENT: Head atraumatic normocephalic. PERRLA.  Respiratory: Unlabored breathing no audible wheeze  Cardiovascular: Regular rate and rhythm  GI: Abdomen soft, non tender, and non distended.  Lymph/Hematologic: No edema or palor  Skin: No rashes, no cyanosis, no edema.  Neuro: Sensation intact to light touch in bilateral upper extremities.  Musculoskeletal:  Left index finger diffusely swollen  1 cm incision medial nailbed of the left index finger  No purulent drainage noted  Very tender to palpation at DIP and PIP  Very " limited range of motion of DIP and PIP due to pain and swelling  Refill present and brisk  No streaking noted to the hand or wrist          Data:   All laboratory data reviewed  Results for orders placed or performed during the hospital encounter of 10/16/18   Incision and drainage    Narrative    Ramesh Jarvis MD     10/17/2018  2:21 AM  Incision + drainage  Date/Time: 10/17/2018 2:14 AM  Performed by: RAMESH JARVIS  Authorized by: RAMESH JARVIS     Consent:     Consent obtained:  Verbal    Consent given by:  Patient    Risks discussed:  Bleeding, incomplete drainage, infection and pain    Alternatives discussed:  Delayed treatment  Location:     Indications for incision and drainage: paronychia.    Location:  Upper extremity    Upper extremity location:  Finger    Finger location:  L index finger  Procedure type:     Complexity:  Simple  Procedure details:     Incision types:  Single straight    Scalpel blade:  11    Drainage:  Bloody    Drainage amount:  Scant    Wound treatment:  Wound left open    Packing materials:  None  Post-procedure details:     Patient tolerance of procedure:  Tolerated well, no immediate   complications   POC US SOFT TISSUE    Impression    PROCEDURE NOTE: ED BEDSIDE LIMITED ULTRASOUND  Name of the study: Soft Tissue  Performed by: Dr. Jarvis  Indications: Finger pain, redness    Body Location / Organs Imaged: L first digit  Findings: Focal hypoechoic fluid collection on dorsum distal phalanx extending into volar tuft.    Interpretation: Findings c/w likely paronychia and felon.   Archiving of images: Images were also saved digitally to the internal hard drive of the ultrasound machine.       CBC + differential   Result Value Ref Range    WBC 11.6 (H) 4.0 - 11.0 10e9/L    RBC Count 4.44 3.8 - 5.2 10e12/L    Hemoglobin 13.1 11.7 - 15.7 g/dL    Hematocrit 39.7 35.0 - 47.0 %    MCV 89 78 - 100 fl    MCH 29.5 26.5 - 33.0 pg    MCHC 33.0 31.5 - 36.5 g/dL    RDW 11.9 10.0  - 15.0 %    Platelet Count 436 150 - 450 10e9/L    Diff Method Automated Method     % Neutrophils 49.1 %    % Lymphocytes 38.5 %    % Monocytes 7.8 %    % Eosinophils 3.8 %    % Basophils 0.5 %    % Immature Granulocytes 0.3 %    Nucleated RBCs 0 0 /100    Absolute Neutrophil 5.7 1.6 - 8.3 10e9/L    Absolute Lymphocytes 4.5 0.8 - 5.3 10e9/L    Absolute Monocytes 0.9 0.0 - 1.3 10e9/L    Absolute Eosinophils 0.4 0.0 - 0.7 10e9/L    Absolute Basophils 0.1 0.0 - 0.2 10e9/L    Abs Immature Granulocytes 0.0 0 - 0.4 10e9/L    Absolute Nucleated RBC 0.0    Basic metabolic panel (BMP)   Result Value Ref Range    Sodium 137 133 - 144 mmol/L    Potassium 3.4 3.4 - 5.3 mmol/L    Chloride 104 94 - 109 mmol/L    Carbon Dioxide 26 20 - 32 mmol/L    Anion Gap 7 3 - 14 mmol/L    Glucose 86 70 - 99 mg/dL    Urea Nitrogen 9 7 - 30 mg/dL    Creatinine 0.68 0.52 - 1.04 mg/dL    GFR Estimate >90 >60 mL/min/1.7m2    GFR Estimate If Black >90 >60 mL/min/1.7m2    Calcium 8.9 8.5 - 10.1 mg/dL   CBC with platelets   Result Value Ref Range    WBC 10.8 4.0 - 11.0 10e9/L    RBC Count 4.09 3.8 - 5.2 10e12/L    Hemoglobin 12.1 11.7 - 15.7 g/dL    Hematocrit 36.5 35.0 - 47.0 %    MCV 89 78 - 100 fl    MCH 29.6 26.5 - 33.0 pg    MCHC 33.2 31.5 - 36.5 g/dL    RDW 12.2 10.0 - 15.0 %    Platelet Count 400 150 - 450 10e9/L          Attestation:  I have reviewed today's vital signs, notes, medications, labs and imaging with Dr. Tracy.  Amount of time performed on this consult: 20 minutes.    Clara Cristina PA-C

## 2018-10-18 LAB
ANION GAP SERPL CALCULATED.3IONS-SCNC: 7 MMOL/L (ref 3–14)
BUN SERPL-MCNC: 10 MG/DL (ref 7–30)
CALCIUM SERPL-MCNC: 8.2 MG/DL (ref 8.5–10.1)
CHLORIDE SERPL-SCNC: 106 MMOL/L (ref 94–109)
CO2 SERPL-SCNC: 25 MMOL/L (ref 20–32)
CREAT SERPL-MCNC: 0.62 MG/DL (ref 0.52–1.04)
ERYTHROCYTE [DISTWIDTH] IN BLOOD BY AUTOMATED COUNT: 12.1 % (ref 10–15)
GFR SERPL CREATININE-BSD FRML MDRD: >90 ML/MIN/1.7M2
GLUCOSE SERPL-MCNC: 93 MG/DL (ref 70–99)
GRAM STN SPEC: NORMAL
HCT VFR BLD AUTO: 37.7 % (ref 35–47)
HGB BLD-MCNC: 12.4 G/DL (ref 11.7–15.7)
INR PPP: 1.02 (ref 0.86–1.14)
Lab: NORMAL
Lab: NORMAL
MCH RBC QN AUTO: 29.4 PG (ref 26.5–33)
MCHC RBC AUTO-ENTMCNC: 32.9 G/DL (ref 31.5–36.5)
MCV RBC AUTO: 89 FL (ref 78–100)
PLATELET # BLD AUTO: 392 10E9/L (ref 150–450)
POTASSIUM SERPL-SCNC: 4 MMOL/L (ref 3.4–5.3)
RBC # BLD AUTO: 4.22 10E12/L (ref 3.8–5.2)
SODIUM SERPL-SCNC: 138 MMOL/L (ref 133–144)
SPECIMEN SOURCE: NORMAL
SPECIMEN SOURCE: NORMAL
WBC # BLD AUTO: 9.4 10E9/L (ref 4–11)

## 2018-10-18 PROCEDURE — 25000125 ZZHC RX 250: Performed by: NURSE ANESTHETIST, CERTIFIED REGISTERED

## 2018-10-18 PROCEDURE — 25000128 H RX IP 250 OP 636: Performed by: HOSPITALIST

## 2018-10-18 PROCEDURE — 25000128 H RX IP 250 OP 636: Performed by: INTERNAL MEDICINE

## 2018-10-18 PROCEDURE — 27210794 ZZH OR GENERAL SUPPLY STERILE: Performed by: ORTHOPAEDIC SURGERY

## 2018-10-18 PROCEDURE — 71000012 ZZH RECOVERY PHASE 1 LEVEL 1 FIRST HR: Performed by: ORTHOPAEDIC SURGERY

## 2018-10-18 PROCEDURE — 36000058 ZZH SURGERY LEVEL 3 EA 15 ADDTL MIN: Performed by: ORTHOPAEDIC SURGERY

## 2018-10-18 PROCEDURE — 25000132 ZZH RX MED GY IP 250 OP 250 PS 637: Performed by: INTERNAL MEDICINE

## 2018-10-18 PROCEDURE — 87070 CULTURE OTHR SPECIMN AEROBIC: CPT | Performed by: ORTHOPAEDIC SURGERY

## 2018-10-18 PROCEDURE — 99207 ZZC CDG-CODE CATEGORY CHANGED: CPT | Performed by: INTERNAL MEDICINE

## 2018-10-18 PROCEDURE — 36000056 ZZH SURGERY LEVEL 3 1ST 30 MIN: Performed by: ORTHOPAEDIC SURGERY

## 2018-10-18 PROCEDURE — 87075 CULTR BACTERIA EXCEPT BLOOD: CPT | Performed by: ORTHOPAEDIC SURGERY

## 2018-10-18 PROCEDURE — 25000128 H RX IP 250 OP 636: Performed by: NURSE ANESTHETIST, CERTIFIED REGISTERED

## 2018-10-18 PROCEDURE — 71000013 ZZH RECOVERY PHASE 1 LEVEL 1 EA ADDTL HR: Performed by: ORTHOPAEDIC SURGERY

## 2018-10-18 PROCEDURE — 40000306 ZZH STATISTIC PRE PROC ASSESS II: Performed by: ORTHOPAEDIC SURGERY

## 2018-10-18 PROCEDURE — 37000009 ZZH ANESTHESIA TECHNICAL FEE, EACH ADDTL 15 MIN: Performed by: ORTHOPAEDIC SURGERY

## 2018-10-18 PROCEDURE — 85027 COMPLETE CBC AUTOMATED: CPT | Performed by: HOSPITALIST

## 2018-10-18 PROCEDURE — 25000132 ZZH RX MED GY IP 250 OP 250 PS 637: Performed by: ANESTHESIOLOGY

## 2018-10-18 PROCEDURE — 25000125 ZZHC RX 250: Performed by: ORTHOPAEDIC SURGERY

## 2018-10-18 PROCEDURE — 85610 PROTHROMBIN TIME: CPT | Performed by: HOSPITALIST

## 2018-10-18 PROCEDURE — 80048 BASIC METABOLIC PNL TOTAL CA: CPT | Performed by: HOSPITALIST

## 2018-10-18 PROCEDURE — 37000008 ZZH ANESTHESIA TECHNICAL FEE, 1ST 30 MIN: Performed by: ORTHOPAEDIC SURGERY

## 2018-10-18 PROCEDURE — 25000125 ZZHC RX 250: Performed by: HOSPITALIST

## 2018-10-18 PROCEDURE — 36415 COLL VENOUS BLD VENIPUNCTURE: CPT | Performed by: HOSPITALIST

## 2018-10-18 PROCEDURE — 25000566 ZZH SEVOFLURANE, EA 15 MIN: Performed by: ORTHOPAEDIC SURGERY

## 2018-10-18 PROCEDURE — 25000128 H RX IP 250 OP 636: Performed by: ANESTHESIOLOGY

## 2018-10-18 PROCEDURE — G0378 HOSPITAL OBSERVATION PER HR: HCPCS

## 2018-10-18 PROCEDURE — 87205 SMEAR GRAM STAIN: CPT | Performed by: ORTHOPAEDIC SURGERY

## 2018-10-18 PROCEDURE — 87077 CULTURE AEROBIC IDENTIFY: CPT | Performed by: ORTHOPAEDIC SURGERY

## 2018-10-18 PROCEDURE — 25000128 H RX IP 250 OP 636: Performed by: PHYSICIAN ASSISTANT

## 2018-10-18 PROCEDURE — 87076 CULTURE ANAEROBE IDENT EACH: CPT | Performed by: ORTHOPAEDIC SURGERY

## 2018-10-18 PROCEDURE — 99224 ZZC SUBSEQUENT OBSERVATION CARE,LEVEL I: CPT | Performed by: INTERNAL MEDICINE

## 2018-10-18 RX ORDER — DIPHENHYDRAMINE HCL 25 MG
25 CAPSULE ORAL EVERY 6 HOURS PRN
Status: DISCONTINUED | OUTPATIENT
Start: 2018-10-18 | End: 2018-10-20 | Stop reason: HOSPADM

## 2018-10-18 RX ORDER — ONDANSETRON 2 MG/ML
4 INJECTION INTRAMUSCULAR; INTRAVENOUS EVERY 30 MIN PRN
Status: DISCONTINUED | OUTPATIENT
Start: 2018-10-18 | End: 2018-10-18 | Stop reason: HOSPADM

## 2018-10-18 RX ORDER — HYDRALAZINE HYDROCHLORIDE 20 MG/ML
2.5-5 INJECTION INTRAMUSCULAR; INTRAVENOUS EVERY 10 MIN PRN
Status: DISCONTINUED | OUTPATIENT
Start: 2018-10-18 | End: 2018-10-18 | Stop reason: HOSPADM

## 2018-10-18 RX ORDER — GLYCOPYRROLATE 0.2 MG/ML
INJECTION, SOLUTION INTRAMUSCULAR; INTRAVENOUS PRN
Status: DISCONTINUED | OUTPATIENT
Start: 2018-10-18 | End: 2018-10-18

## 2018-10-18 RX ORDER — CEFAZOLIN SODIUM 1 G/50ML
1 INJECTION, SOLUTION INTRAVENOUS SEE ADMIN INSTRUCTIONS
Status: DISCONTINUED | OUTPATIENT
Start: 2018-10-18 | End: 2018-10-20 | Stop reason: HOSPADM

## 2018-10-18 RX ORDER — HYDROMORPHONE HYDROCHLORIDE 1 MG/ML
.3-.5 INJECTION, SOLUTION INTRAMUSCULAR; INTRAVENOUS; SUBCUTANEOUS EVERY 10 MIN PRN
Status: DISCONTINUED | OUTPATIENT
Start: 2018-10-18 | End: 2018-10-18 | Stop reason: HOSPADM

## 2018-10-18 RX ORDER — NALOXONE HYDROCHLORIDE 0.4 MG/ML
.1-.4 INJECTION, SOLUTION INTRAMUSCULAR; INTRAVENOUS; SUBCUTANEOUS
Status: DISCONTINUED | OUTPATIENT
Start: 2018-10-18 | End: 2018-10-20 | Stop reason: HOSPADM

## 2018-10-18 RX ORDER — ONDANSETRON 4 MG/1
4 TABLET, ORALLY DISINTEGRATING ORAL EVERY 30 MIN PRN
Status: DISCONTINUED | OUTPATIENT
Start: 2018-10-18 | End: 2018-10-18 | Stop reason: HOSPADM

## 2018-10-18 RX ORDER — ALBUTEROL SULFATE 0.83 MG/ML
2.5 SOLUTION RESPIRATORY (INHALATION) EVERY 4 HOURS PRN
Status: DISCONTINUED | OUTPATIENT
Start: 2018-10-18 | End: 2018-10-18 | Stop reason: HOSPADM

## 2018-10-18 RX ORDER — LIDOCAINE HYDROCHLORIDE 10 MG/ML
INJECTION, SOLUTION INFILTRATION; PERINEURAL PRN
Status: DISCONTINUED | OUTPATIENT
Start: 2018-10-18 | End: 2018-10-18

## 2018-10-18 RX ORDER — SODIUM CHLORIDE, SODIUM LACTATE, POTASSIUM CHLORIDE, CALCIUM CHLORIDE 600; 310; 30; 20 MG/100ML; MG/100ML; MG/100ML; MG/100ML
INJECTION, SOLUTION INTRAVENOUS CONTINUOUS PRN
Status: DISCONTINUED | OUTPATIENT
Start: 2018-10-18 | End: 2018-10-18

## 2018-10-18 RX ORDER — LIDOCAINE 40 MG/G
CREAM TOPICAL
Status: DISCONTINUED | OUTPATIENT
Start: 2018-10-18 | End: 2018-10-18

## 2018-10-18 RX ORDER — FENTANYL CITRATE 50 UG/ML
INJECTION, SOLUTION INTRAMUSCULAR; INTRAVENOUS PRN
Status: DISCONTINUED | OUTPATIENT
Start: 2018-10-18 | End: 2018-10-18

## 2018-10-18 RX ORDER — SODIUM CHLORIDE, SODIUM LACTATE, POTASSIUM CHLORIDE, CALCIUM CHLORIDE 600; 310; 30; 20 MG/100ML; MG/100ML; MG/100ML; MG/100ML
INJECTION, SOLUTION INTRAVENOUS CONTINUOUS
Status: DISCONTINUED | OUTPATIENT
Start: 2018-10-18 | End: 2018-10-18 | Stop reason: HOSPADM

## 2018-10-18 RX ORDER — FENTANYL CITRATE 50 UG/ML
25-50 INJECTION, SOLUTION INTRAMUSCULAR; INTRAVENOUS
Status: DISCONTINUED | OUTPATIENT
Start: 2018-10-18 | End: 2018-10-18 | Stop reason: HOSPADM

## 2018-10-18 RX ORDER — DEXAMETHASONE SODIUM PHOSPHATE 4 MG/ML
INJECTION, SOLUTION INTRA-ARTICULAR; INTRALESIONAL; INTRAMUSCULAR; INTRAVENOUS; SOFT TISSUE PRN
Status: DISCONTINUED | OUTPATIENT
Start: 2018-10-18 | End: 2018-10-18

## 2018-10-18 RX ORDER — MEPERIDINE HYDROCHLORIDE 50 MG/ML
12.5 INJECTION INTRAMUSCULAR; INTRAVENOUS; SUBCUTANEOUS
Status: DISCONTINUED | OUTPATIENT
Start: 2018-10-18 | End: 2018-10-18 | Stop reason: HOSPADM

## 2018-10-18 RX ORDER — FENTANYL CITRATE 50 UG/ML
25-50 INJECTION, SOLUTION INTRAMUSCULAR; INTRAVENOUS EVERY 5 MIN PRN
Status: DISCONTINUED | OUTPATIENT
Start: 2018-10-18 | End: 2018-10-18 | Stop reason: HOSPADM

## 2018-10-18 RX ORDER — MAGNESIUM HYDROXIDE 1200 MG/15ML
LIQUID ORAL PRN
Status: DISCONTINUED | OUTPATIENT
Start: 2018-10-18 | End: 2018-10-18 | Stop reason: HOSPADM

## 2018-10-18 RX ORDER — NALOXONE HYDROCHLORIDE 0.4 MG/ML
.1-.4 INJECTION, SOLUTION INTRAMUSCULAR; INTRAVENOUS; SUBCUTANEOUS
Status: DISCONTINUED | OUTPATIENT
Start: 2018-10-18 | End: 2018-10-18 | Stop reason: HOSPADM

## 2018-10-18 RX ORDER — CEFAZOLIN SODIUM 1 G/3ML
INJECTION, POWDER, FOR SOLUTION INTRAMUSCULAR; INTRAVENOUS PRN
Status: DISCONTINUED | OUTPATIENT
Start: 2018-10-18 | End: 2018-10-18

## 2018-10-18 RX ORDER — METOPROLOL TARTRATE 1 MG/ML
1-2 INJECTION, SOLUTION INTRAVENOUS EVERY 5 MIN PRN
Status: DISCONTINUED | OUTPATIENT
Start: 2018-10-18 | End: 2018-10-18 | Stop reason: HOSPADM

## 2018-10-18 RX ORDER — PROPOFOL 10 MG/ML
INJECTION, EMULSION INTRAVENOUS PRN
Status: DISCONTINUED | OUTPATIENT
Start: 2018-10-18 | End: 2018-10-18

## 2018-10-18 RX ORDER — SENNOSIDES 8.6 MG
1-2 TABLET ORAL 2 TIMES DAILY PRN
Status: DISCONTINUED | OUTPATIENT
Start: 2018-10-18 | End: 2018-10-18

## 2018-10-18 RX ORDER — KETOROLAC TROMETHAMINE 30 MG/ML
30 INJECTION, SOLUTION INTRAMUSCULAR; INTRAVENOUS EVERY 6 HOURS PRN
Status: DISCONTINUED | OUTPATIENT
Start: 2018-10-18 | End: 2018-10-18 | Stop reason: HOSPADM

## 2018-10-18 RX ORDER — CEFAZOLIN SODIUM 2 G/100ML
2 INJECTION, SOLUTION INTRAVENOUS
Status: DISCONTINUED | OUTPATIENT
Start: 2018-10-18 | End: 2018-10-18

## 2018-10-18 RX ORDER — IBUPROFEN 200 MG
200-400 TABLET ORAL EVERY 6 HOURS PRN
Status: DISCONTINUED | OUTPATIENT
Start: 2018-10-18 | End: 2018-10-20 | Stop reason: HOSPADM

## 2018-10-18 RX ORDER — OXYCODONE HYDROCHLORIDE 5 MG/1
5 TABLET ORAL EVERY 4 HOURS PRN
Status: DISCONTINUED | OUTPATIENT
Start: 2018-10-18 | End: 2018-10-20 | Stop reason: HOSPADM

## 2018-10-18 RX ADMIN — GLYCOPYRROLATE 0.2 MG: 0.2 INJECTION, SOLUTION INTRAMUSCULAR; INTRAVENOUS at 07:44

## 2018-10-18 RX ADMIN — FENTANYL CITRATE 50 MCG: 50 INJECTION, SOLUTION INTRAMUSCULAR; INTRAVENOUS at 08:19

## 2018-10-18 RX ADMIN — Medication 0.5 MG: at 08:41

## 2018-10-18 RX ADMIN — DIPHENHYDRAMINE HYDROCHLORIDE 25 MG: 25 CAPSULE ORAL at 11:37

## 2018-10-18 RX ADMIN — PROPOFOL 200 MG: 10 INJECTION, EMULSION INTRAVENOUS at 07:44

## 2018-10-18 RX ADMIN — CLINDAMYCIN PHOSPHATE 900 MG: 900 INJECTION, SOLUTION INTRAVENOUS at 04:29

## 2018-10-18 RX ADMIN — IBUPROFEN 400 MG: 200 TABLET, FILM COATED ORAL at 18:36

## 2018-10-18 RX ADMIN — FENTANYL CITRATE 50 MCG: 50 INJECTION INTRAMUSCULAR; INTRAVENOUS at 09:13

## 2018-10-18 RX ADMIN — CLINDAMYCIN PHOSPHATE 900 MG: 900 INJECTION, SOLUTION INTRAVENOUS at 22:35

## 2018-10-18 RX ADMIN — FENTANYL CITRATE 50 MCG: 50 INJECTION, SOLUTION INTRAMUSCULAR; INTRAVENOUS at 08:21

## 2018-10-18 RX ADMIN — ONDANSETRON 4 MG: 2 INJECTION INTRAMUSCULAR; INTRAVENOUS at 07:44

## 2018-10-18 RX ADMIN — DEXAMETHASONE SODIUM PHOSPHATE 8 MG: 4 INJECTION, SOLUTION INTRA-ARTICULAR; INTRALESIONAL; INTRAMUSCULAR; INTRAVENOUS; SOFT TISSUE at 07:44

## 2018-10-18 RX ADMIN — ACETAMINOPHEN 650 MG: 325 TABLET, FILM COATED ORAL at 22:33

## 2018-10-18 RX ADMIN — SODIUM CHLORIDE, POTASSIUM CHLORIDE, SODIUM LACTATE AND CALCIUM CHLORIDE: 600; 310; 30; 20 INJECTION, SOLUTION INTRAVENOUS at 07:07

## 2018-10-18 RX ADMIN — OXYCODONE HYDROCHLORIDE 5 MG: 5 TABLET ORAL at 15:36

## 2018-10-18 RX ADMIN — OXYCODONE HYDROCHLORIDE 5 MG: 5 TABLET ORAL at 10:09

## 2018-10-18 RX ADMIN — OXYCODONE HYDROCHLORIDE 5 MG: 5 TABLET ORAL at 18:36

## 2018-10-18 RX ADMIN — CLINDAMYCIN PHOSPHATE 900 MG: 900 INJECTION, SOLUTION INTRAVENOUS at 14:46

## 2018-10-18 RX ADMIN — CEFAZOLIN 2 G: 1 INJECTION, POWDER, FOR SOLUTION INTRAMUSCULAR; INTRAVENOUS at 07:49

## 2018-10-18 RX ADMIN — IBUPROFEN 400 MG: 200 TABLET, FILM COATED ORAL at 11:37

## 2018-10-18 RX ADMIN — DIPHENHYDRAMINE HYDROCHLORIDE 25 MG: 25 CAPSULE ORAL at 22:34

## 2018-10-18 RX ADMIN — FENTANYL CITRATE 100 MCG: 50 INJECTION, SOLUTION INTRAMUSCULAR; INTRAVENOUS at 07:40

## 2018-10-18 RX ADMIN — OXYCODONE HYDROCHLORIDE 5 MG: 5 TABLET ORAL at 22:34

## 2018-10-18 RX ADMIN — ACETAMINOPHEN 650 MG: 325 TABLET, FILM COATED ORAL at 10:09

## 2018-10-18 RX ADMIN — CEFAZOLIN SODIUM 1 G: 1 INJECTION, SOLUTION INTRAVENOUS at 13:01

## 2018-10-18 RX ADMIN — DEXTROSE AND SODIUM CHLORIDE: 5; 900 INJECTION, SOLUTION INTRAVENOUS at 10:11

## 2018-10-18 RX ADMIN — Medication 1 MG: at 22:34

## 2018-10-18 RX ADMIN — OXYCODONE HYDROCHLORIDE 5 MG: 5 TABLET ORAL at 04:35

## 2018-10-18 RX ADMIN — MIDAZOLAM 2 MG: 1 INJECTION INTRAMUSCULAR; INTRAVENOUS at 07:33

## 2018-10-18 RX ADMIN — SENNOSIDES AND DOCUSATE SODIUM 1 TABLET: 8.6; 5 TABLET ORAL at 18:37

## 2018-10-18 RX ADMIN — FENTANYL CITRATE 50 MCG: 50 INJECTION INTRAMUSCULAR; INTRAVENOUS at 09:06

## 2018-10-18 RX ADMIN — LIDOCAINE HYDROCHLORIDE 50 MG: 10 INJECTION, SOLUTION INFILTRATION; PERINEURAL at 07:44

## 2018-10-18 NOTE — ANESTHESIA CARE TRANSFER NOTE
Patient: Karen Orellana    Procedure(s):  COMBINED IRRIGATION AND DEBRIDEMENT FINGER LEFT    Diagnosis: unknown  Diagnosis Additional Information: No value filed.    Anesthesia Type:   General, LMA     Note:  Airway :Face Mask  Patient transferred to:PACU  Comments: Pt spon resp, follows commands, LMA removed without complications. Pt tx to PACU, VSS, pt comfortable. Report given to  PACU RN.Handoff Report: Identifed the Patient, Identified the Reponsible Provider, Reviewed the pertinent medical history, Discussed the surgical course, Reviewed Intra-OP anesthesia mangement and issues during anesthesia, Set expectations for post-procedure period and Allowed opportunity for questions and acknowledgement of understanding      Vitals: (Last set prior to Anesthesia Care Transfer)    CRNA VITALS  10/18/2018 0754 - 10/18/2018 0828      10/18/2018             Pulse: 113    SpO2: 100 %    Resp Rate (observed): (!)  4                Electronically Signed By: MARCELA Thomas CRNA  October 18, 2018  8:28 AM

## 2018-10-18 NOTE — ANESTHESIA POSTPROCEDURE EVALUATION
Patient: Karen Orellana    Procedure(s):  COMBINED IRRIGATION AND DEBRIDEMENT FINGER LEFT    Diagnosis:unknown  Diagnosis Additional Information: 1.  Left finger paronychia.     2.  Left index finger flexor tenosynovitis.     Anesthesia Type:  General, LMA    Note:  Anesthesia Post Evaluation    Patient location during evaluation: PACU  Patient participation: Able to fully participate in evaluation  Level of consciousness: awake  Pain management: adequate  Airway patency: patent  Cardiovascular status: acceptable  Respiratory status: acceptable  Hydration status: acceptable  PONV: controlled     Anesthetic complications: None          Last vitals:  Vitals:    10/18/18 0906 10/18/18 1005 10/18/18 1037   BP:  114/69 114/76   Resp: 19 18 22   Temp:  97.5  F (36.4  C) 98.1  F (36.7  C)   SpO2: 100% 98% 94%         Electronically Signed By: Drake Mccollum MD  October 18, 2018  11:21 AM

## 2018-10-18 NOTE — ANESTHESIA PREPROCEDURE EVALUATION
PAC NOTE:       ANESTHESIA PRE EVALUATION:  Anesthesia Evaluation     . Pt has not had prior anesthetic            ROS/MED HX    ENT/Pulmonary:  - neg pulmonary ROS     Neurologic:       Cardiovascular:  - neg cardiovascular ROS       METS/Exercise Tolerance:     Hematologic:         Musculoskeletal:         GI/Hepatic:         Renal/Genitourinary:     (+) Other Renal/ Genitourinary, dysfunctional uterine bleeding      Endo:     (+) Obesity, Other Endocrine Disorder PCOS.      Psychiatric:         Infectious Disease:   (+) Other Infectious Disease fingernail perionychia      Malignancy:         Other:                     Physical Exam      Airway   Mallampati: II  TM distance: >3 FB  Neck ROM: full    Dental     Cardiovascular   Rhythm and rate: regular and normal      Pulmonary    breath sounds clear to auscultation             Anesthesia Plan      History & Physical Review  History and physical reviewed and following examination; no interval change.    ASA Status:  2 .    NPO Status:  > 8 hours    Plan for General and LMA with Intravenous and Propofol induction. Maintenance will be Balanced.    PONV prophylaxis:  Ondansetron (or other 5HT-3)       Postoperative Care  Postoperative pain management:  IV analgesics, Multi-modal analgesia and Oral pain medications.      Consents  Anesthetic plan, risks, benefits and alternatives discussed with:  Patient..                            .

## 2018-10-18 NOTE — PLAN OF CARE
Problem: Patient Care Overview  Goal: Plan of Care/Patient Progress Review  Outcome: No Change  Pt's finger continues to be swollen and red in color, po analgesics and elevation utilized for pain control, no c/o nausea, continue to monitor and provide for needs.

## 2018-10-18 NOTE — OP NOTE
Procedure Date: 10/18/2018      PREOPERATIVE DIAGNOSES:     1.  Left finger paronychia.     2.  Left index finger flexor tenosynovitis.      POSTOPERATIVE DIAGNOSES:   1.  Left finger paronychia.     2.  Left index finger flexor tenosynovitis.        PROCEDURES PERFORMED:   1.  Sharp incision and drainage, left index finger paronychia.   2.  Left index finger incision and drainage flexor tendon sheath.      SURGEON:  Marco Tracy MD          ANESTHETIC : General.      ESTIMATED BLOOD LOSS:  10 mL      FINDINGS:  Cloudy fluid within the flexor tendon sheath and gross pus when paronychia decompressed both sent for cultures.      INDICATIONS:  A 28-year-old female who had her nails done some days ago and developed pain, redness, and swelling in the finger.  She had attempted drainage at an outside facility, with failure to improve.  She has not improved significantly on IV antibiotics.  Risks, benefits, and alternatives explained.  She elected to proceed with the above.  Risks of surgery in this condition discussed including but not limited to bleeding, infection, damage to surrounding neurovascular structures and tendons, finger stiffness, need for additional surgery, and anesthetic complications.  She wished to proceed.      DESCRIPTION OF PROCEDURE:  The patient identified in the preoperative area per hospital policy and correct operative site prior to the OR.  Antibiotics held for cultures.  General anesthesia induced. ChloraPrep standard prep and drape.  A timeout was performed.  All in the room agreed.      Opened the flexor tendon sheath by making a longitudinal incision over the A1 pulley, small transverse incisions in the volar aspect of the PIP and DIP joint.  Protected surrounding neurovascular structures and opened up the A1 pulley, cloudy fluid present, cultured.  We used a Joseph tendon passer to pass a fenstrated pediatric feeding tube along the flexor tendon sheath.  Copiously irrigated with  normal saline, approximately 1 liter.  Then, addressed paronychia using a scalpel blade along the nail fold.  Totally decompressed, purulence found, cultured.  Copiously irrigated.  Sterile dressings applied.  Awake and stable to PACU.  Single suture placed in a longitudinal incision over the A1 pulley, closed loosely.  Others left open.      POSTOPERATIVE PLAN:     1.  Hibiclens soaks b.i.d.     2.  Continue IV antibiotics and follow cultures.   3.  Possibly home tomorrow on oral antibiotics x10 days.   4.  Follow up with Dr. Quinteros's clinic in 10-14 days for removal of suture, sooner if there are issues.         ASHLEY QUINTEROS MD             D: 10/18/2018   T: 10/18/2018   MT: [id number]      Name:     LUPILLO JARAMILLO   MRN:      [id number]        Account:        [id number]   :      1990           Procedure Date: 10/18/2018      Document: [id number]

## 2018-10-18 NOTE — PLAN OF CARE
"Problem: Patient Care Overview  Goal: Plan of Care/Patient Progress Review  Outcome: No Change  Afebrile. VSS. Pain in left index finger managed with oxycodone. Elevated left hand on pillows. Receiving Cleocin. Voiding. Has not had BM \"in a few days\", discussed stool softener post surgery. NPO for surg this am.       "

## 2018-10-18 NOTE — PLAN OF CARE
Problem: Patient Care Overview  Goal: Plan of Care/Patient Progress Review  OT: Order received, chart reviewed. Per chart review and discussion with RN, pt had I&D procedure this AM, ace wrap in place. No specific activity/exercise orders indicated per ortho. Eval not indicated this date, will continue to follow to address OT needs if warranted.

## 2018-10-18 NOTE — BRIEF OP NOTE
[facility] Brief Operative Note    Pre-operative diagnosis: Unknown Left index finger flexor tenosynovitis   Post-operative diagnosis * No post-op diagnosis entered *  same   Procedure: Procedure(s):  COMBINED IRRIGATION AND DEBRIDEMENT FINGER LEFT   Surgeon(s): Surgeon(s) and Role:     * Marco Tracy MD - Primary   Estimated blood loss: * No values recorded between 10/18/2018  7:56 AM and 10/18/2018  8:23 AM *    Specimens:   ID Type Source Tests Collected by Time Destination   1 : Left Index Finger Wound Culture Wound Finger ANAEROBIC BACTERIAL CULTURE, GRAM STAIN, WOUND CULTURE AEROBIC BACTERIAL Marco Tracy MD 10/18/2018  8:02 AM    2 : Left Index Finger Wound Culture #2 Wound Finger ANAEROBIC BACTERIAL CULTURE, GRAM STAIN, WOUND CULTURE AEROBIC BACTERIAL Marco Tracy MD 10/18/2018  8:10 AM       Findings: Pus nail fold.  Cloudy fluid flexor tendon sheath    Plan:  Cont IV abx  Follow cultures  BID hibiclens soaks  discontinue - Friday? On oral abx

## 2018-10-19 LAB — HGB BLD-MCNC: 11 G/DL (ref 11.7–15.7)

## 2018-10-19 PROCEDURE — 99224 ZZC SUBSEQUENT OBSERVATION CARE,LEVEL I: CPT | Performed by: INTERNAL MEDICINE

## 2018-10-19 PROCEDURE — 25000132 ZZH RX MED GY IP 250 OP 250 PS 637: Performed by: ANESTHESIOLOGY

## 2018-10-19 PROCEDURE — 36415 COLL VENOUS BLD VENIPUNCTURE: CPT | Performed by: ORTHOPAEDIC SURGERY

## 2018-10-19 PROCEDURE — 25000132 ZZH RX MED GY IP 250 OP 250 PS 637: Performed by: INTERNAL MEDICINE

## 2018-10-19 PROCEDURE — 99207 ZZC CDG-CODE CATEGORY CHANGED: CPT | Performed by: INTERNAL MEDICINE

## 2018-10-19 PROCEDURE — 85018 HEMOGLOBIN: CPT | Performed by: ORTHOPAEDIC SURGERY

## 2018-10-19 PROCEDURE — G0378 HOSPITAL OBSERVATION PER HR: HCPCS

## 2018-10-19 PROCEDURE — 25000125 ZZHC RX 250: Performed by: HOSPITALIST

## 2018-10-19 PROCEDURE — 25000128 H RX IP 250 OP 636: Performed by: HOSPITALIST

## 2018-10-19 RX ORDER — IBUPROFEN 200 MG
600 TABLET ORAL EVERY 6 HOURS PRN
Qty: 40 TABLET | Refills: 0 | Status: SHIPPED | OUTPATIENT
Start: 2018-10-19 | End: 2022-01-26

## 2018-10-19 RX ORDER — OXYCODONE HYDROCHLORIDE 5 MG/1
5 TABLET ORAL EVERY 4 HOURS PRN
Qty: 15 TABLET | Refills: 0 | Status: SHIPPED | OUTPATIENT
Start: 2018-10-19 | End: 2018-12-14

## 2018-10-19 RX ORDER — AMOXICILLIN AND CLAVULANATE POTASSIUM 500; 125 MG/1; MG/1
1 TABLET, FILM COATED ORAL 3 TIMES DAILY
Qty: 30 TABLET | Refills: 0 | Status: SHIPPED | OUTPATIENT
Start: 2018-10-19 | End: 2018-12-14

## 2018-10-19 RX ORDER — DIPHENHYDRAMINE HCL 25 MG
25 CAPSULE ORAL EVERY 6 HOURS PRN
Qty: 10 CAPSULE | Refills: 0 | Status: SHIPPED | OUTPATIENT
Start: 2018-10-19 | End: 2020-01-09

## 2018-10-19 RX ORDER — ONDANSETRON 4 MG/1
4 TABLET, ORALLY DISINTEGRATING ORAL EVERY 6 HOURS PRN
Qty: 10 TABLET | Refills: 0 | Status: SHIPPED | OUTPATIENT
Start: 2018-10-19 | End: 2019-01-28

## 2018-10-19 RX ORDER — AMOXICILLIN 250 MG
2 CAPSULE ORAL 2 TIMES DAILY PRN
Qty: 40 TABLET | Refills: 0 | Status: SHIPPED | OUTPATIENT
Start: 2018-10-19 | End: 2018-10-25

## 2018-10-19 RX ORDER — ACETAMINOPHEN 325 MG/1
650 TABLET ORAL EVERY 4 HOURS PRN
Qty: 40 TABLET | Refills: 0 | Status: SHIPPED | OUTPATIENT
Start: 2018-10-19 | End: 2022-01-05

## 2018-10-19 RX ADMIN — Medication 1 MG: at 22:35

## 2018-10-19 RX ADMIN — DIPHENHYDRAMINE HYDROCHLORIDE 25 MG: 25 CAPSULE ORAL at 14:04

## 2018-10-19 RX ADMIN — CLINDAMYCIN PHOSPHATE 900 MG: 900 INJECTION, SOLUTION INTRAVENOUS at 22:35

## 2018-10-19 RX ADMIN — SENNOSIDES AND DOCUSATE SODIUM 2 TABLET: 8.6; 5 TABLET ORAL at 15:34

## 2018-10-19 RX ADMIN — CLINDAMYCIN PHOSPHATE 900 MG: 900 INJECTION, SOLUTION INTRAVENOUS at 15:30

## 2018-10-19 RX ADMIN — OXYCODONE HYDROCHLORIDE 5 MG: 5 TABLET ORAL at 15:34

## 2018-10-19 RX ADMIN — OXYCODONE HYDROCHLORIDE 5 MG: 5 TABLET ORAL at 02:34

## 2018-10-19 RX ADMIN — CLINDAMYCIN PHOSPHATE 900 MG: 900 INJECTION, SOLUTION INTRAVENOUS at 06:36

## 2018-10-19 RX ADMIN — IBUPROFEN 400 MG: 200 TABLET, FILM COATED ORAL at 14:03

## 2018-10-19 RX ADMIN — DEXTROSE AND SODIUM CHLORIDE: 5; 900 INJECTION, SOLUTION INTRAVENOUS at 02:26

## 2018-10-19 RX ADMIN — DEXTROSE AND SODIUM CHLORIDE: 5; 900 INJECTION, SOLUTION INTRAVENOUS at 19:53

## 2018-10-19 RX ADMIN — OXYCODONE HYDROCHLORIDE 5 MG: 5 TABLET ORAL at 08:14

## 2018-10-19 RX ADMIN — DIPHENHYDRAMINE HYDROCHLORIDE 25 MG: 25 CAPSULE ORAL at 22:34

## 2018-10-19 RX ADMIN — OXYCODONE HYDROCHLORIDE 5 MG: 5 TABLET ORAL at 19:49

## 2018-10-19 RX ADMIN — IBUPROFEN 400 MG: 200 TABLET, FILM COATED ORAL at 22:35

## 2018-10-19 NOTE — PLAN OF CARE
"Problem: Patient Care Overview  Goal: Plan of Care/Patient Progress Review  Outcome: Improving  VS /68  Temp 98.1  F (36.7  C) (Oral)  Resp 18  Ht 1.676 m (5' 6\")  Wt 105.2 kg (232 lb)  SpO2 97%  BMI 37.45 kg/m2  Lung sounds clear  O2 room air  Bowel sounds active, c/o constipation; senna given PRN  Tolerating regular diet  IVF Maintenance fluids infusing  Dressings wound  soak of incision ordered twice a day, one completed this shift and new dressing in place  Tests wound cultures pending  CMS intact  Activity up with stand by assist  Pain oxycodone given PRN x2 this shift for pain rating 6/10; ibuprofen and benadryl given PRN prophylactic for clindamycin infusion  Patient/family centered care mother updated on plan of care following ortho rounding  Discharge plan plan to discharge tomorrow morning following completion of clindamycin treatment        "

## 2018-10-19 NOTE — PLAN OF CARE
Problem: Skin and Soft Tissue Infection (Adult)  Goal: Signs and Symptoms of Listed Potential Problems Will be Absent, Minimized or Managed (Skin and Soft Tissue Infection)  Signs and symptoms of listed potential problems will be absent, minimized or managed by discharge/transition of care (reference Skin and Soft Tissue Infection (Adult) CPG).   Outcome: No Change  Vital Signs:  VSS, afebrile  Pain/Comfort:  Oxycodone given for pain  Assessment: slight numbness and tingling in left pointer finger, dressing is dry and intact  Diet:  regular  Output:  Voiding  Activity/Ambulation:  Bedrest with up to bathroom   Social: No visitors  Plan: will continue to monitor  closely and medicate for pain as needed

## 2018-10-19 NOTE — PLAN OF CARE
Problem: Patient Care Overview  Goal: Plan of Care/Patient Progress Review  Outcome: Improving  PRIMARY DIAGNOSIS: irrigation and debridement of L index finger  OUTPATIENT/OBSERVATION GOALS TO BE MET BEFORE DISCHARGE:  1. Stable vital signs Yes  2. Tolerating diet:Yes  3. Pain controlled with oral pain medications:  Yes  4. Positive bowel sounds:  Yes  5. Voiding without difficulty:  Yes  6. Able to ambulate:  Yes  7. Provider specific discharge goals met:  No, will stay until tomorrow AM for IV antibiotics    Discharge Planner Nurse   Safe discharge environment identified: Yes  Barriers to discharge: No       Entered by: Beatrice Vaz 10/19/2018 10:39 AM     Please review provider order for any additional goals.   Nurse to notify provider when observation goals have been met and patient is ready for discharge.

## 2018-10-19 NOTE — PROGRESS NOTES
Orthopedic Surgery  Karen Yamila Orellana  10/19/2018  Admit Date:  10/16/2018  POD # 1  S/P Left paronychia and left index finger tenosynovitis I&D    Patient resting comfortably in bed.    Pain controlled.  Tolerating oral intake.    Denies nausea or vomiting  Denies chest pain or shortness of breath  No events overnight.     Alert and orient to person, place, and time.  Vital Sign Ranges  Temperature Temp  Av.7  F (36.5  C)  Min: 97.2  F (36.2  C)  Max: 98.1  F (36.7  C)   Blood pressure Systolic (24hrs), Av , Min:103 , Max:137        Diastolic (24hrs), Av, Min:59, Max:88      Pulse No Data Recorded   Respirations Resp  Av.2  Min: 7  Max: 22   Pulse oximetry SpO2  Av %  Min: 93 %  Max: 100 %       Dressings clean, dry and intact - removed  Xeroform left on and will allow patient to soak off  Swelling significantly improved  Sensation intact  ROM improving    Labs:  Recent Labs   Lab Test  10/18/18   0636  10/16/18   2158  18   0839   POTASSIUM  4.0  3.4  4.0     Recent Labs   Lab Test  10/19/18   0704  10/18/18   0636  10/17/18   0541   HGB  11.0*  12.4  12.1     Recent Labs   Lab Test  10/18/18   0636   INR  1.02     Recent Labs   Lab Test  10/18/18   0636  10/17/18   0541  10/16/18   2158   PLT  392  400  436       A/P  1. Plan   Continue ambulation DVT prophylaxis.     Mobilize with PT/OT    WBAT.   Keep incisions clean, dry, and covered other than soaking with Hibiclens BID (20 min each time)     Continue current pain regiment.   IV abx today   Ok to discharge tomorrow AM   Oral abx on discharge - Augmentin    2. Disposition   Anticipate d/c to home tomorrow    Clara Cristina PA-C

## 2018-10-19 NOTE — PROGRESS NOTES
[facility]    Hospitalist Progress Note      Assessment & Plan   Karen Orellana is a 28 year old female with PMH of PCOS, and DUB causing blood loss anemia that has required periodic transfusions who presents with Left hand 2nd finger nail perionychia with possible felon after having her nails done approximately 10 days ago    Left finger paronychia and left index finger flexor tenosynovitis s/p I & D on 10/18  Unsuccessful attempt for I & D in the ED.  Initially received Ceftriaxone/vanc. This was discontinued and changed to Clindamycin on admission.   - see op note for detail  - f/u op cultures-- no growth so far  - continue with IV Clinda. Ortho like to continue IV antibiotics 1 more day, likely to PO tomorrow  - ortho following, appreciate help  - pain control with motrin prn, oxy prn also available      DVT Prophylaxis: Ambulate every shift  Code Status: Full Code    Disposition: Expected discharge tomorrow with oral antibiotics.       Chaitanya Fisher MD  Text Page  (7am to 6pm)    Interval History   Doing good today.   Reports Clinda makes her itchy when the infusion starts, and benadryl helps. Offered to change antibiotics and she said she can tolerated it and its fine.   No rashes.     -Data reviewed today: I reviewed all new labs and imaging results over the last 24 hours. I personally reviewed no images or EKG's today.    Physical Exam   Temp: 98.1  F (36.7  C) Temp src: Oral BP: 110/68   Heart Rate: 66 Resp: 18 SpO2: 97 % O2 Device: None (Room air)    Vitals:    10/16/18 2303 10/17/18 0116   Weight: 106.1 kg (234 lb) 105.2 kg (232 lb)     Vital Signs with Ranges  Temp:  [97.2  F (36.2  C)-98.1  F (36.7  C)] 98.1  F (36.7  C)  Heart Rate:  [54-89] 66  Resp:  [18-20] 18  BP: (103-126)/(59-68) 110/68  SpO2:  [97 %-99 %] 97 %  I/O last 3 completed shifts:  In: 4141 [P.O.:1040; I.V.:3101]  Out: -     Constitutional: Alert and awake, no apparent distress  Respiratory: Cleat to  auscultation bilaterally  Cardiovascular: regular rate and rhythm  GI: soft and non-tender  Skin/Integumen: warm and dry  Other:  left index finger ACE wrapped    Medications     dextrose 5% and 0.9% NaCl 75 mL/hr at 10/19/18 0226       ceFAZolin  1 g Intravenous See Admin Instructions     clindamycin  900 mg Intravenous Q8H     lidocaine  5 mL Infiltration Once     lidocaine/EPINEPHrine/tetracaine  3 mL Topical Once     norethindrone-ethinyl estradiol-iron  1 tablet Oral Daily     sodium chloride (PF)  3 mL Intracatheter Q8H       Data     Recent Labs  Lab 10/19/18  0704 10/18/18  0636 10/17/18  0541 10/16/18  2158   WBC  --  9.4 10.8 11.6*   HGB 11.0* 12.4 12.1 13.1   MCV  --  89 89 89   PLT  --  392 400 436   INR  --  1.02  --   --    NA  --  138  --  137   POTASSIUM  --  4.0  --  3.4   CHLORIDE  --  106  --  104   CO2  --  25  --  26   BUN  --  10  --  9   CR  --  0.62  --  0.68   ANIONGAP  --  7  --  7   LIBRA  --  8.2*  --  8.9   GLC  --  93  --  86       No results found for this or any previous visit (from the past 24 hour(s)).

## 2018-10-19 NOTE — PLAN OF CARE
Discharge Planner OT   Patient plan for discharge: home  Current status: Pt is POD #1 I&D of finger. Spoke with RN, there are no concerns, pt able to manage ADL's ind. No further orders from ortho regarding exercises to be addressed. Will complete OT orders.   Barriers to return to prior living situation: none anticipated  Recommendations for discharge: per ortho/ MD instructions  Rationale for recommendations: No skilled IP OT needs identified.        Entered by: Kenzie Harrell 10/19/2018 11:09 AM

## 2018-10-19 NOTE — PLAN OF CARE
Problem: Patient Care Overview  Goal: Plan of Care/Patient Progress Review  Outcome: Improving  Pt tolerating pain with po analgesics, hand elevated on pillows, CMS intact, continue to monitor and provide for needs.

## 2018-10-20 VITALS
WEIGHT: 232 LBS | DIASTOLIC BLOOD PRESSURE: 68 MMHG | OXYGEN SATURATION: 97 % | BODY MASS INDEX: 37.28 KG/M2 | HEIGHT: 66 IN | RESPIRATION RATE: 16 BRPM | SYSTOLIC BLOOD PRESSURE: 121 MMHG | HEART RATE: 69 BPM | TEMPERATURE: 97.8 F

## 2018-10-20 LAB
BACTERIA SPEC CULT: ABNORMAL
BACTERIA SPEC CULT: NO GROWTH
GLUCOSE SERPL-MCNC: 86 MG/DL (ref 70–99)
HGB BLD-MCNC: 11.8 G/DL (ref 11.7–15.7)
Lab: ABNORMAL
Lab: NORMAL
SPECIMEN SOURCE: ABNORMAL
SPECIMEN SOURCE: NORMAL

## 2018-10-20 PROCEDURE — 99217 ZZC OBSERVATION CARE DISCHARGE: CPT | Performed by: INTERNAL MEDICINE

## 2018-10-20 PROCEDURE — 36415 COLL VENOUS BLD VENIPUNCTURE: CPT | Performed by: ORTHOPAEDIC SURGERY

## 2018-10-20 PROCEDURE — 25000132 ZZH RX MED GY IP 250 OP 250 PS 637: Performed by: ANESTHESIOLOGY

## 2018-10-20 PROCEDURE — 25000132 ZZH RX MED GY IP 250 OP 250 PS 637: Performed by: INTERNAL MEDICINE

## 2018-10-20 PROCEDURE — 25000128 H RX IP 250 OP 636: Performed by: HOSPITALIST

## 2018-10-20 PROCEDURE — 99207 ZZC CDG-CODE CATEGORY CHANGED: CPT | Performed by: INTERNAL MEDICINE

## 2018-10-20 PROCEDURE — G0378 HOSPITAL OBSERVATION PER HR: HCPCS

## 2018-10-20 PROCEDURE — 82947 ASSAY GLUCOSE BLOOD QUANT: CPT | Performed by: ORTHOPAEDIC SURGERY

## 2018-10-20 PROCEDURE — 85018 HEMOGLOBIN: CPT | Performed by: ORTHOPAEDIC SURGERY

## 2018-10-20 PROCEDURE — 25000125 ZZHC RX 250: Performed by: HOSPITALIST

## 2018-10-20 RX ADMIN — DEXTROSE AND SODIUM CHLORIDE: 5; 900 INJECTION, SOLUTION INTRAVENOUS at 10:50

## 2018-10-20 RX ADMIN — CLINDAMYCIN PHOSPHATE 900 MG: 900 INJECTION, SOLUTION INTRAVENOUS at 06:25

## 2018-10-20 RX ADMIN — CLINDAMYCIN PHOSPHATE 900 MG: 900 INJECTION, SOLUTION INTRAVENOUS at 14:02

## 2018-10-20 RX ADMIN — IBUPROFEN 400 MG: 200 TABLET, FILM COATED ORAL at 07:54

## 2018-10-20 RX ADMIN — DIPHENHYDRAMINE HYDROCHLORIDE 25 MG: 25 CAPSULE ORAL at 06:24

## 2018-10-20 RX ADMIN — OXYCODONE HYDROCHLORIDE 5 MG: 5 TABLET ORAL at 06:24

## 2018-10-20 RX ADMIN — OXYCODONE HYDROCHLORIDE 5 MG: 5 TABLET ORAL at 11:31

## 2018-10-20 ASSESSMENT — PAIN DESCRIPTION - DESCRIPTORS: DESCRIPTORS: THROBBING

## 2018-10-20 NOTE — PLAN OF CARE
Problem: Patient Care Overview  Goal: Plan of Care/Patient Progress Review  Outcome: Improving  PRIMARY DIAGNOSIS: ACUTE PAIN  OUTPATIENT/OBSERVATION GOALS TO BE MET BEFORE DISCHARGE:  1. Pain Status: Improved-controlled with oral pain medications.    2. Return to near baseline physical activity: Yes    3. Cleared for discharge by consultants (if involved): N/A    Discharge Planner Nurse   Safe discharge environment identified: Yes  Barriers to discharge: No       Entered by: Stephany May 10/20/2018 10:01 AM   A&Ox4, up independently, tolerating regular diet, pain managed with ibuprofen, family at the bedside  Please review provider order for any additional goals.   Nurse to notify provider when observation goals have been met and patient is ready for discharge.

## 2018-10-20 NOTE — DISCHARGE SUMMARY
[facility]    Discharge Summary  Hospitalist    Date of Admission:  10/16/2018  Date of Discharge:  10/20/2018  Discharging Provider: Rich Scott MD  Date of Service (when I saw the patient): 10/20/18    Discharge Diagnoses      1. Left finger paronychia and left index finger flexor tenosynovitis s/p I & D on 10/18  2.  History of PCO's    History of Present Illness   Karen Orellana is an 28 year old female who presented with left hand second finger nail perionychia.  Patient underwent incision and drainage on 10/18.  Please see H&P and hospital course for details    Hospital Course      Karen Orellana is a 28 year old female with PMH of PCOS, and DUB causing blood loss anemia that has required periodic transfusions who presents with Left hand 2nd finger nail perionychia with possible felon after having her nails done approximately 10 days ago     Left finger paronychia and left index finger flexor tenosynovitis s/p I & D on 10/18  Unsuccessful attempt for I & D in the ED.  Initially received Ceftriaxone/vanc. This was discontinued and changed to Clindamycin on admission.   Patient was put on IV clindamycin.  Orthopedic surgery was consulted and she had I & D on 10/18.  She was continued on IV antibiotic during hospital course.  She had improvement of her symptoms.  Orthopedic surgery evaluated her and recommended oral Augmentin for 10 more days.  Patient was discharged on oral Augmentin to complete course of treatment.  She was advised to follow-up with  at [facility] within 7-10 days to evaluate after surgery.  Patient understood the plan well.  She was discharged home in a stable condition.       Significant Results and Procedures   Results for orders placed or performed during the hospital encounter of 10/16/18   POC US SOFT TISSUE    Impression    PROCEDURE NOTE: ED BEDSIDE LIMITED ULTRASOUND  Name of the study: Soft Tissue  Performed by:   Jarvis  Indications: Finger pain, redness    Body Location / Organs Imaged: L first digit  Findings: Focal hypoechoic fluid collection on dorsum distal phalanx extending into volar tuft.    Interpretation: Findings c/w likely paronychia and felon.   Archiving of images: Images were also saved digitally to the internal hard drive of the ultrasound machine.               Pending Results   None  Code Status   Full Code       Primary Care Physician   Chase Villafuerte    Discharge Disposition   Discharged to home  Condition at discharge: Stable    Consultations This Hospital Stay   PHARMACY TO DOSE VANC  PHARMACY TO DOSE VANC  ORTHOPEDIC SURGERY IP CONSULT  OCCUPATIONAL THERAPY ADULT IP CONSULT    Time Spent on this Encounter   I, Rich Scott MD, personally saw the patient today and spent less than or equal to 30 minutes discharging this patient.    Discharge Orders     Reason for your hospital stay   Left index finger tenosynovitis with paronychia with incision and drainage     Follow-up and recommended labs and tests    Follow up with Dr. Tracy , at (location with clinic name or city) [facility], within 7-10 days  to evaluate after surgery. No follow up labs or test are needed.  Call [facility] for appointment or with any questions or concerns [phone]     Activity   Your activity upon discharge: activity as tolerated, keep incisions covered, clean, and dry  May shower with hand covered/sealed     Wound care and dressings   Instructions to care for your wound at home: as directed, daily dressing changes and soak twice daily for 20 minutes with Hibiclens and water.     Diet   Follow this diet upon discharge: Orders Placed This Encounter     Advance Diet as Tolerated: Regular Diet Adult       Discharge Medications   Current Discharge Medication List      START taking these medications    Details   acetaminophen (TYLENOL) 325 MG tablet Take 2 tablets (650 mg) by mouth every 4 hours as needed  for mild pain  Qty: 40 tablet, Refills: 0    Associated Diagnoses: Flexor tenosynovitis of finger; Perionychia of finger, left      amoxicillin-clavulanate (AUGMENTIN) 500-125 MG per tablet Take 1 tablet by mouth 3 times daily  Qty: 30 tablet, Refills: 0    Associated Diagnoses: Flexor tenosynovitis of finger; Perionychia of finger, left      diphenhydrAMINE (BENADRYL) 25 MG capsule Take 1 capsule (25 mg) by mouth every 6 hours as needed for itching  Qty: 10 capsule, Refills: 0    Associated Diagnoses: Flexor tenosynovitis of finger; Perionychia of finger, left      ibuprofen (ADVIL/MOTRIN) 200 MG tablet Take 3 tablets (600 mg) by mouth every 6 hours as needed for moderate pain  Qty: 40 tablet, Refills: 0    Associated Diagnoses: Flexor tenosynovitis of finger; Perionychia of finger, left      ondansetron (ZOFRAN-ODT) 4 MG ODT tab Take 1 tablet (4 mg) by mouth every 6 hours as needed for nausea or vomiting  Qty: 10 tablet, Refills: 0    Associated Diagnoses: Flexor tenosynovitis of finger; Perionychia of finger, left      oxyCODONE IR (ROXICODONE) 5 MG tablet Take 1 tablet (5 mg) by mouth every 4 hours as needed for moderate to severe pain  Qty: 15 tablet, Refills: 0    Associated Diagnoses: Flexor tenosynovitis of finger; Perionychia of finger, left      senna-docusate (SENOKOT-S;PERICOLACE) 8.6-50 MG per tablet Take 2 tablets by mouth 2 times daily as needed for constipation  Qty: 40 tablet, Refills: 0    Associated Diagnoses: Flexor tenosynovitis of finger; Perionychia of finger, left         CONTINUE these medications which have NOT CHANGED    Details   norethindrone-ethinyl estradiol-iron (MICROGESTIN FE1.5/30) 1.5-30 MG-MCG per tablet Take 1 tablet by mouth daily  Qty: 84 tablet, Refills: 3    Associated Diagnoses: Excessive or frequent menstruation             Allergies   Allergies   Allergen Reactions     No Known Drug Allergies      Data   Most Recent 3 CBC's:  Recent Labs   Lab Test  10/20/18   0648   10/19/18   0704  10/18/18   0636  10/17/18   0541  10/16/18   2158   WBC   --    --   9.4  10.8  11.6*   HGB  11.8  11.0*  12.4  12.1  13.1   MCV   --    --   89  89  89   PLT   --    --   392  400  436      Most Recent 3 BMP's:  Recent Labs   Lab Test  10/20/18   0648  10/18/18   0636  10/16/18   2158  06/01/18   0839   NA   --   138  137  139   POTASSIUM   --   4.0  3.4  4.0   CHLORIDE   --   106  104  108   CO2   --   25  26  22   BUN   --   10  9  7   CR   --   0.62  0.68  0.57   ANIONGAP   --   7  7  9   LIBRA   --   8.2*  8.9  8.9   GLC  86  93  86  87     Most Recent 2 LFT's:  Recent Labs   Lab Test  06/01/18   0839  04/30/18   1558   AST  20  16   ALT  31  23   ALKPHOS  59  67   BILITOTAL  0.3  0.1*     Most Recent INR's and Anticoagulation Dosing History:  Anticoagulation Dose History     Recent Dosing and Labs Latest Ref Rng & Units 10/18/2018    INR 0.86 - 1.14 1.02        Most Recent 3 Troponin's:No lab results found.  Most Recent Cholesterol Panel:  Recent Labs   Lab Test  12/26/17   1744   CHOL  155   LDL  89   HDL  54   TRIG  62     Most Recent 6 Bacteria Isolates From Any Culture (See EPIC Reports for Culture Details):  Recent Labs   Lab Test  10/18/18   0810  10/18/18   0802   CULT  Heavy growth  Prevotella bivia  Beta lactamase positive  Susceptibility testing not routinely done  *  Culture in progress  Light growth  Coagulase negative Staphylococcus  *  Light growth  Strain 2  Coagulase negative Staphylococcus  *  Susceptibility testing not routinely done  No growth  Culture negative monitoring continues     Most Recent TSH, T4 and A1c Labs:  Recent Labs   Lab Test  04/10/18   1555  12/26/17   1744   TSH  2.33  1.08   A1C   --   5.4

## 2018-10-20 NOTE — PLAN OF CARE
Problem: Patient Care Overview  Goal: Plan of Care/Patient Progress Review  Outcome: No Change  PRIMARY DIAGNOSIS: ACUTE PAIN  OUTPATIENT/OBSERVATION GOALS TO BE MET BEFORE DISCHARGE:  1. Pain Status: Improved-controlled with oral pain medications.    2. Return to near baseline physical activity: Yes    3. Cleared for discharge by consultants (if involved): Yes    Discharge Planner Nurse   Safe discharge environment identified: Yes  Barriers to discharge: No       Entered by: Stephany May 10/20/2018 2:40 PM   A&Ox4, independent, tolerating diet, heart sounds- WNL, lungs- clear, bowel sounds- active, voiding without difficulty, oxycodone given x1 for pain, hibiclens done. Plan is to discharge once meds arrive from pharmacy. Family at the bedside.   Please review provider order for any additional goals.   Nurse to notify provider when observation goals have been met and patient is ready for discharge.

## 2018-10-20 NOTE — PLAN OF CARE
Problem: Patient Care Overview  Goal: Plan of Care/Patient Progress Review  Outcome: Adequate for Discharge Date Met: 10/20/18  Discharged home to self care.  Discharge instructions given; all questions answered.  Aware of follow up recommendations.  Demonstrated dressing change to mother.  Mother verbalized understanding.

## 2018-10-20 NOTE — PLAN OF CARE
Problem: Patient Care Overview  Goal: Plan of Care/Patient Progress Review  Patient care overview 3 PM - 7 PM  Patient alert and oriented.  Up independently, walking in halls.  CMS intact, hibeclens/water soak done per orders, new dressing.  Regular diet.  Lung sounds clear.  Bowel sounds active.  VSS.

## 2018-10-20 NOTE — PLAN OF CARE
Problem: Patient Care Overview  Goal: Plan of Care/Patient Progress Review  Outcome: Improving  VSS, afebrile. Pain controlled with ibuprofen and oxycodone. Continues with IV cleocin. Left hand bandaged UTV, c/d/i. PIV infusing. Possible discharge home today. Will continue to monitor.

## 2018-10-23 ENCOUNTER — TELEPHONE (OUTPATIENT)
Dept: INTERNAL MEDICINE | Facility: CLINIC | Age: 28
End: 2018-10-23

## 2018-10-23 NOTE — TELEPHONE ENCOUNTER
IP F/U    Date: 10/20/18  Diagnosis: Flexor tenosynovitis of finger  Is patient active in care coordination? No  Was patient in TCU? No    Next 5 appointments (look out 90 days)     Oct 25, 2018 10:00 AM CDT   PHYSICAL with Chase Villafuerte MD   [facility] ([facility])    [address]  [facility] [address]   [phone]

## 2018-10-24 NOTE — TELEPHONE ENCOUNTER
Patient has scheduled follow up with Dr. Villafuerte tomorrow. Will leave encounter open to assure makes to appointment.

## 2018-10-25 ENCOUNTER — OFFICE VISIT (OUTPATIENT)
Dept: INTERNAL MEDICINE | Facility: CLINIC | Age: 28
End: 2018-10-25
Payer: COMMERCIAL

## 2018-10-25 VITALS
DIASTOLIC BLOOD PRESSURE: 77 MMHG | SYSTOLIC BLOOD PRESSURE: 115 MMHG | WEIGHT: 231.8 LBS | TEMPERATURE: 98.2 F | HEIGHT: 66 IN | HEART RATE: 89 BPM | RESPIRATION RATE: 20 BRPM | OXYGEN SATURATION: 98 % | BODY MASS INDEX: 37.25 KG/M2

## 2018-10-25 DIAGNOSIS — Z13.6 CARDIOVASCULAR SCREENING; LDL GOAL LESS THAN 160: ICD-10-CM

## 2018-10-25 DIAGNOSIS — N92.0 EXCESSIVE OR FREQUENT MENSTRUATION: ICD-10-CM

## 2018-10-25 DIAGNOSIS — E55.9 VITAMIN D DEFICIENCY: Primary | ICD-10-CM

## 2018-10-25 LAB
BACTERIA SPEC CULT: ABNORMAL
BACTERIA SPEC CULT: NORMAL
Lab: ABNORMAL
Lab: NORMAL
SPECIMEN SOURCE: ABNORMAL
SPECIMEN SOURCE: NORMAL

## 2018-10-25 PROCEDURE — 82306 VITAMIN D 25 HYDROXY: CPT | Performed by: INTERNAL MEDICINE

## 2018-10-25 PROCEDURE — 80061 LIPID PANEL: CPT | Performed by: INTERNAL MEDICINE

## 2018-10-25 PROCEDURE — 99395 PREV VISIT EST AGE 18-39: CPT | Performed by: INTERNAL MEDICINE

## 2018-10-25 PROCEDURE — 36415 COLL VENOUS BLD VENIPUNCTURE: CPT | Performed by: INTERNAL MEDICINE

## 2018-10-25 RX ORDER — NORETHINDRONE ACETATE AND ETHINYL ESTRADIOL 1.5-30(21)
1 KIT ORAL DAILY
Qty: 84 TABLET | Refills: 3 | Status: SHIPPED | OUTPATIENT
Start: 2018-10-25 | End: 2020-01-08

## 2018-10-25 ASSESSMENT — ENCOUNTER SYMPTOMS
ALLERGIC/IMMUNOLOGIC NEGATIVE: 1
MUSCULOSKELETAL NEGATIVE: 1
PSYCHIATRIC NEGATIVE: 1
EYES NEGATIVE: 1
CARDIOVASCULAR NEGATIVE: 1
DIARRHEA: 1
HEMATOLOGIC/LYMPHATIC NEGATIVE: 1
ENDOCRINE NEGATIVE: 1
RESPIRATORY NEGATIVE: 1
NAUSEA: 1
HEADACHES: 1
CONSTITUTIONAL NEGATIVE: 1

## 2018-10-25 NOTE — PROGRESS NOTES
SUBJECTIVE:   CC: Karen Orellana is an 28 year old woman who presents for preventive health visit.     Physical   Annual:     Getting at least 3 servings of Calcium per day:  Yes    Bi-annual eye exam:  NO    Dental care twice a year:  Yes    Sleep apnea or symptoms of sleep apnea:  None    Diet:  Regular (no restrictions)    Frequency of exercise:  None    Taking medications regularly:  Yes    Medication side effects:  None    Additional concerns today:  No    She was in ED recently and had episode left finger paronychia and left index finger flexor tenosynovitis s/p I&D on 10/18  On Augmentin.       Today's PHQ-2 Score:   PHQ-2 ( 1999 [employer]) 10/25/2018   Q1: Little interest or pleasure in doing things 0   Q2: Feeling down, depressed or hopeless 0   PHQ-2 Score 0   Q1: Little interest or pleasure in doing things -   Q2: Feeling down, depressed or hopeless -   PHQ-2 Score -       Abuse: Current or Past(Physical, Sexual or Emotional)- No  Do you feel safe in your environment - Yes    Social History   Substance Use Topics     Smoking status: Never Smoker     Smokeless tobacco: Never Used     Alcohol use Yes      Comment: 1-2 times per week     Occasional.    Reviewed orders with patient.  Reviewed health maintenance and updated orders accordingly - Yes  Patient Active Problem List   Diagnosis     Iron deficiency anemia     CARDIOVASCULAR SCREENING; LDL GOAL LESS THAN 160     Morbid obesity due to excess calories (H)     Migraine without aura and without status migrainosus, not intractable     Iron deficiency anemia due to chronic blood loss     Perionychia of finger, left     Past Surgical History:   Procedure Laterality Date     IRRIGATION AND DEBRIDEMENT FINGER, COMBINED Left 10/18/2018    Procedure: 1.  Sharp incision and drainage, left index finger paronychia.  2.  Left index finger incision and drainage flexor tendon sheath. ;  Surgeon: Marco Tracy MD;  Location: [facility] OR     NO HISTORY OF  "SURGERY         Social History   Substance Use Topics     Smoking status: Never Smoker     Smokeless tobacco: Never Used     Alcohol use Yes      Comment: 1-2 times per week     Family History   Problem Relation Age of Onset     Diabetes Father      Asthma Mother      Hypertension Mother      Hypoglycemia Mother      Diabetes Paternal Grandmother      Diabetes Paternal Grandfather      Diabetes Maternal Uncle      Diabetes Maternal Aunt      Hypertension Maternal Grandmother      Breast Cancer Maternal Grandmother            Mammogram not appropriate for this patient based on age.    Pertinent mammograms are reviewed under the imaging tab.  History of abnormal Pap smear: NO - age 21-29 PAP every 3 years recommended  PAP / HPV 3/12/2018 7/23/2014   PAP NIL NIL     Reviewed and updated as needed this visit by clinical staff  Tobacco  Allergies  Meds  Med Hx  Surg Hx  Fam Hx  Soc Hx        Reviewed and updated as needed this visit by Provider            Review of Systems   Constitutional: Negative.    HENT: Negative.    Eyes: Negative.    Respiratory: Negative.    Cardiovascular: Negative.    Gastrointestinal: Positive for diarrhea and nausea.   Endocrine: Negative.    Breasts:  negative.    Genitourinary: Negative.    Musculoskeletal: Negative.    Skin: Negative.    Allergic/Immunologic: Negative.    Neurological: Positive for headaches.   Hematological: Negative.    Psychiatric/Behavioral: Negative.           OBJECTIVE:   /77 (BP Location: Right arm, Patient Position: Sitting, Cuff Size: Adult Large)  Pulse 89  Temp 98.2  F (36.8  C) (Oral)  Resp 20  Ht 5' 6\" (1.676 m)  Wt 231 lb 12.8 oz (105.1 kg)  LMP 09/28/2018 (Exact Date)  SpO2 98%  BMI 37.41 kg/m2  Physical Exam  GENERAL: healthy, alert and no distress  NECK: no adenopathy, no asymmetry, masses, or scars and thyroid normal to palpation  RESP: lungs clear to auscultation - no rales, rhonchi or wheezes  CV: regular rate and rhythm, normal S1 " "S2, no S3 or S4, no murmur, click or rub, no peripheral edema and peripheral pulses strong  ABDOMEN: soft, nontender, no hepatosplenomegaly, no masses and bowel sounds normal  MS: no gross musculoskeletal defects noted, no edema    Diagnostic Test Results:  none     ASSESSMENT/PLAN:   1. Vitamin D deficiency  screen  - Vitamin D Deficiency    2. CARDIOVASCULAR SCREENING; LDL GOAL LESS THAN 160  screen  - Lipid panel reflex to direct LDL Fasting    3. Excessive or frequent menstruation  OCPs  - norethindrone-ethinyl estradiol-iron (MICROGESTIN FE1.5/30) 1.5-30 MG-MCG per tablet; Take 1 tablet by mouth daily  Dispense: 84 tablet; Refill: 3    COUNSELING:  Reviewed preventive health counseling, as reflected in patient instructions       Regular exercise       Healthy diet/nutrition    BP Readings from Last 1 Encounters:   10/25/18 115/77     Estimated body mass index is 37.41 kg/(m^2) as calculated from the following:    Height as of this encounter: 5' 6\" (1.676 m).    Weight as of this encounter: 231 lb 12.8 oz (105.1 kg).           reports that she has never smoked. She has never used smokeless tobacco.      Counseling Resources:  ATP IV Guidelines  Pooled Cohorts Equation Calculator  Breast Cancer Risk Calculator  FRAX Risk Assessment  ICSI Preventive Guidelines  Dietary Guidelines for Americans, 2010  USDA's MyPlate  ASA Prophylaxis  Lung CA Screening    Chase Villafuerte MD  [facility]  "

## 2018-10-25 NOTE — MR AVS SNAPSHOT
After Visit Summary   10/25/2018    Karen Orellana    MRN: [id number]           Patient Information     Date Of Birth          1990        Visit Information        Provider Department      10/25/2018 10:00 AM Chase Villafuerte MD [facility]        Today's Diagnoses     Vitamin D deficiency    -  1    CARDIOVASCULAR SCREENING; LDL GOAL LESS THAN 160        Excessive or frequent menstruation          Care Instructions    Follow up with the hematology clinic, and endocrinology clinic-[facility]         Preventive Health Recommendations  Female Ages 26 - 39  Yearly exam:   See your health care provider every year in order to    Review health changes.     Discuss preventive care.      Review your medicines if you your doctor has prescribed any.    Until age 30: Get a Pap test every three years (more often if you have had an abnormal result).    After age 30: Talk to your doctor about whether you should have a Pap test every 3 years or have a Pap test with HPV screening every 5 years.   You do not need a Pap test if your uterus was removed (hysterectomy) and you have not had cancer.  You should be tested each year for STDs (sexually transmitted diseases), if you're at risk.   Talk to your provider about how often to have your cholesterol checked.  If you are at risk for diabetes, you should have a diabetes test (fasting glucose).  Shots: Get a flu shot each year. Get a tetanus shot every 10 years.   Nutrition:     Eat at least 5 servings of fruits and vegetables each day.    Eat whole-grain bread, whole-wheat pasta and brown rice instead of white grains and rice.    Get adequate Calcium and Vitamin D.     Lifestyle    Exercise at least 150 minutes a week (30 minutes a day, 5 days of the week). This will help you control your weight and prevent disease.    Limit alcohol to one drink per day.    No smoking.     Wear sunscreen to prevent skin cancer.    See your dentist every six  months for an exam and cleaning.            Follow-ups after your visit        Your next 10 appointments already scheduled     Dec 14, 2018  9:30 AM CST   Office Visit with Jen Henry MD   [facility]    [address] [place] [id number] [phone]           Bring a current list of meds and any records pertaining to this visit. For Physicals, please bring immunization records and any forms needing to be filled out. Please arrive 10 minutes early to complete paperwork.              Who to contact     If you have questions or need follow up information about today's clinic visit or your schedule please contact [facility] directly at [phone].  Normal or non-critical lab and imaging results will be communicated to you by MyChart, letter or phone within 4 business days after the clinic has received the results. If you do not hear from us within 7 days, please contact the clinic through XLeranthart or phone. If you have a critical or abnormal lab result, we will notify you by phone as soon as possible.  Submit refill requests through [facility]erly or call your pharmacy and they will forward the refill request to us. Please allow 3 business days for your refill to be completed.          Additional Information About Your Visit        MyCharHoodinn Information     Marker[employer] gives you secure access to your electronic health record. If you see a primary care provider, you can also send messages to your care team and make appointments. If you have questions, please call your primary care clinic.  If you do not have a primary care provider, please call [phone] and they will assist you.        Care EveryWhere ID     This is your Care EveryWhere ID. This could be used by other organizations to access your [facility] medical records  [id number]        Your Vitals Were     Pulse Temperature Respirations Height Last Period Pulse Oximetry    89  "98.2  F (36.8  C) (Oral) 20 5' 6\" (1.676 m) 09/28/2018 (Exact Date) 98%    BMI (Body Mass Index)                   37.41 kg/m2            Blood Pressure from Last 3 Encounters:   10/25/18 115/77   10/20/18 121/68   06/06/18 121/82    Weight from Last 3 Encounters:   10/25/18 231 lb 12.8 oz (105.1 kg)   10/17/18 232 lb (105.2 kg)   06/06/18 225 lb 12.8 oz (102.4 kg)              We Performed the Following     Lipid panel reflex to direct LDL Fasting     Vitamin D Deficiency          Where to get your medicines      These medications were sent to [id number] - [place], [place] - [address]  [phone]    Hours:  9Am-9Pm (M-F) 9Am-6Pm (S&S) Phone:  [phone]     norethindrone-ethinyl estradiol-iron 1.5-30 MG-MCG per tablet          Primary Care Provider Office Phone # Fax #    Chase Villafuerte [phone]       [address]  [facility] [address]        Equal Access to Services     YVETTE CLEARY AH: Hadii selwyn layo Sohannahali, waaxda luqadaha, qaybta kaalmada adeegyada, tawanda dash. So [facility] [phone].    ATENCIÓN: Si habla español, tiene a velazquez disposición servicios gratuitos de asistencia lingüística. Llame al [phone].    We comply with applicable federal civil rights laws and [place] laws. We do not discriminate on the basis of race, color, national origin, age, disability, sex, sexual orientation, or gender identity.            Thank you!     Thank you for choosing [facility]  for your care. Our goal is always to provide you with excellent care. Hearing back from our patients is one way we can continue to improve our services. Please take a few minutes to complete the written survey that you may receive in the mail after your visit with us. Thank you!             Your Updated Medication List - Protect others around you: Learn how to safely use, store and throw away your medicines at " www.disposemymeds.org.          This list is accurate as of 10/25/18 11:59 PM.  Always use your most recent med list.                   Brand Name Dispense Instructions for use Diagnosis    acetaminophen 325 MG tablet    TYLENOL    40 tablet    Take 2 tablets (650 mg) by mouth every 4 hours as needed for mild pain    Flexor tenosynovitis of finger, Perionychia of finger, left       amoxicillin-clavulanate 500-125 MG per tablet    AUGMENTIN    30 tablet    Take 1 tablet by mouth 3 times daily    Flexor tenosynovitis of finger, Perionychia of finger, left       diphenhydrAMINE 25 MG capsule    BENADRYL    10 capsule    Take 1 capsule (25 mg) by mouth every 6 hours as needed for itching    Flexor tenosynovitis of finger, Perionychia of finger, left       ibuprofen 200 MG tablet    ADVIL/MOTRIN    40 tablet    Take 3 tablets (600 mg) by mouth every 6 hours as needed for moderate pain    Flexor tenosynovitis of finger, Perionychia of finger, left       norethindrone-ethinyl estradiol-iron 1.5-30 MG-MCG per tablet    MICROGESTIN FE1.5/30    84 tablet    Take 1 tablet by mouth daily    Excessive or frequent menstruation       ondansetron 4 MG ODT tab    ZOFRAN-ODT    10 tablet    Take 1 tablet (4 mg) by mouth every 6 hours as needed for nausea or vomiting    Flexor tenosynovitis of finger, Perionychia of finger, left       oxyCODONE IR 5 MG tablet    ROXICODONE    15 tablet    Take 1 tablet (5 mg) by mouth every 4 hours as needed for moderate to severe pain    Flexor tenosynovitis of finger, Perionychia of finger, left

## 2018-10-25 NOTE — PATIENT INSTRUCTIONS
[facility]         Preventive Health Recommendations  Female Ages 26 - 39  Yearly exam:   See your health care provider every year in order to    Review health changes.     Discuss preventive care.      Review your medicines if you your doctor has prescribed any.    Until age 30: Get a Pap test every three years (more often if you have had an abnormal result).    After age 30: Talk to your doctor about whether you should have a Pap test every 3 years or have a Pap test with HPV screening every 5 years.   You do not need a Pap test if your uterus was removed (hysterectomy) and you have not had cancer.  You should be tested each year for STDs (sexually transmitted diseases), if you're at risk.   Talk to your provider about how often to have your cholesterol checked.  If you are at risk for diabetes, you should have a diabetes test (fasting glucose).  Shots: Get a flu shot each year. Get a tetanus shot every 10 years.   Nutrition:     Eat at least 5 servings of fruits and vegetables each day.    Eat whole-grain bread, whole-wheat pasta and brown rice instead of white grains and rice.    Get adequate Calcium and Vitamin D.     Lifestyle    Exercise at least 150 minutes a week (30 minutes a day, 5 days of the week). This will help you control your weight and prevent disease.    Limit alcohol to one drink per day.    No smoking.     Wear sunscreen to prevent skin cancer.    See your dentist every six months for an exam and cleaning.

## 2018-10-25 NOTE — NURSING NOTE
"Vital signs:  Temp: 98.2  F (36.8  C) Temp src: Oral BP: 115/77 Pulse: 89   Resp: 20 SpO2: 98 %     Height: 5' 6\" (167.6 cm) Weight: 231 lb 12.8 oz (105.1 kg)  Estimated body mass index is 37.41 kg/(m^2) as calculated from the following:    Height as of this encounter: 5' 6\" (1.676 m).    Weight as of this encounter: 231 lb 12.8 oz (105.1 kg).        "

## 2018-10-26 LAB
CHOLEST SERPL-MCNC: 148 MG/DL
DEPRECATED CALCIDIOL+CALCIFEROL SERPL-MC: 12 UG/L (ref 20–75)
HDLC SERPL-MCNC: 49 MG/DL
LDLC SERPL CALC-MCNC: 81 MG/DL
NONHDLC SERPL-MCNC: 99 MG/DL
TRIGL SERPL-MCNC: 89 MG/DL

## 2018-10-26 ASSESSMENT — PATIENT HEALTH QUESTIONNAIRE - PHQ9: SUM OF ALL RESPONSES TO PHQ QUESTIONS 1-9: 9

## 2018-12-14 ENCOUNTER — OFFICE VISIT (OUTPATIENT)
Dept: OBGYN | Facility: CLINIC | Age: 28
End: 2018-12-14
Payer: COMMERCIAL

## 2018-12-14 ENCOUNTER — TELEPHONE (OUTPATIENT)
Dept: OBGYN | Facility: CLINIC | Age: 28
End: 2018-12-14

## 2018-12-14 VITALS
BODY MASS INDEX: 37.93 KG/M2 | DIASTOLIC BLOOD PRESSURE: 89 MMHG | WEIGHT: 236 LBS | HEIGHT: 66 IN | SYSTOLIC BLOOD PRESSURE: 136 MMHG | HEART RATE: 97 BPM

## 2018-12-14 DIAGNOSIS — L73.2 HIDRADENITIS SUPPURATIVA: ICD-10-CM

## 2018-12-14 DIAGNOSIS — N94.10 DYSPAREUNIA, FEMALE: ICD-10-CM

## 2018-12-14 DIAGNOSIS — Q51.28 SEPTATE UTERUS: Primary | ICD-10-CM

## 2018-12-14 DIAGNOSIS — E28.2 PCOS (POLYCYSTIC OVARIAN SYNDROME): ICD-10-CM

## 2018-12-14 PROCEDURE — 99214 OFFICE O/P EST MOD 30 MIN: CPT | Performed by: OBSTETRICS & GYNECOLOGY

## 2018-12-14 ASSESSMENT — MIFFLIN-ST. JEOR: SCORE: 1817.24

## 2018-12-14 NOTE — NURSING NOTE
"Chief Complaint   Patient presents with     Follow Up     Follow up PCOS. Reports not much improvement on OCP. Reports clotting and discharge.        Initial /89   Pulse 97   Ht 1.676 m (5' 6\")   Wt 107 kg (236 lb)   LMP 2018 (Approximate)   Breastfeeding? No   BMI 38.09 kg/m   Estimated body mass index is 38.09 kg/m  as calculated from the following:    Height as of this encounter: 1.676 m (5' 6\").    Weight as of this encounter: 107 kg (236 lb).  BP completed using cuff size: large    Questioned patient about current smoking habits.  Pt. has never smoked.          Meg Spring LPN               "

## 2018-12-14 NOTE — PROGRESS NOTES
"  SUBJECTIVE:                                                   Karen Orellana is a 28 year old female who presents to clinic today to follow up for PCOS and uterine septum. Please see my last note for complete details.    Menstrual cycles:    Periods are absent on this oral contraceptive pill.  Dysmenorrhea none.   Cyclic symptoms include  none.   Additional symptoms include intermenstrual bleeding small clots 1-3 times a week, no heavy bleeding, small stringy clots, no pain.   Sexually active: pain with intercourse. Insertional pain with intercourse, always present since first time she tried. No lubricant tried. She has not really ever been able to have penile intercourse due to this.    STD History: No STD history  PID History: No  Ovarian cysts: No  IUD use: No    ROS:  Hidradenitis suppurativa? Cystic lesions in the axillary area and groin, sometimes around the breasts.  Anxiety, trouble sleeping. \"Shaky\" at times, feels anxious, mood changes, angry and frustrated.    Problem list and histories reviewed & adjusted, as indicated.  Additional history: as documented.    Patient Active Problem List   Diagnosis     Iron deficiency anemia     CARDIOVASCULAR SCREENING; LDL GOAL LESS THAN 160     Morbid obesity due to excess calories (H)     Migraine without aura and without status migrainosus, not intractable     Iron deficiency anemia due to chronic blood loss     Perionychia of finger, left     Past Surgical History:   Procedure Laterality Date     IRRIGATION AND DEBRIDEMENT FINGER, COMBINED Left 10/18/2018    Procedure: 1.  Sharp incision and drainage, left index finger paronychia.  2.  Left index finger incision and drainage flexor tendon sheath. ;  Surgeon: Marco Tracy MD;  Location: [facility] OR     NO HISTORY OF SURGERY        Social History     Tobacco Use     Smoking status: Never Smoker     Smokeless tobacco: Never Used   Substance Use Topics     Alcohol use: Yes     Comment: 1-2 times per week     "  Problem (# of Occurrences) Relation (Name,Age of Onset)    Asthma (1) Mother    Breast Cancer (1) Maternal Grandmother    Diabetes (5) Father, Paternal Grandmother, Paternal Grandfather, Maternal Uncle, Maternal Aunt    Hypertension (2) Mother, Maternal Grandmother    Hypoglycemia (1) Mother              Current Outpatient Medications on File Prior to Visit:  acetaminophen (TYLENOL) 325 MG tablet Take 2 tablets (650 mg) by mouth every 4 hours as needed for mild pain   cholecalciferol (VITAMIN D3) 56608 units capsule Take 1 capsule (50,000 Units) by mouth once a week   diphenhydrAMINE (BENADRYL) 25 MG capsule Take 1 capsule (25 mg) by mouth every 6 hours as needed for itching   ibuprofen (ADVIL/MOTRIN) 200 MG tablet Take 3 tablets (600 mg) by mouth every 6 hours as needed for moderate pain   norethindrone-ethinyl estradiol-iron (MICROGESTIN FE1.5/30) 1.5-30 MG-MCG per tablet Take 1 tablet by mouth daily   ondansetron (ZOFRAN-ODT) 4 MG ODT tab Take 1 tablet (4 mg) by mouth every 6 hours as needed for nausea or vomiting (Patient not taking: Reported on 12/14/2018)     No current facility-administered medications on file prior to visit.   Allergies   Allergen Reactions     No Known Drug Allergies        ROS:  5 point ROS negative except as noted above in HPI, including Gen., Resp., CV, GI &  system review.    OBJECTIVE:     No exam was necessary today as this was entirely a counseling appointment.    In-Clinic Test Results:  No results found for this or any previous visit (from the past 24 hour(s)).    ASSESSMENT/PLAN:                                                        ICD-10-CM    1. Septate uterus Q51.20    2. PCOS (polycystic ovarian syndrome) E28.2    3. Hidradenitis suppurativa L73.2    4. Dyspareunia, female N94.10        Due to time limits, we had a very limited discussion of hidradenitis suppurativa today. I did give her written information and she will review.    She continues to have some irregular  bleeding on this oral contraceptive pill. Risks, benefits, and alternatives to switching pills again versus EUA with possible hymenectomy, excision of vaginal septum if necessary, hysteroscopy and excision of uterine septum discussed as well. She would like to proceed with this. I will contact the . We discussed postoperative restrictions, and she will plan 3 days total off work for this.    Briefly discussed the possibility of physical therapy for vaginismus as well, if no septum or hymenal issues are noted at EUA.    For her anxiety, she will follow up with her primary care provider.    Jen Henry MD  [facility]

## 2018-12-14 NOTE — PATIENT INSTRUCTIONS
"What is hidradenitis suppurativa?     Hidradenitis suppurativa is a condition that causes red, swollen, painful bumps to form on the body, usually in places where the skin rubs together. These bumps can cause so much pain that they make it hard to move. They can smell bad or drain pus or blood. The bumps also tend to linger for weeks or months and keep coming back.  People who have hidradenitis suppurativa, also called \"HS,\" often have a hard time dealing with their problem. It can make them feel embarrassed and worried. Sometimes the condition can even cause problems in relationships, or in the workplace. If you have this problem, see a doctor or nurse. There are treatments that can help you.    Symptoms:   - red, swollen, painful bumps. These bumps can drain pus or blood.  The bumps usually form in places where the skin rubs together. Common locations include:  ?Armpits  ?On or under the breasts (in women)  ?In the groin area  ?Inner thighs  ?Buttocks  ?Around or near the anus  The skin problems caused by HS last a long time and get worse over time. Often the skin hardens and scars around the painful bumps. Plus, many bumps can form in a single area and sometimes form tunnels under the skin.    If you have symptoms of HS, see a doctor or nurse. He or she can look at your skin and find out if HS is the cause of your symptoms. Make sure you tell the doctor or nurse if you feel sad or embarrassed because of your symptoms. The doctor or nurse can help you deal with these problems.    Treatment can include:  ?Antibiotic liquids or gels that you put on the affected areas  ?Antibiotic pills, which you might need to take for a long time  ?Injections of steroid medicines into affected areas to bring down inflammation  Women with HS sometimes also take hormone treatments to improve their condition. This usually involves taking a birth control pill every day. Sometimes women take other types of hormone medicines, " "too.  People with severe, long-lasting problems can have surgery that helps HS to heal. They can also get special medicines that can reduce inflammation in the skin.    You should know that you did not do anything to cause your condition. It is not your fault. You did not cause it by being unclean. You should also know that you cannot spread your condition to anyone else. It is not \"contagious.\"  Here are some things you can do to reduce your symptoms:  ?Do not wear tight-fitting clothes  ?Try to avoid activities that cause your skin to rub against itself a lot  ?Shower every day and wash areas of HS gently with your fingers. Do not scrub affected areas with a washcloth, loofah, or brush.  ?Stop smoking, if you smoke. People who smoke are more likely to have HS  ?Lose weight, if you are overweight. HS is more common and more severe in people who are overweight.    This topic retrieved from [employer] on:Apr 19, 2017.    "

## 2018-12-14 NOTE — TELEPHONE ENCOUNTER
Procedure name(s) - multi select Exam under anesthesia, possible hymenectomy, possible excision of vaginal septum, hysteroscopy, myosure for uterine septum    Reason for procedure septate uterus, dyspareunia    Surgeon: Carl    Is this a multi surgeon case? No    Laterality N/A    Request for additional equipment Other (see comments)  fluid managment, Ligasure impact available (do not open)    Hysteroscopic Morcellator (Myosure)    Anesthesia General    Initiate Pre-op orders for above procedure: Yes, as ordered in Epic Additional orders noted there also   Location of Case: [facility]    Urgency of Surgery: Routine    Surgeon Procedure Time (incision to closure) in minutes (per procedure as applicable) 60    Note:  Surgical Case Time Needed (in minutes)   Patient Class (for admit prior to surgery, specify number of days in comments): Same day (hospital outpatient)    Why can t this outpatient surgery be done at the [facility] or  ASC? need for ligasure    H&P To Be Completed By: PCP    Post-Op Appointment 4 weeks    Office visit with surgeon prior to surgery: no    Vendor Needed? Yes for myosure and fluid management

## 2018-12-21 NOTE — TELEPHONE ENCOUNTER
Type of surgery: exam under anesthesia, possible hymenectomy, possible excision of vaginal septum, hysteroscopy, myosure for uterine septum  Location of surgery: [facility]  Date and time of surgery: 2/6/2019 @ 12:00  Surgeon: Dr. Jen Henry   Pre-Op Appt Date: 1/28/19 @ 5:20 DR. MATHIAS  Post-Op Appt Date: 3/8/19 @ 3:00   Packet sent out: Yes  Pre-cert/Authorization completed:  Not Applicable  Date: 12/21/2018

## 2019-01-28 ENCOUNTER — OFFICE VISIT (OUTPATIENT)
Dept: INTERNAL MEDICINE | Facility: CLINIC | Age: 29
End: 2019-01-28
Payer: COMMERCIAL

## 2019-01-28 VITALS
TEMPERATURE: 98.1 F | HEART RATE: 87 BPM | SYSTOLIC BLOOD PRESSURE: 120 MMHG | OXYGEN SATURATION: 100 % | HEIGHT: 66 IN | DIASTOLIC BLOOD PRESSURE: 81 MMHG | WEIGHT: 238.7 LBS | RESPIRATION RATE: 18 BRPM | BODY MASS INDEX: 38.36 KG/M2

## 2019-01-28 DIAGNOSIS — D50.0 IRON DEFICIENCY ANEMIA DUE TO CHRONIC BLOOD LOSS: ICD-10-CM

## 2019-01-28 DIAGNOSIS — E55.9 VITAMIN D DEFICIENCY: ICD-10-CM

## 2019-01-28 DIAGNOSIS — Z01.818 PREOP GENERAL PHYSICAL EXAM: Primary | ICD-10-CM

## 2019-01-28 LAB
BASOPHILS # BLD AUTO: 0 10E9/L (ref 0–0.2)
BASOPHILS NFR BLD AUTO: 0.4 %
DIFFERENTIAL METHOD BLD: NORMAL
EOSINOPHIL # BLD AUTO: 0.3 10E9/L (ref 0–0.7)
EOSINOPHIL NFR BLD AUTO: 2.5 %
ERYTHROCYTE [DISTWIDTH] IN BLOOD BY AUTOMATED COUNT: 12.9 % (ref 10–15)
HCT VFR BLD AUTO: 39.3 % (ref 35–47)
HGB BLD-MCNC: 12.9 G/DL (ref 11.7–15.7)
LYMPHOCYTES # BLD AUTO: 4 10E9/L (ref 0.8–5.3)
LYMPHOCYTES NFR BLD AUTO: 37.8 %
MCH RBC QN AUTO: 29.1 PG (ref 26.5–33)
MCHC RBC AUTO-ENTMCNC: 32.8 G/DL (ref 31.5–36.5)
MCV RBC AUTO: 89 FL (ref 78–100)
MONOCYTES # BLD AUTO: 0.7 10E9/L (ref 0–1.3)
MONOCYTES NFR BLD AUTO: 6.9 %
NEUTROPHILS # BLD AUTO: 5.5 10E9/L (ref 1.6–8.3)
NEUTROPHILS NFR BLD AUTO: 52.4 %
PLATELET # BLD AUTO: 394 10E9/L (ref 150–450)
RBC # BLD AUTO: 4.44 10E12/L (ref 3.8–5.2)
WBC # BLD AUTO: 10.5 10E9/L (ref 4–11)

## 2019-01-28 PROCEDURE — 83550 IRON BINDING TEST: CPT | Performed by: INTERNAL MEDICINE

## 2019-01-28 PROCEDURE — 85025 COMPLETE CBC W/AUTO DIFF WBC: CPT | Performed by: INTERNAL MEDICINE

## 2019-01-28 PROCEDURE — 99214 OFFICE O/P EST MOD 30 MIN: CPT | Performed by: INTERNAL MEDICINE

## 2019-01-28 PROCEDURE — 82728 ASSAY OF FERRITIN: CPT | Performed by: INTERNAL MEDICINE

## 2019-01-28 PROCEDURE — 82306 VITAMIN D 25 HYDROXY: CPT | Performed by: INTERNAL MEDICINE

## 2019-01-28 PROCEDURE — 36415 COLL VENOUS BLD VENIPUNCTURE: CPT | Performed by: INTERNAL MEDICINE

## 2019-01-28 PROCEDURE — 83540 ASSAY OF IRON: CPT | Performed by: INTERNAL MEDICINE

## 2019-01-28 ASSESSMENT — MIFFLIN-ST. JEOR: SCORE: 1829.49

## 2019-01-28 NOTE — PROGRESS NOTES
[place] [address]  [facility] [address]  [phone]  Dept: [phone]    PRE-OP EVALUATION:  Today's date: 2019    Karen Orellana (: 1990) presents for pre-operative evaluation assessment as requested by Dr. Jen Henry.  She requires evaluation and anesthesia risk assessment prior to undergoing surgery/procedure for treatment of exam under anesthesia, possible hymenectomy, possible excision of vaginal septum, hysterectomy, myosure for uterine septum, operative hysteroscopy with morcellator, and exam under anesthesia pelvic.    Fax number for surgical facility: [facility]  Primary Physician: Chase Villafuerte  Type of Anesthesia Anticipated: General    Patient has a Health Care Directive or Living Will:  NO    Preop Questions 2019   Who is doing your surgery? Dr. Jen Henry   What are you having done? Hymenectomy, and vaginal tissue removal   Date of Surgery/Procedure: 2019   Facility or Hospital where procedure/surgery will be performed: [facility]   1.  Do you have a history of Heart attack, stroke, stent, coronary bypass surgery, or other heart surgery? No   2.  Do you ever have any pain or discomfort in your chest? No   3.  Do you have a history of  Heart Failure? No   4.   Are you troubled by shortness of breath when:  walking on a level surface, or up a slight hill, or at night? No   5.  Do you currently have a cold, bronchitis or other respiratory infection? No   6.  Do you have a cough, shortness of breath, or wheezing? No   7.  Do you sometimes get pains in the calves of your legs when you walk? No   8. Do you or anyone in your family have previous history of blood clots? NO   9.  Do you or does anyone in your family have a serious bleeding problem such as prolonged bleeding following surgeries or cuts? No   10. Have you ever had problems with anemia or been told to take iron pills? YES    11. Have you had  any abnormal blood loss such as black, tarry or bloody stools, or abnormal vaginal bleeding? YES - Having irregular bleeding   12. Have you ever had a blood transfusion? No   13. Have you or any of your relatives ever had problems with anesthesia? No   14. Do you have sleep apnea, excessive snoring or daytime drowsiness? YES    15. Do you have any prosthetic heart valves? No   16. Do you have prosthetic joints? No   17. Is there any chance that you may be pregnant? No         HPI:     HPI related to upcoming procedure:     Patient going for following procedure: EUA with possible hymenectomy, excision of vaginal septum if necessary, hysteroscopy and excision of uterine septum for irregular bleeding.      ANEMIA - Patient has a recent history of moderate-severe anemia, which has been symptomatic.Labs ordered today to assess      MEDICAL HISTORY:     Patient Active Problem List    Diagnosis Date Noted     Perionychia of finger, left 10/17/2018     Priority: Medium     Iron deficiency anemia due to chronic blood loss 04/30/2018     Priority: Medium     Migraine without aura and without status migrainosus, not intractable 03/12/2018     Priority: Medium     Morbid obesity due to excess calories (H) 08/03/2016     Priority: Medium     CARDIOVASCULAR SCREENING; LDL GOAL LESS THAN 160 10/31/2010     Priority: Medium     Iron deficiency anemia 02/12/2009     Priority: Medium      Past Medical History:   Diagnosis Date     Anemia     2/2 DUB-has required 2-3 transfusions     PCOS (polycystic ovarian syndrome)      Past Surgical History:   Procedure Laterality Date     IRRIGATION AND DEBRIDEMENT FINGER, COMBINED Left 10/18/2018    Procedure: 1.  Sharp incision and drainage, left index finger paronychia.  2.  Left index finger incision and drainage flexor tendon sheath. ;  Surgeon: Marco Tracy MD;  Location:  OR     NO HISTORY OF SURGERY       Current Outpatient Medications   Medication Sig Dispense Refill      "acetaminophen (TYLENOL) 325 MG tablet Take 2 tablets (650 mg) by mouth every 4 hours as needed for mild pain 40 tablet 0     cholecalciferol (VITAMIN D3) 70500 units capsule Take 1 capsule (50,000 Units) by mouth once a week 8 capsule 1     diphenhydrAMINE (BENADRYL) 25 MG capsule Take 1 capsule (25 mg) by mouth every 6 hours as needed for itching 10 capsule 0     ibuprofen (ADVIL/MOTRIN) 200 MG tablet Take 3 tablets (600 mg) by mouth every 6 hours as needed for moderate pain 40 tablet 0     norethindrone-ethinyl estradiol-iron (MICROGESTIN FE1.5/30) 1.5-30 MG-MCG per tablet Take 1 tablet by mouth daily 84 tablet 3     OTC products: no recent use of OTC ASA, NSAIDS or Steroids    Allergies   Allergen Reactions     No Known Drug Allergies       Latex Allergy: NO    Social History     Tobacco Use     Smoking status: Never Smoker     Smokeless tobacco: Never Used   Substance Use Topics     Alcohol use: Yes     Comment: 1-2 times per week     History   Drug Use No       REVIEW OF SYSTEMS:   CONSTITUTIONAL: NEGATIVE for fever, chills, change in weight  ENT/MOUTH: NEGATIVE for ear, mouth and throat problems  RESP: NEGATIVE for significant cough or SOB  CV: NEGATIVE for chest pain, palpitations or peripheral edema    EXAM:   /81 (BP Location: Right arm, Patient Position: Sitting, Cuff Size: Adult Large)   Pulse 87   Temp 98.1  F (36.7  C) (Oral)   Resp 18   Ht 1.676 m (5' 6\")   Wt 108.3 kg (238 lb 11.2 oz)   SpO2 100%   BMI 38.53 kg/m    GENERAL APPEARANCE: healthy, alert and no distress  HENT: ear canals and TM's normal and nose and mouth without ulcers or lesions  RESP: lungs clear to auscultation - no rales, rhonchi or wheezes  CV: regular rate and rhythm, normal S1 S2, no S3 or S4 and no murmur, click or rub   ABDOMEN: soft, nontender, no HSM or masses and bowel sounds normal  NEURO: Normal strength and tone, sensory exam grossly normal, mentation intact and speech normal    DIAGNOSTICS:   No labs or EKG " required for low risk surgery (cataract, skin procedure, breast biopsy, etc)    Recent Labs   Lab Test 10/20/18  0648 10/19/18  0704 10/18/18  0636 10/17/18  0541 10/16/18  2158  12/26/17  1744 08/06/16  1101   HGB 11.8 11.0* 12.4 12.1 13.1   < > 10.9* 10.2*   PLT  --   --  392 400 436   < > 479* 439   INR  --   --  1.02  --   --   --   --   --    NA  --   --  138  --  137   < >  --  136   POTASSIUM  --   --  4.0  --  3.4   < >  --  3.7   CR  --   --  0.62  --  0.68   < >  --  0.64   A1C  --   --   --   --   --   --  5.4 5.5    < > = values in this interval not displayed.        IMPRESSION:   Reason for surgery/procedure: Hymenectomy and vaginal tissue removal  Diagnosis/reason for consult: preop    The proposed surgical procedure is considered INTERMEDIATE risk.    REVISED CARDIAC RISK INDEX  The patient has the following serious cardiovascular risks for perioperative complications such as (MI, PE, VFib and 3  AV Block):  No serious cardiac risks  INTERPRETATION: 0 risks: Class I (very low risk - 0.4% complication rate)    The patient has the following additional risks for perioperative complications:  No identified additional risks      ICD-10-CM    1. Preop general physical exam Z01.818        RECOMMENDATIONS:       Obstructive Sleep Apnea (or suspected sleep apnea)  Hospital staff are advised to monitor for sleep related oxygen desaturations due to suspicion of BARBER      --Patient is to take all scheduled medications on the day of surgery EXCEPT for modifications listed below.    APPROVAL GIVEN to proceed with proposed procedure, without further diagnostic evaluation       Signed Electronically by: Chase Villafuerte MD    Copy of this evaluation report is provided to requesting physician.    [facility] Preop Guidelines    Revised Cardiac Risk Index

## 2019-01-28 NOTE — NURSING NOTE
"Vital signs:  Temp: 98.1  F (36.7  C) Temp src: Oral BP: 120/81 Pulse: 87   Resp: 18 SpO2: 100 %     Height: 167.6 cm (5' 6\") Weight: 108.3 kg (238 lb 11.2 oz)  Estimated body mass index is 38.53 kg/m  as calculated from the following:    Height as of this encounter: 1.676 m (5' 6\").    Weight as of this encounter: 108.3 kg (238 lb 11.2 oz).        "

## 2019-01-29 LAB
DEPRECATED CALCIDIOL+CALCIFEROL SERPL-MC: 16 UG/L (ref 20–75)
FERRITIN SERPL-MCNC: 239 NG/ML (ref 12–150)
IRON SATN MFR SERPL: 8 % (ref 15–46)
IRON SERPL-MCNC: 27 UG/DL (ref 35–180)
TIBC SERPL-MCNC: 360 UG/DL (ref 240–430)

## 2019-02-06 ENCOUNTER — HOSPITAL ENCOUNTER (OUTPATIENT)
Facility: CLINIC | Age: 29
Discharge: HOME | End: 2019-02-06
Attending: OBSTETRICS & GYNECOLOGY | Admitting: OBSTETRICS & GYNECOLOGY
Payer: COMMERCIAL

## 2019-02-06 ENCOUNTER — ANESTHESIA EVENT (OUTPATIENT)
Dept: SURGERY | Facility: CLINIC | Age: 29
End: 2019-02-06
Payer: COMMERCIAL

## 2019-02-06 ENCOUNTER — ANESTHESIA (OUTPATIENT)
Dept: SURGERY | Facility: CLINIC | Age: 29
End: 2019-02-06
Payer: COMMERCIAL

## 2019-02-06 VITALS
WEIGHT: 247 LBS | SYSTOLIC BLOOD PRESSURE: 113 MMHG | TEMPERATURE: 98.1 F | HEIGHT: 66 IN | OXYGEN SATURATION: 98 % | DIASTOLIC BLOOD PRESSURE: 78 MMHG | HEART RATE: 89 BPM | RESPIRATION RATE: 16 BRPM | BODY MASS INDEX: 39.7 KG/M2

## 2019-02-06 LAB — HCG UR QL: NEGATIVE

## 2019-02-06 PROCEDURE — 25000128 H RX IP 250 OP 636: Performed by: NURSE ANESTHETIST, CERTIFIED REGISTERED

## 2019-02-06 PROCEDURE — 25000128 H RX IP 250 OP 636: Performed by: ANESTHESIOLOGY

## 2019-02-06 PROCEDURE — 71000013 ZZH RECOVERY PHASE 1 LEVEL 1 EA ADDTL HR: Performed by: OBSTETRICS & GYNECOLOGY

## 2019-02-06 PROCEDURE — 25000125 ZZHC RX 250: Performed by: ANESTHESIOLOGY

## 2019-02-06 PROCEDURE — 37000008 ZZH ANESTHESIA TECHNICAL FEE, 1ST 30 MIN: Performed by: OBSTETRICS & GYNECOLOGY

## 2019-02-06 PROCEDURE — 40000306 ZZH STATISTIC PRE PROC ASSESS II: Performed by: OBSTETRICS & GYNECOLOGY

## 2019-02-06 PROCEDURE — 71000012 ZZH RECOVERY PHASE 1 LEVEL 1 FIRST HR: Performed by: OBSTETRICS & GYNECOLOGY

## 2019-02-06 PROCEDURE — 25000125 ZZHC RX 250: Performed by: NURSE ANESTHETIST, CERTIFIED REGISTERED

## 2019-02-06 PROCEDURE — 25000125 ZZHC RX 250: Performed by: OBSTETRICS & GYNECOLOGY

## 2019-02-06 PROCEDURE — 81025 URINE PREGNANCY TEST: CPT | Performed by: OBSTETRICS & GYNECOLOGY

## 2019-02-06 PROCEDURE — 27210794 ZZH OR GENERAL SUPPLY STERILE: Performed by: OBSTETRICS & GYNECOLOGY

## 2019-02-06 PROCEDURE — 71000027 ZZH RECOVERY PHASE 2 EACH 15 MINS: Performed by: OBSTETRICS & GYNECOLOGY

## 2019-02-06 PROCEDURE — 36000058 ZZH SURGERY LEVEL 3 EA 15 ADDTL MIN: Performed by: OBSTETRICS & GYNECOLOGY

## 2019-02-06 PROCEDURE — 25000132 ZZH RX MED GY IP 250 OP 250 PS 637: Performed by: ANESTHESIOLOGY

## 2019-02-06 PROCEDURE — 25000132 ZZH RX MED GY IP 250 OP 250 PS 637: Performed by: OBSTETRICS & GYNECOLOGY

## 2019-02-06 PROCEDURE — 36000056 ZZH SURGERY LEVEL 3 1ST 30 MIN: Performed by: OBSTETRICS & GYNECOLOGY

## 2019-02-06 PROCEDURE — 58560 HYSTEROSCOPY RESECT SEPTUM: CPT | Performed by: OBSTETRICS & GYNECOLOGY

## 2019-02-06 PROCEDURE — 37000009 ZZH ANESTHESIA TECHNICAL FEE, EACH ADDTL 15 MIN: Performed by: OBSTETRICS & GYNECOLOGY

## 2019-02-06 RX ORDER — HYDROMORPHONE HYDROCHLORIDE 1 MG/ML
.3-.5 INJECTION, SOLUTION INTRAMUSCULAR; INTRAVENOUS; SUBCUTANEOUS EVERY 10 MIN PRN
Status: DISCONTINUED | OUTPATIENT
Start: 2019-02-06 | End: 2019-02-06 | Stop reason: HOSPADM

## 2019-02-06 RX ORDER — NALOXONE HYDROCHLORIDE 0.4 MG/ML
.1-.4 INJECTION, SOLUTION INTRAMUSCULAR; INTRAVENOUS; SUBCUTANEOUS
Status: DISCONTINUED | OUTPATIENT
Start: 2019-02-06 | End: 2019-02-06 | Stop reason: HOSPADM

## 2019-02-06 RX ORDER — FENTANYL CITRATE 50 UG/ML
25-50 INJECTION, SOLUTION INTRAMUSCULAR; INTRAVENOUS
Status: DISCONTINUED | OUTPATIENT
Start: 2019-02-06 | End: 2019-02-06 | Stop reason: HOSPADM

## 2019-02-06 RX ORDER — LIDOCAINE 40 MG/G
CREAM TOPICAL
Status: DISCONTINUED | OUTPATIENT
Start: 2019-02-06 | End: 2019-02-06 | Stop reason: HOSPADM

## 2019-02-06 RX ORDER — ONDANSETRON 4 MG/1
4 TABLET, ORALLY DISINTEGRATING ORAL EVERY 30 MIN PRN
Status: DISCONTINUED | OUTPATIENT
Start: 2019-02-06 | End: 2019-02-06 | Stop reason: HOSPADM

## 2019-02-06 RX ORDER — ONDANSETRON 2 MG/ML
INJECTION INTRAMUSCULAR; INTRAVENOUS PRN
Status: DISCONTINUED | OUTPATIENT
Start: 2019-02-06 | End: 2019-02-06

## 2019-02-06 RX ORDER — SODIUM CHLORIDE, SODIUM LACTATE, POTASSIUM CHLORIDE, CALCIUM CHLORIDE 600; 310; 30; 20 MG/100ML; MG/100ML; MG/100ML; MG/100ML
INJECTION, SOLUTION INTRAVENOUS CONTINUOUS
Status: DISCONTINUED | OUTPATIENT
Start: 2019-02-06 | End: 2019-02-06 | Stop reason: HOSPADM

## 2019-02-06 RX ORDER — PROPOFOL 10 MG/ML
INJECTION, EMULSION INTRAVENOUS PRN
Status: DISCONTINUED | OUTPATIENT
Start: 2019-02-06 | End: 2019-02-06

## 2019-02-06 RX ORDER — SCOLOPAMINE TRANSDERMAL SYSTEM 1 MG/1
1 PATCH, EXTENDED RELEASE TRANSDERMAL ONCE
Status: COMPLETED | OUTPATIENT
Start: 2019-02-06 | End: 2019-02-06

## 2019-02-06 RX ORDER — MEPERIDINE HYDROCHLORIDE 50 MG/ML
12.5 INJECTION INTRAMUSCULAR; INTRAVENOUS; SUBCUTANEOUS
Status: DISCONTINUED | OUTPATIENT
Start: 2019-02-06 | End: 2019-02-06 | Stop reason: HOSPADM

## 2019-02-06 RX ORDER — ALBUTEROL SULFATE 0.83 MG/ML
2.5 SOLUTION RESPIRATORY (INHALATION) EVERY 4 HOURS PRN
Status: DISCONTINUED | OUTPATIENT
Start: 2019-02-06 | End: 2019-02-06 | Stop reason: HOSPADM

## 2019-02-06 RX ORDER — DIPHENHYDRAMINE HYDROCHLORIDE 50 MG/ML
25 INJECTION INTRAMUSCULAR; INTRAVENOUS ONCE
Status: COMPLETED | OUTPATIENT
Start: 2019-02-06 | End: 2019-02-06

## 2019-02-06 RX ORDER — PHENAZOPYRIDINE HYDROCHLORIDE 200 MG/1
200 TABLET, FILM COATED ORAL ONCE
Status: COMPLETED | OUTPATIENT
Start: 2019-02-06 | End: 2019-02-06

## 2019-02-06 RX ORDER — HYDROCODONE BITARTRATE AND ACETAMINOPHEN 5; 325 MG/1; MG/1
1-2 TABLET ORAL ONCE
Status: COMPLETED | OUTPATIENT
Start: 2019-02-06 | End: 2019-02-06

## 2019-02-06 RX ORDER — FENTANYL CITRATE 50 UG/ML
INJECTION, SOLUTION INTRAMUSCULAR; INTRAVENOUS PRN
Status: DISCONTINUED | OUTPATIENT
Start: 2019-02-06 | End: 2019-02-06

## 2019-02-06 RX ORDER — DEXAMETHASONE SODIUM PHOSPHATE 4 MG/ML
INJECTION, SOLUTION INTRA-ARTICULAR; INTRALESIONAL; INTRAMUSCULAR; INTRAVENOUS; SOFT TISSUE PRN
Status: DISCONTINUED | OUTPATIENT
Start: 2019-02-06 | End: 2019-02-06

## 2019-02-06 RX ORDER — LABETALOL HYDROCHLORIDE 5 MG/ML
10 INJECTION, SOLUTION INTRAVENOUS EVERY 5 MIN PRN
Status: DISCONTINUED | OUTPATIENT
Start: 2019-02-06 | End: 2019-02-06 | Stop reason: HOSPADM

## 2019-02-06 RX ORDER — ONDANSETRON 2 MG/ML
4 INJECTION INTRAMUSCULAR; INTRAVENOUS EVERY 30 MIN PRN
Status: DISCONTINUED | OUTPATIENT
Start: 2019-02-06 | End: 2019-02-06 | Stop reason: HOSPADM

## 2019-02-06 RX ORDER — LIDOCAINE HYDROCHLORIDE 10 MG/ML
INJECTION, SOLUTION INFILTRATION; PERINEURAL PRN
Status: DISCONTINUED | OUTPATIENT
Start: 2019-02-06 | End: 2019-02-06

## 2019-02-06 RX ORDER — GLYCOPYRROLATE 0.2 MG/ML
INJECTION, SOLUTION INTRAMUSCULAR; INTRAVENOUS PRN
Status: DISCONTINUED | OUTPATIENT
Start: 2019-02-06 | End: 2019-02-06

## 2019-02-06 RX ORDER — PROPOFOL 10 MG/ML
INJECTION, EMULSION INTRAVENOUS CONTINUOUS PRN
Status: DISCONTINUED | OUTPATIENT
Start: 2019-02-06 | End: 2019-02-06

## 2019-02-06 RX ADMIN — PROPOFOL 50 MCG/KG/MIN: 10 INJECTION, EMULSION INTRAVENOUS at 13:16

## 2019-02-06 RX ADMIN — HYDROCODONE BITARTRATE AND ACETAMINOPHEN 1 TABLET: 5; 325 TABLET ORAL at 15:49

## 2019-02-06 RX ADMIN — PHENAZOPYRIDINE 200 MG: 200 TABLET ORAL at 11:36

## 2019-02-06 RX ADMIN — PROPOFOL 200 MG: 10 INJECTION, EMULSION INTRAVENOUS at 13:02

## 2019-02-06 RX ADMIN — FENTANYL CITRATE 50 MCG: 50 INJECTION, SOLUTION INTRAMUSCULAR; INTRAVENOUS at 13:19

## 2019-02-06 RX ADMIN — LIDOCAINE HYDROCHLORIDE 50 MG: 10 INJECTION, SOLUTION INFILTRATION; PERINEURAL at 13:02

## 2019-02-06 RX ADMIN — FENTANYL CITRATE 50 MCG: 50 INJECTION, SOLUTION INTRAMUSCULAR; INTRAVENOUS at 15:07

## 2019-02-06 RX ADMIN — FENTANYL CITRATE 50 MCG: 50 INJECTION, SOLUTION INTRAMUSCULAR; INTRAVENOUS at 13:31

## 2019-02-06 RX ADMIN — FENTANYL CITRATE 50 MCG: 50 INJECTION, SOLUTION INTRAMUSCULAR; INTRAVENOUS at 14:52

## 2019-02-06 RX ADMIN — DIPHENHYDRAMINE HYDROCHLORIDE 25 MG: 50 INJECTION INTRAMUSCULAR; INTRAVENOUS at 15:47

## 2019-02-06 RX ADMIN — SODIUM CHLORIDE, POTASSIUM CHLORIDE, SODIUM LACTATE AND CALCIUM CHLORIDE: 600; 310; 30; 20 INJECTION, SOLUTION INTRAVENOUS at 12:54

## 2019-02-06 RX ADMIN — MIDAZOLAM 2 MG: 1 INJECTION INTRAMUSCULAR; INTRAVENOUS at 12:54

## 2019-02-06 RX ADMIN — DEXAMETHASONE SODIUM PHOSPHATE 4 MG: 4 INJECTION, SOLUTION INTRA-ARTICULAR; INTRALESIONAL; INTRAMUSCULAR; INTRAVENOUS; SOFT TISSUE at 13:03

## 2019-02-06 RX ADMIN — FENTANYL CITRATE 50 MCG: 50 INJECTION, SOLUTION INTRAMUSCULAR; INTRAVENOUS at 14:16

## 2019-02-06 RX ADMIN — SCOPALAMINE 1 PATCH: 1 PATCH, EXTENDED RELEASE TRANSDERMAL at 11:59

## 2019-02-06 RX ADMIN — HYDROMORPHONE HYDROCHLORIDE 0.5 MG: 1 INJECTION, SOLUTION INTRAMUSCULAR; INTRAVENOUS; SUBCUTANEOUS at 14:34

## 2019-02-06 RX ADMIN — SODIUM CHLORIDE, POTASSIUM CHLORIDE, SODIUM LACTATE AND CALCIUM CHLORIDE: 600; 310; 30; 20 INJECTION, SOLUTION INTRAVENOUS at 13:35

## 2019-02-06 RX ADMIN — FENTANYL CITRATE 100 MCG: 50 INJECTION, SOLUTION INTRAMUSCULAR; INTRAVENOUS at 13:02

## 2019-02-06 RX ADMIN — ONDANSETRON 4 MG: 2 INJECTION INTRAMUSCULAR; INTRAVENOUS at 13:17

## 2019-02-06 RX ADMIN — FENTANYL CITRATE 50 MCG: 50 INJECTION, SOLUTION INTRAMUSCULAR; INTRAVENOUS at 14:05

## 2019-02-06 RX ADMIN — GLYCOPYRROLATE 0.2 MG: 0.2 INJECTION, SOLUTION INTRAMUSCULAR; INTRAVENOUS at 13:02

## 2019-02-06 ASSESSMENT — MIFFLIN-ST. JEOR: SCORE: 1867.13

## 2019-02-06 NOTE — ANESTHESIA POSTPROCEDURE EVALUATION
Patient: Karen Orellana    Procedure(s):  Exam under anesthesia,  , hysteroscopy, myosure for uterine septum resection  OPERATIVE HYSTEROSCOPY WITH MORCELLATOR  EXAM UNDER ANESTHESIA PELVIC    Diagnosis:Septat uterus, dyspareunia  Diagnosis Additional Information: uterine septum extending to the internal cervical os, resected    Anesthesia Type:  General, LMA    Note:  Anesthesia Post Evaluation    Patient location during evaluation: PACU  Patient participation: Able to fully participate in evaluation  Level of consciousness: awake and alert  Pain management: adequate  Airway patency: patent  Cardiovascular status: acceptable  Respiratory status: acceptable  Hydration status: acceptable  PONV: none     Anesthetic complications: None          Last vitals:  Vitals:    02/06/19 1412 02/06/19 1415 02/06/19 1424   BP: 115/76 126/79    Pulse: 96 90    Resp: 16 17 20   Temp:      SpO2: 97% 95% 100%         Electronically Signed By: Cam Holland MD  February 6, 2019  2:28 PM

## 2019-02-06 NOTE — OP NOTE
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  Risks, benefits and alternative treatments have been discussed with the patient. Informed consent obtained.     Procedure:   The patient was taken to the operating room where general anesthesia was administered. She was prepared and draped in the normal sterile fashion in the dorsal lithotomy position. EUA peformed showed retroverted uterus. A bivalve speculum was inserted in the vagina. A single tooth tenaculum was used to grasp the anterior lip of the cervix. The Hegar dilators were used to dilate the cervix to 7 mm. Uterus sounded to 8 cm. The hysteroscope was inserted and the uterine cavity explored. The above findings were noted.  Hysteroscope then removed and Myosure inserted. The septum was resected under direct visualization until both tubal ostia could be visualized in one field and until the anterior and posterior uterine walls were flush with the fundus when the uterus was distended with saline. Careful probing of the upper fundal region revealed no further obvious septal tissue. All instruments were then removed. Tenaculum sites appeared to be hemostatic with pressure and silver nitrate. The patient tolerated the procedure well and was transferred in stable condition to the PACU.     Jen Henry MD  February 6,  2019  1:40 PM

## 2019-02-06 NOTE — ANESTHESIA PREPROCEDURE EVALUATION
Anesthesia Pre-Procedure Evaluation    Patient: Karen Orellana   MRN: [id number] : 1990          Preoperative Diagnosis: Septat uterus, dyspareunia    Procedure(s):  Exam under anesthesia, possible hymenectomy, possible excision of vaginal septum, hysteroscopy, myosure for uterine septum  OPERATIVE HYSTEROSCOPY WITH MORCELLATOR  EXAM UNDER ANESTHESIA PELVIC    Past Medical History:   Diagnosis Date     Anemia     2/2 DUB-has required 2-3 transfusions     PCOS (polycystic ovarian syndrome)      PONV (postoperative nausea and vomiting)      Past Surgical History:   Procedure Laterality Date     IRRIGATION AND DEBRIDEMENT FINGER, COMBINED Left 10/18/2018    Procedure: 1.  Sharp incision and drainage, left index finger paronychia.  2.  Left index finger incision and drainage flexor tendon sheath. ;  Surgeon: Marco Tracy MD;  Location: [facility] OR     NO HISTORY OF SURGERY       Anesthesia Evaluation     .  Type: General    History of anesthetic complications   - PONV        ROS/MED HX    ENT/Pulmonary:  - neg pulmonary ROS     Neurologic:  - neg neurologic ROS     Cardiovascular:  - neg cardiovascular ROS       METS/Exercise Tolerance:     Hematologic:     (+) Anemia, -      Musculoskeletal:  - neg musculoskeletal ROS       GI/Hepatic:  - neg GI/hepatic ROS       Renal/Genitourinary:     (+) Other Renal/ Genitourinary, PCOD      Endo:     (+) Obesity, .      Psychiatric:  - neg psychiatric ROS       Infectious Disease:         Malignancy:      - no malignancy   Other:    - neg other ROS                      Physical Exam  Normal systems: cardiovascular and pulmonary    Airway   Mallampati: II  TM distance: >3 FB  Neck ROM: full    Dental   (+) chipped  Comment: Chipped #7    Cardiovascular       Pulmonary             Lab Results   Component Value Date    WBC 10.5 2019    HGB 12.9 2019    HCT 39.3 2019     2019     10/18/2018    POTASSIUM 4.0 10/18/2018    CHLORIDE  "106 10/18/2018    CO2 25 10/18/2018    BUN 10 10/18/2018    CR 0.62 10/18/2018    GLC 86 10/20/2018    LIBRA 8.2 (L) 10/18/2018    ALBUMIN 3.6 06/01/2018    PROTTOTAL 8.1 06/01/2018    ALT 31 06/01/2018    AST 20 06/01/2018    ALKPHOS 59 06/01/2018    BILITOTAL 0.3 06/01/2018    INR 1.02 10/18/2018    TSH 2.33 04/10/2018    HCG Negative 02/06/2019       Preop Vitals  BP Readings from Last 3 Encounters:   02/06/19 128/90   01/28/19 120/81   12/14/18 136/89    Pulse Readings from Last 3 Encounters:   02/06/19 89   01/28/19 87   12/14/18 97      Resp Readings from Last 3 Encounters:   02/06/19 20   01/28/19 18   10/25/18 20    SpO2 Readings from Last 3 Encounters:   02/06/19 100%   01/28/19 100%   10/25/18 98%      Temp Readings from Last 1 Encounters:   02/06/19 98.4  F (36.9  C) (Temporal)    Ht Readings from Last 1 Encounters:   02/06/19 1.676 m (5' 6\")      Wt Readings from Last 1 Encounters:   02/06/19 112 kg (247 lb)    Estimated body mass index is 39.87 kg/m  as calculated from the following:    Height as of this encounter: 1.676 m (5' 6\").    Weight as of this encounter: 112 kg (247 lb).       Anesthesia Plan      History & Physical Review  History and physical reviewed and following examination; no interval change.    ASA Status:  2 .    NPO Status:  > 8 hours    Plan for General and LMA with Intravenous and Propofol induction. Maintenance will be Balanced.    PONV prophylaxis:  Ondansetron (or other 5HT-3), Dexamethasone or Solumedrol and Scopolamine patch       Postoperative Care  Postoperative pain management:  IV analgesics and Oral pain medications.      Consents  Anesthetic plan, risks, benefits and alternatives discussed with:  Patient or representative and Patient..                 Cam Holland MD                    .  "

## 2019-02-06 NOTE — ANESTHESIA CARE TRANSFER NOTE
Patient: Karen Orellana    Procedure(s):  Exam under anesthesia, possible hymenectomy, possible excision of vaginal septum, hysteroscopy, myosure for uterine septum  OPERATIVE HYSTEROSCOPY WITH MORCELLATOR  EXAM UNDER ANESTHESIA PELVIC    Diagnosis: Septat uterus, dyspareunia  Diagnosis Additional Information: No value filed.    Anesthesia Type:   General, LMA     Note:  Airway :Face Mask  Patient transferred to:PACU  Handoff Report: Identifed the Patient, Identified the Reponsible Provider, Reviewed the pertinent medical history, Discussed the surgical course, Reviewed Intra-OP anesthesia mangement and issues during anesthesia, Set expectations for post-procedure period and Allowed opportunity for questions and acknowledgement of understanding      Vitals: (Last set prior to Anesthesia Care Transfer)    CRNA VITALS  2/6/2019 1314 - 2/6/2019 1349      2/6/2019             Pulse:  108    SpO2:  100 %    Resp Rate (observed):  14                Electronically Signed By: MARCELA Huang CRNA  February 6, 2019  1:49 PM

## 2019-02-06 NOTE — DISCHARGE INSTRUCTIONS
CHRISTINA MIGUEL MD     CLINIC PHONE NUMBER:  [phone]      GENERAL ANESTHESIA OR SEDATION ADULT DISCHARGE INSTRUCTIONS   SPECIAL PRECAUTIONS FOR 24 HOURS AFTER SURGERY    IT IS NOT UNUSUAL TO FEEL LIGHT-HEADED OR FAINT, UP TO 24 HOURS AFTER SURGERY OR WHILE TAKING PAIN MEDICATION.  IF YOU HAVE THESE SYMPTOMS; SIT FOR A FEW MINUTES BEFORE STANDING AND HAVE SOMEONE ASSIST YOU WHEN YOU GET UP TO WALK OR USE THE BATHROOM.    YOU SHOULD REST AND RELAX FOR THE NEXT 24 HOURS AND YOU MUST MAKE ARRANGEMENTS TO HAVE SOMEONE STAY WITH YOU FOR AT LEAST 24 HOURS AFTER YOUR DISCHARGE.  AVOID HAZARDOUS AND STRENUOUS ACTIVITIES.  DO NOT MAKE IMPORTANT DECISIONS FOR 24 HOURS.    DO NOT DRIVE ANY VEHICLE OR OPERATE MECHANICAL EQUIPMENT FOR 24 HOURS FOLLOWING THE END OF YOUR SURGERY.  EVEN THOUGH YOU MAY FEEL NORMAL, YOUR REACTIONS MAY BE AFFECTED BY THE MEDICATION YOU HAVE RECEIVED.    DO NOT DRINK ALCOHOLIC BEVERAGES FOR 24 HOURS FOLLOWING YOUR SURGERY.    DRINK CLEAR LIQUIDS (APPLE JUICE, GINGER ALE, 7-UP, BROTH, ETC.).  PROGRESS TO YOUR REGULAR DIET AS YOU FEEL ABLE.    YOU MAY HAVE A DRY MOUTH, A SORE THROAT, MUSCLES ACHES OR TROUBLE SLEEPING.  THESE SHOULD GO AWAY AFTER 24 HOURS.    CALL YOUR DOCTOR FOR ANY OF THE FOLLOWING:  SIGNS OF INFECTION (FEVER, GROWING TENDERNESS AT THE SURGERY SITE, A LARGE AMOUNT OF DRAINAGE OR BLEEDING, SEVERE PAIN, FOUL-SMELLING DRAINAGE, REDNESS OR SWELLING.    IT HAS BEEN OVER 8 TO 10 HOURS SINCE SURGERY AND YOU ARE STILL NOT ABLE TO URINATE (PASS WATER).        HYSTEROSCOPY  DISCHARGE INSTRUCTIONS        DO NOT DRIVE A CAR, DRINK ALCOHOL OR USE MACHINERY FOR THE NEXT 24 HOURS.  YOU SHOULD WAIT UNTIL YOU HAVE RECOVERED BEFORE MAKING ANY IMPORTANT DECISIONS.    PAIN  YOU MAY HAVE CRAMPS, SHOULDER PAIN OR A LOW BACKACHE FOR 24 TO 48 HOURS.  TYLENOL (ACETAMINOPHEN) OR MOTRIN (IBUPROFEN) MAY HELP, OR YOUR DOCTOR MAY GIVE YOU PAIN MEDICINE.  CALL YOUR DOCTOR IF PAIN CANNOT BE CONTROLLED.    BLEEDING  OR VAGINAL DISCHARGE  YOU MAY HAVE SOME BLEEDING OR DISCHARGE FOR UP TO A WEEK OR LONGER.  DO NOT DOUCHE, USE TAMPONS OR HAVE SEX (INTERCOURSE) FOR ______DAYS.  CALL YOUR DOCTOR IF YOU SOAK MORE THAN ONE MAXI PAD (SANITARY NAPKIN) PER HOUR, OR IF YOU PASS LARGE BLOOD CLOTS.    FEVER  YOU MAY HAVE A LOW FEVER FOR THE FIRST TWO DAYS.  CALL YOUR DOCTOR IF IT GOES OVER 101 DEGREES.    NAUSEA  IF YOU HAVE NAUSEA (FEEL SICK TO YOUR STOMACH), STAY IN BED.  TRY DRINKING A SMALL AMOUNT OF 7-UP, TEA OR SOUP.    SWOLLEN BELLY  IF YOUR ABDOMEN (BELLY AREA) FEELS FIRM OR SWOLLEN, CALL YOUR DOCTOR.    DIZZINESS AND WEAKNESS  YOU MAY FEEL DIZZY OR WEAK FOR A FEW DAYS.  IF SO, YOU SHOULD REST OFTEN, STAND UP SLOWLY AND USE CARE WHEN CLIMBING STAIRS.    DIET AND ACTIVITY  EAT LIGHT MEALS AND DRINK PLENTY OF FLUIDS FOR THE FIRST 24 HOURS (OR LONGER, IF YOU HAVE NAUSEA).  WAIT 5 DAYS BEFORE BATHING.  SHOWERS ARE OKAY.  MOST WOMEN CAN RETURN TO WORK AFTER 24 HOURS.  YOU MAY GO BACK TO YOUR OTHER ACTIVITIES AFTER YOUR PAIN GOES AWAY.      IF YOU HAVE STITCHES  YOU MAY REMOVE YOUR BANDAGE THE DAY AFTER TREATMENT.  YOUR DOCTOR WILL TELL YOU IF YOUR STITCHES NEED TO BE REMOVED.  SOME STITCHES DISSOLVE OVER TIME.      Transderm Scop (Scopolamine) Patch    The Transderm Scop patch placed behind your ear helps to prevent nausea and vomiting associated with the use of anesthesia and certain analgesics used during or after many types surgery.  You will need to remove this patch after 3 days.  However, it may be removed sooner if you are no longer concerned about nausea or vomiting.      The most common side effects:  ?  dryness of the mouth  ?  drowsiness  ?  blurred vision  ?  dilation of pupils  ?  disorientation, memory disturbances and/or confusion  ?  dizziness  ?  restlessness  ?  hallucinations  ?  difficulty urinating  ?  skin rash  ?  red or painful eyes    Avoid drinking alcohol while using Transderm Scop patch.  Be careful about  driving or operating any machinery while using this medication since it may make you drowsy.  In the unlikely event that you experience pain in the eye, which may be accompanied by widening of the pupil, eye redness and blurred vision, remove the Transderm Scop Patch immediately and consult your doctor.    Removal of Transderm Scop Patch:  To remove the patch simply peel it off your skin.  Be sure to wash your hands and the area behind your ear thoroughly with soap and water.  The Transderm Scop Patch will continue to have some active ingredient after use.  Therefore, to avoid accidental contact or ingestion by children or pets, fold the used patch in half with the sticky side together and dispose in the trash out of reach of children and pets.    NO sex, tampons or douching for 2 weeks.

## 2019-02-07 ENCOUNTER — TELEPHONE (OUTPATIENT)
Dept: OBGYN | Facility: CLINIC | Age: 29
End: 2019-02-07

## 2019-02-07 NOTE — TELEPHONE ENCOUNTER
Pt calls complaining of a swollen lump under her armpit and also under her breast (right side).  Pain radiates between these two bumps.  She has had this before.  NO fever.    They have gone away in the past on their own with heat pack, then they return after a few days to a week.    Pt said the pain is pretty severe when she gets these.  Rated at 7/10.  Hard to move her right arm right now.      Pt said she has discussed this with you in the past but has a flare up right now (has been happening more frequently lately) and wondered what your thoughts were.  We discussed lymph node irritation and to try not to touch these areas or wear tight clothing (such as bra). Advised I would forward on to Dr Henry to get her opinion.    I asked about recovery from surgery yesterday as well- she having some cramping, bleeding (light to moderate), and some burning when she urinates (dark yellow urine).  Not feeling urine frequency.  Advised to increase fluids.    Frances SHETTY R.N.  [facility][1[facility]

## 2019-02-08 NOTE — TELEPHONE ENCOUNTER
As long as she has no fever, no need to be seen. I would advise hot compresses several times a day. If pain is still significant, or if any redness, please call/send in clindamycin 300 mg po tid for 7 days. Thanks.    Jen Henry MD

## 2019-02-08 NOTE — TELEPHONE ENCOUNTER
Mom called, wanted to schedule an appt for a possible cyst in the breast area. Advised that Carl was in AV and was significantly overbooked. Offered appt with another provider on another day or location, declined. Declined speaking to triage. Given AV phone number as requested. Mom also questioned turnaround time on call backs, advised 24-48 hour policy.

## 2019-02-08 NOTE — TELEPHONE ENCOUNTER
Spoke to pt, advised to try hot compresses several times a day.  Call with fever, redness, or or if pain is still significant.  Then we will send clindamycin 300mg tid for 7 days per Dr Henry.  Pt understands plan.    Frances SHETTY R.N.  [facility]

## 2019-03-05 DIAGNOSIS — E55.9 VITAMIN D DEFICIENCY: ICD-10-CM

## 2019-03-05 NOTE — TELEPHONE ENCOUNTER
"Requested Prescriptions   Pending Prescriptions Disp Refills     vitamin D3 (CHOLECALCIFEROL) 78697 units capsule  Last Written Prescription Date:  11/01/18  Last Fill Quantity: 8,  # refills: 1   Last office visit: 1/28/2019 with prescribing provider:  01/28/19   Future Office Visit:   Next 5 appointments (look out 90 days)    Mar 08, 2019  3:00 PM CST  SHORT with Jen Henry MD  [facility] ([address] [address] [phone]          8 capsule 1     Sig: Take 1 capsule (50,000 Units) by mouth once a week    Vitamin Supplements (Adult) Protocol Failed - 3/5/2019  2:04 PM       Failed - High dose Vitamin D not ordered       Passed - Recent (12 mo) or future (30 days) visit within the authorizing provider's specialty    Patient had office visit in the last 12 months or has a visit in the next 30 days with authorizing provider or within the authorizing provider's specialty.  See \"Patient Info\" tab in inbasket, or \"Choose Columns\" in Meds & Orders section of the refill encounter.             Passed - Medication is active on med list          "

## 2019-03-08 ENCOUNTER — OFFICE VISIT (OUTPATIENT)
Dept: OBGYN | Facility: CLINIC | Age: 29
End: 2019-03-08
Payer: COMMERCIAL

## 2019-03-08 VITALS
BODY MASS INDEX: 38.25 KG/M2 | HEIGHT: 66 IN | WEIGHT: 238 LBS | DIASTOLIC BLOOD PRESSURE: 78 MMHG | HEART RATE: 79 BPM | SYSTOLIC BLOOD PRESSURE: 124 MMHG

## 2019-03-08 DIAGNOSIS — N92.6 IRREGULAR BLEEDING: ICD-10-CM

## 2019-03-08 DIAGNOSIS — L73.2 HIDRADENITIS SUPPURATIVA: Primary | ICD-10-CM

## 2019-03-08 PROCEDURE — 99214 OFFICE O/P EST MOD 30 MIN: CPT | Performed by: OBSTETRICS & GYNECOLOGY

## 2019-03-08 RX ORDER — CEPHALEXIN 500 MG/1
500 CAPSULE ORAL 4 TIMES DAILY
Qty: 28 CAPSULE | Refills: 0 | Status: SHIPPED | OUTPATIENT
Start: 2019-03-08 | End: 2019-03-15

## 2019-03-08 RX ORDER — CLINDAMYCIN PHOSPHATE 11.9 MG/ML
SOLUTION TOPICAL 2 TIMES DAILY
Qty: 60 ML | Refills: 11 | Status: SHIPPED | OUTPATIENT
Start: 2019-03-08 | End: 2020-01-09

## 2019-03-08 ASSESSMENT — MIFFLIN-ST. JEOR: SCORE: 1826.31

## 2019-03-08 NOTE — PATIENT INSTRUCTIONS
"What is hidradenitis suppurativa?     Hidradenitis suppurativa is a condition that causes red, swollen, painful bumps to form on the body, usually in places where the skin rubs together. These bumps can cause so much pain that they make it hard to move. They can smell bad or drain pus or blood. The bumps also tend to linger for weeks or months and keep coming back.  People who have hidradenitis suppurativa, also called \"HS,\" often have a hard time dealing with their problem. It can make them feel embarrassed and worried. Sometimes the condition can even cause problems in relationships, or in the workplace. If you have this problem, see a doctor or nurse. There are treatments that can help you.    Symptoms:   - red, swollen, painful bumps. These bumps can drain pus or blood.  The bumps usually form in places where the skin rubs together. Common locations include:  ?Armpits  ?On or under the breasts (in women)  ?In the groin area  ?Inner thighs  ?Buttocks  ?Around or near the anus  The skin problems caused by HS last a long time and get worse over time. Often the skin hardens and scars around the painful bumps. Plus, many bumps can form in a single area and sometimes form tunnels under the skin.    If you have symptoms of HS, see a doctor or nurse. He or she can look at your skin and find out if HS is the cause of your symptoms. Make sure you tell the doctor or nurse if you feel sad or embarrassed because of your symptoms. The doctor or nurse can help you deal with these problems.    Treatment can include:  ?Antibiotic liquids or gels that you put on the affected areas  ?Antibiotic pills, which you might need to take for a long time  ?Injections of steroid medicines into affected areas to bring down inflammation  Women with HS sometimes also take hormone treatments to improve their condition. This usually involves taking a birth control pill every day. Sometimes women take other types of hormone medicines, " "too.  People with severe, long-lasting problems can have surgery that helps HS to heal. They can also get special medicines that can reduce inflammation in the skin.    You should know that you did not do anything to cause your condition. It is not your fault. You did not cause it by being unclean. You should also know that you cannot spread your condition to anyone else. It is not \"contagious.\"  Here are some things you can do to reduce your symptoms:  ?Do not wear tight-fitting clothes  ?Try to avoid activities that cause your skin to rub against itself a lot  ?Shower every day and wash areas of HS gently with your fingers. Do not scrub affected areas with a washcloth, loofah, or brush.  ?Stop smoking, if you smoke. People who smoke are more likely to have HS  ?Lose weight, if you are overweight. HS is more common and more severe in people who are overweight.    This topic retrieved from [employer] on:Apr 19, 2017.    "

## 2019-03-08 NOTE — LETTER
[facility]  [address] [facility] [id number] [phone]          March 8, 2019    RE:  Karen Orellana                                                                                                                                                       [address] [facility] [address]            To whom it may concern:    Karen Orellana is under my professional care and had a follow up medical appointment on this date.    Sincerely,        Jen Henry MD

## 2019-03-08 NOTE — PROGRESS NOTES
SUBJECTIVE:                                                   Karen Orellana is a 28 year old female who presents to clinic today to follow up for uterine septum removal. Please see my last note for complete details. In addition, she continues to have irregular bleeding and increased vaginal discharge (without symptoms of infection) and would like to discuss recurrent episodes of hidradenitis suppurativa.      Problem list and histories reviewed & adjusted, as indicated.  Additional history: as documented.    Patient Active Problem List   Diagnosis     Iron deficiency anemia     CARDIOVASCULAR SCREENING; LDL GOAL LESS THAN 160     Morbid obesity due to excess calories (H)     Migraine without aura and without status migrainosus, not intractable     Iron deficiency anemia due to chronic blood loss     Perionychia of finger, left     Past Surgical History:   Procedure Laterality Date     EXAM UNDER ANESTHESIA PELVIC N/A 2/6/2019    Procedure: EXAM UNDER ANESTHESIA PELVIC;  Surgeon: Jen Henry MD;  Location: [facility] OR     IRRIGATION AND DEBRIDEMENT FINGER, COMBINED Left 10/18/2018    Procedure: 1.  Sharp incision and drainage, left index finger paronychia.  2.  Left index finger incision and drainage flexor tendon sheath. ;  Surgeon: Marco Tracy MD;  Location: [facility] OR     NO HISTORY OF SURGERY       OPERATIVE HYSTEROSCOPY WITH MORCELLATOR N/A 2/6/2019    Procedure: Exam under Anesthsia, Hysteroscopy, resection of uterine septum with Myosure;  Surgeon: Jen Henry MD;  Location: [facility] OR      Social History     Tobacco Use     Smoking status: Never Smoker     Smokeless tobacco: Never Used   Substance Use Topics     Alcohol use: Yes     Comment: 1-2 times per week      Problem (# of Occurrences) Relation (Name,Age of Onset)    Asthma (1) Mother    Breast Cancer (1) Maternal Grandmother    Diabetes (5) Father, Paternal Grandmother, Paternal Grandfather, Maternal Uncle, Maternal Aunt    Hypertension  (2) Mother, Maternal Grandmother    Hypoglycemia (1) Mother              Current Outpatient Medications on File Prior to Visit:  acetaminophen (TYLENOL) 325 MG tablet Take 2 tablets (650 mg) by mouth every 4 hours as needed for mild pain   diphenhydrAMINE (BENADRYL) 25 MG capsule Take 1 capsule (25 mg) by mouth every 6 hours as needed for itching   ibuprofen (ADVIL/MOTRIN) 200 MG tablet Take 3 tablets (600 mg) by mouth every 6 hours as needed for moderate pain   norethindrone-ethinyl estradiol-iron (MICROGESTIN FE1.5/30) 1.5-30 MG-MCG per tablet Take 1 tablet by mouth daily   vitamin D3 (CHOLECALCIFEROL) 45864 units capsule Take 1 capsule (50,000 Units) by mouth once a week     No current facility-administered medications on file prior to visit.   Allergies   Allergen Reactions     No Known Drug Allergies        ROS:  General: No change in weight, sleep or appetite.  Normal energy.  No fever or chills  : POSITIVE for:, irregular bleeding, clots without heavy bleeding otherwise   Skin: POSITIVE for:, recurrent painful cysts in the groin, under breasts, in axillae consistent with hidradenitis suppurativa     OBJECTIVE:     Constitutional:  Appearance: Well nourished, well developed alert, in no acute distress  Chest:  Respiratory Effort:  Breathing unlabored  Cardiovascular: no edema.  Gastrointestinal:  Abdominal Examination:  Abdomen nontender to palpation, tone normal without rigidity or guarding, no masses present, umbilicus without lesions; Liver/Spleen:  No hepatomegaly present, liver nontender to palpation; Hernias:  No hernias present  Neurologic/Psychiatric:  Mental Status:  Oriented X3   Pelvic Exam:  External Genitalia:     Normal appearance for age, no discharge present, no tenderness present, no inflammatory lesions present, color normal other than a cystic area on the mons consistent with hidradenitis suppurativa, with associated mild cellulitis. Several other lesions in various stages of healing  noted.  Perineum:     Perineum within normal limits, no evidence of trauma, no rashes or skin lesions present  Anus:     Anus within normal limits, no hemorrhoids present  Inguinal Lymph Nodes:     No lymphadenopathy present  Pubic Hair:     Normal pubic hair distribution for age  Genitalia and Groin:     No rashes present, no lesions present, no areas of discoloration, no masses present        In-Clinic Test Results:  No results found for this or any previous visit (from the past 24 hour(s)).    ASSESSMENT/PLAN:                                                        ICD-10-CM    1. Hidradenitis suppurativa L73.2 clindamycin (CLEOCIN T) 1 % external solution     cephALEXin (KEFLEX) 500 MG capsule   2. Irregular bleeding N92.6          Hidradenitis suppurativa is a condition of the skin that often affects the axillary region, the groin, and under the breasts. It can cluster in families. Treatment can include:  ?Antibiotic liquids or gels that you put on the affected areas  ?Antibiotic pills, which you might need to take for a long time  ?Injections of steroid medicines into affected areas to bring down inflammation  Women with HS sometimes also take hormone treatments to improve their condition. This usually involves taking a birth control pill every day. Sometimes women take other types of hormone medicines, too.    To reduce the chances of flares:  ?Do not wear tight-fitting clothes  ?Try to avoid activities that cause your skin to rub against itself a lot  ?Shower every day and wash areas of HS gently with your fingers. Do not scrub affected areas with a washcloth, loofah, or brush.  ?Stop smoking, if you smoke. People who smoke are more likely to have HS  ?Lose weight, if you are overweight. HS is more common and more severe in people who are overweight.    The plan today is to treat with clindamycin gel and also currently with Keflex for cellulitis in the mons. If worsening, call. Follow up as needed or in 3  months. Consider dermatology referral if clindamycin suppression does not reduce frequency/severity of outbreaks.    For irregular bleeding: she admits that she misses at least 2 oral contraceptive pills a week. This is likely why she continues to pass small blood clots erratically. Reinforced that she either needs to take this daily and reliably, consider a switch to the Nuvaring, or consider long-acting reversible contraception such as IUD or Nexplanon. She will try to take daily and see if symptoms improve.      Jen Henry MD  [facility]

## 2019-03-08 NOTE — NURSING NOTE
"Chief Complaint   Patient presents with     Surgical Followup       Initial /78   Pulse 79   Ht 1.676 m (5' 6\")   Wt 108 kg (238 lb)   Breastfeeding? No   BMI 38.41 kg/m   Estimated body mass index is 38.41 kg/m  as calculated from the following:    Height as of this encounter: 1.676 m (5' 6\").    Weight as of this encounter: 108 kg (238 lb).  BP completed using cuff size: large    Questioned patient about current smoking habits.  Pt. has never smoked.          The following HM Due: NONE      The following patient reported/Care Every where data was sent to:  P ABSTRACT QUALITY INITIATIVES [[id number]]  Meg Spring LPN             "

## 2019-06-21 NOTE — PROGRESS NOTES
[facility]    Hospitalist Progress Note      Assessment & Plan   Karen Orellana is a 28 year old female with PMH of PCOS, and DUB causing blood loss anemia that has required periodic transfusions who presents with Left hand 2nd finger nail perionychia with possible felon after having her nails done approximately 10 days ago    Left finger paronychia and left index finger flexor tenosynovitis s/p I & D on 10/18/19  Unsuccessful attempt for I & D in the ED.  Initially received Ceftriaxone/vanc. This was discontinued and changed to Clindamycin.   - see op note for detail  - f/u op cultures  - continue with IV Clinda, possible to PO tomorrow  - ortho following, appreciate help  - pain control with motrin prn, oxy prn also available      DVT Prophylaxis: Ambulate every shift  Code Status: Full Code    Disposition: Expected discharge in likely tomorrow with oral antibiotics.       Chaitanya Fisher MD  Text Page  (7am to 6pm)    Interval History   Had I & D this am. Feels tired this afternoon.   Pain in her finger which is controlled with oxycodone/motrin.   She is afebrile. Denies nausea.     -Data reviewed today: I reviewed all new labs and imaging results over the last 24 hours. I personally reviewed no images or EKG's today.    Physical Exam   Temp: 97.8  F (36.6  C) Temp src: Oral BP: 105/61   Heart Rate: 72 Resp: 16 SpO2: 98 % O2 Device: None (Room air) Oxygen Delivery: 2 LPM  Vitals:    10/16/18 2303 10/17/18 0116   Weight: 106.1 kg (234 lb) 105.2 kg (232 lb)     Vital Signs with Ranges  Temp:  [97.2  F (36.2  C)-98.1  F (36.7  C)] 97.8  F (36.6  C)  Heart Rate:  [58-94] 72  Resp:  [7-22] 16  BP: (102-137)/(61-97) 105/61  Cuff Mean (mmHg):  [] 78  FiO2 (%):  [100 %] 100 %  SpO2:  [93 %-100 %] 98 %  I/O last 3 completed shifts:  In: 1120 [P.O.:720; I.V.:400]  Out: -     Constitutional: Alert and awake, no apparent distress  Respiratory: Cleat to auscultation  bilaterally  Cardiovascular: regular rate and rhythm  GI: soft and non-tender  Skin/Integumen: warm and dry  Other:  left index finger wrapped.     Medications     dextrose 5% and 0.9% NaCl 75 mL/hr at 10/18/18 1011       ceFAZolin  1 g Intravenous See Admin Instructions     clindamycin  900 mg Intravenous Q8H     lidocaine  5 mL Infiltration Once     lidocaine/EPINEPHrine/tetracaine  3 mL Topical Once     norethindrone-ethinyl estradiol-iron  1 tablet Oral Daily     sodium chloride (PF)  3 mL Intracatheter Q8H     sodium chloride (PF)  3 mL Intracatheter Q8H       Data     Recent Labs  Lab 10/18/18  0636 10/17/18  0541 10/16/18  2158   WBC 9.4 10.8 11.6*   HGB 12.4 12.1 13.1   MCV 89 89 89    400 436   INR 1.02  --   --      --  137   POTASSIUM 4.0  --  3.4   CHLORIDE 106  --  104   CO2 25  --  26   BUN 10  --  9   CR 0.62  --  0.68   ANIONGAP 7  --  7   LIBRA 8.2*  --  8.9   GLC 93  --  86       No results found for this or any previous visit (from the past 24 hour(s)).     never used

## 2019-12-15 ENCOUNTER — HEALTH MAINTENANCE LETTER (OUTPATIENT)
Age: 29
End: 2019-12-15

## 2020-01-06 DIAGNOSIS — N92.0 EXCESSIVE OR FREQUENT MENSTRUATION: ICD-10-CM

## 2020-01-06 RX ORDER — NORETHINDRONE ACETATE AND ETHINYL ESTRADIOL 1.5-30(21)
1 KIT ORAL DAILY
Qty: 84 TABLET | Refills: 3 | Status: CANCELLED | OUTPATIENT
Start: 2020-01-06

## 2020-01-06 NOTE — TELEPHONE ENCOUNTER
"Requested Prescriptions   Pending Prescriptions Disp Refills     norethindrone-ethinyl estradiol-iron (MICROGESTIN FE1.5/30) 1.5-30 MG-MCG tablet 84 tablet 3     Sig: Take 1 tablet by mouth daily   Last Written Prescription Date:  10/25/2018  Last Fill Quantity: 84,  # refills: 3   Last office visit: 1/28/2019 with prescribing provider:     Future Office Visit:   Next 5 appointments (look out 90 days)    Feb 14, 2020 10:00 AM CST  [facility] [address] [place] [id number] [phone]           Contraceptives Protocol Passed - 1/6/2020  3:25 PM        Passed - Patient is not a current smoker if age is 35 or older        Passed - Recent (12 mo) or future (30 days) visit within the authorizing provider's specialty     Patient has had an office visit with the authorizing provider or a provider within the authorizing providers department within the previous 12 mos or has a future within next 30 days. See \"Patient Info\" tab in inbasket, or \"Choose Columns\" in Meds & Orders section of the refill encounter.              Passed - Medication is active on med list        Passed - No active pregnancy on record        Passed - No positive pregnancy test in past 12 months        "

## 2020-01-08 RX ORDER — NORETHINDRONE ACETATE AND ETHINYL ESTRADIOL 1.5-30(21)
1 KIT ORAL DAILY
Qty: 84 TABLET | Refills: 0 | Status: SHIPPED | OUTPATIENT
Start: 2020-01-08 | End: 2020-01-09

## 2020-01-09 ENCOUNTER — APPOINTMENT (OUTPATIENT)
Dept: GENERAL RADIOLOGY | Facility: CLINIC | Age: 30
End: 2020-01-09
Attending: EMERGENCY MEDICINE
Payer: COMMERCIAL

## 2020-01-09 ENCOUNTER — HOSPITAL ENCOUNTER (EMERGENCY)
Facility: CLINIC | Age: 30
Discharge: HOME | End: 2020-01-09
Attending: EMERGENCY MEDICINE | Admitting: EMERGENCY MEDICINE
Payer: COMMERCIAL

## 2020-01-09 VITALS
DIASTOLIC BLOOD PRESSURE: 97 MMHG | HEART RATE: 83 BPM | OXYGEN SATURATION: 100 % | RESPIRATION RATE: 16 BRPM | SYSTOLIC BLOOD PRESSURE: 154 MMHG | TEMPERATURE: 98 F

## 2020-01-09 DIAGNOSIS — S93.402A SPRAIN OF LEFT ANKLE, UNSPECIFIED LIGAMENT, INITIAL ENCOUNTER: ICD-10-CM

## 2020-01-09 PROCEDURE — 99283 EMERGENCY DEPT VISIT LOW MDM: CPT

## 2020-01-09 PROCEDURE — 25000132 ZZH RX MED GY IP 250 OP 250 PS 637: Performed by: EMERGENCY MEDICINE

## 2020-01-09 PROCEDURE — 73610 X-RAY EXAM OF ANKLE: CPT | Mod: LT

## 2020-01-09 RX ORDER — ACETAMINOPHEN 500 MG
1000 TABLET ORAL ONCE
Status: COMPLETED | OUTPATIENT
Start: 2020-01-09 | End: 2020-01-09

## 2020-01-09 RX ORDER — IBUPROFEN 600 MG/1
600 TABLET, FILM COATED ORAL ONCE
Status: COMPLETED | OUTPATIENT
Start: 2020-01-09 | End: 2020-01-09

## 2020-01-09 RX ADMIN — ACETAMINOPHEN 1000 MG: 500 TABLET, FILM COATED ORAL at 17:56

## 2020-01-09 RX ADMIN — IBUPROFEN 600 MG: 600 TABLET, FILM COATED ORAL at 17:54

## 2020-01-09 ASSESSMENT — ENCOUNTER SYMPTOMS
CONSTITUTIONAL NEGATIVE: 1
NUMBNESS: 0

## 2020-01-09 NOTE — ED PROVIDER NOTES
History     Chief Complaint:  Fall    HPI   Karen Orellana is a 29 year old female who presents for evaluation after a fall. 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 She reports notes mild soreness of the knee after hitting it on the ground, but otherwise denies hitting her head or any other injuries. She also denies numbness and is able to move her toes. No previous injury to the left ankle.    Allergies:  NDKA     Medications:    Benadryl  Microgestin FE  Vitamin D     Past Medical History:    Anemia  Polycystic ovarian syndrome  Migraine     Past Surgical History:    Noncontributory    Social History:  The patient was unaccompanied to the ED.    Review of Systems   Constitutional: Negative.    Musculoskeletal:        Left ankle pain   Skin: Negative.    Neurological: Negative for numbness.     Physical Exam     Patient Vitals for the past 24 hrs:   BP Temp Temp src Heart Rate Resp SpO2   01/09/20 1734 (!) 146/103 98  F (36.7  C) Oral 75 18 100 %        Physical Exam  General: Adult female sitting upright  CV: 2+ DP and PT pulses left foot and ankle  MSK: Mild soft tissue swelling and tendnerness just adjacent to the left lateral malleolus. No palpable crepitus or bony deformity. Nontender over the remainder of the left lower extremity including foot.  Skin: Warm and dry. No rashes or lesions or ecchymoses on visible skin.  Neuro: Alert and oriented. Responds appropriately to all questions and commands. No focal findings appreciated. Sensation intact to light touch the left foot and toe webs.  Psych: Normal mood and affect. Pleasant.    Emergency Department Course       Imaging:  Radiographic findings were communicated with the patient who voiced understanding of the findings.    XR Ankle Left G/E 3 Views  IMPRESSION: Normal joint spaces and alignment. No fracture. Reading per radiology.      Interventions:  1754 Ibuprofen 600 mg PO  1756 Tylenol  1000 mg PO    Emergency Department Course:   Past medical records, nursing notes, and vitals reviewed.  1750: I performed an exam of the patient and obtained history, as documented above.    The patient was sent for a XR Ankle Left while in the emergency department, results above.      Medication administered.     Findings and plan explained to the Patient. Patient discharged home with instructions regarding supportive care, medications, and reasons to return. The importance of close follow-up was reviewed.      Impression & Plan        Medical Decision Making:    Karen Orellana is a 29 year old female who presents for evaluation of ankle pain after a mechanical fall.  Signs and symptoms are consistent with an ankle sprain.   No signs of septic arthritis, gout, pseudogout, fracture, cellulitis, etc.  There are no signs of fracture on today's xray.  The patients neurovascular status is normal. Plan is for protected weightbearing, RICE treatment with ice 15 minutes on, 1 hour off, ankle airbrace.  Patient will advance weightbearing and follow-up with primary and/or orthopedics in 3-5 days.  Return to ED as needed with worsening symptoms.    Diagnosis:    ICD-10-CM    1. Sprain of left ankle, unspecified ligament, initial encounter S93.402A        Disposition:  discharged to home      IClara, am serving as a scribe on 1/9/2020 at 5:50 PM to personally document services performed by Sophie Vickers MD based on my observations and the provider's statements to me.      Clara Martinez  1/9/2020   [facility] EMERGENCY DEPARTMENT       Sophie Vickers MD  01/09/20 2044

## 2020-01-09 NOTE — ED AVS SNAPSHOT
[facility] Emergency Department  [address]  [facility] [address]  Phone:  [phone]  Fax:  [phone]                                    Karen Orellana   MRN: [id number]    Department:  [facility] Emergency Department   Date of Visit:  1/9/2020           After Visit Summary Signature Page    I have received my discharge instructions, and my questions have been answered. I have discussed any challenges I see with this plan with the nurse or doctor.    ..........................................................................................................................................  Patient/Patient Representative Signature      ..........................................................................................................................................  Patient Representative Print Name and Relationship to Patient    ..................................................               ................................................  Date                                   Time    ..........................................................................................................................................  Reviewed by Signature/Title    ...................................................              ..............................................  Date                                               Time          22EPIC Rev 08/18

## 2020-01-10 NOTE — DISCHARGE INSTRUCTIONS
Discharge Instructions  Ankle Sprain    An ankle sprain is a stretching or tearing of a ligament around your ankle joint. In most cases, we recommend resting the ankle for about 3 days, followed by return to activity. Some severe sprains need longer periods of rest, or can require a cast or boot to immobilize them.    Generally, every Emergency Department visit should have a follow-up clinic visit with either a primary or a specialty clinic/provider. Please follow-up as instructed by your emergency provider today.    Return to the Emergency Department if:  Your pain is much worse, or if there is pain in a new area.  Your foot or leg becomes pale, cool, blue, or numb or tingling.  There is anything concerning to you about how your ankle looks.  Any splint or device is feeling too tight, causing pain, or rubbing into your skin.    Follow-up with your provider:  As recommended by your emergency provider.  If your ankle is not back to normal within about 1 week.  If you are involved in significant athletic activities.        Treatment:  Apply ice your injured area for 15 minutes at a time, at least 3 times a day for the first 1-2 days. Use a cloth between the ice bag and your skin to prevent frostbite.   Do not sleep with an ice pack or heating pad on, since this can cause burns or skin injury.  Raise the injured area above the level of your heart as much as possible in the first 1-2 days.  Pain medications -- You may take a pain medication such as Tylenol  (acetaminophen), Advil , Nuprin  (ibuprofen) or Aleve  (naproxen).  Splint. We often give a stirrup-shaped ankle splint to support your ankle and prevent it from turning again. Wear this all the time for the first 3-5 days, and then as directed by your provider.  Crutches. If you cannot put weight on the ankle without a lot of pain, we recommend crutches. You can put as much weight on the ankle as possible without severe pain.   Compression. An elastic bandage (Ace   wrap) can help with pain and swelling. Remove this at least twice a day, and leave it off for several hours if you develop swelling of the foot.   Exercises.  Movements, like rotating the foot in circles, should be started when swelling improves.   If you were given a prescription for medicine here today, be sure to read all of the information (including the package insert) that comes with your prescription.  This will include important information about the medicine, its side effects, and any warnings that you need to know about.  The pharmacist who fills the prescription can provide more information and answer questions you may have about the medicine.  If you have questions or concerns that the pharmacist cannot address, please call or return to the Emergency Department.  Remember that you can always come back to the Emergency Department if you are not able to see your regular provider in the amount of time listed above, if you get any new symptoms, or if there is anything that worries you.

## 2020-02-14 ENCOUNTER — OFFICE VISIT (OUTPATIENT)
Dept: OBGYN | Facility: CLINIC | Age: 30
End: 2020-02-14
Payer: COMMERCIAL

## 2020-02-14 VITALS
SYSTOLIC BLOOD PRESSURE: 114 MMHG | BODY MASS INDEX: 37.61 KG/M2 | HEIGHT: 66 IN | DIASTOLIC BLOOD PRESSURE: 80 MMHG | HEART RATE: 78 BPM | WEIGHT: 234 LBS

## 2020-02-14 DIAGNOSIS — Z01.419 ENCOUNTER FOR ANNUAL ROUTINE GYNECOLOGICAL EXAMINATION: ICD-10-CM

## 2020-02-14 DIAGNOSIS — Z31.69 ENCOUNTER FOR PRECONCEPTION CONSULTATION: ICD-10-CM

## 2020-02-14 DIAGNOSIS — N94.2 VAGINISMUS: Primary | ICD-10-CM

## 2020-02-14 PROCEDURE — 99395 PREV VISIT EST AGE 18-39: CPT | Performed by: OBSTETRICS & GYNECOLOGY

## 2020-02-14 ASSESSMENT — MIFFLIN-ST. JEOR: SCORE: 1803.17

## 2020-02-14 NOTE — NURSING NOTE
"Chief Complaint   Patient presents with     Physical       Initial /80   Pulse 78   Ht 1.676 m (5' 6\")   Wt 106.1 kg (234 lb)   LMP 2020   Breastfeeding No   BMI 37.77 kg/m   Estimated body mass index is 37.77 kg/m  as calculated from the following:    Height as of this encounter: 1.676 m (5' 6\").    Weight as of this encounter: 106.1 kg (234 lb).  BP completed using cuff size: large    Questioned patient about current smoking habits.  Pt. has never smoked.          The following HM Due: NONE      The following patient reported/Care Every where data was sent to:  P ABSTRACT QUALITY INITIATIVES [[id number]]  Meg Spring LPN               "

## 2020-02-14 NOTE — PROGRESS NOTES
"SUBJECTIVE:                                                      Karen is a 29 year old  female who presents for annual exam.     Patient's last menstrual period was 2020.. Menses are regular q 28-30 days and normal lasting 3 days.  Using none for contraception.  She is currently considering pregnancy. Just got engaged, and they decided to stop using birth control and would be happy to be pregnant prior to a wedding. She is not taking a prenatal vitamin.   Besides routine health maintenance,  she would like to discuss pain with intercourse. This has been a long term issue, but until recently, her boyfriend was not local and so she was not having intercourse often and was not interested in pursuing treatment. She is now interested in exploring options.  Pain with intercourse is:  Sharp, insertional only. Feels like \"everything gets too tight and won't relax.\" after initial insertion, she is able to relax a bit and pain dissipates. Also has history of intermittent urinary incontinence.  GYNECOLOGIC HISTORY:    Karen is sexually active with 1 male partner(s) and is currently in a monogamous relationship.      History sexually transmitted infections:No STD history    History of abnormal Pap smear: NO - age 30- 65 PAP every 3 years recommended  Family history of breast CA: Yes (Please explain): tamiko BUCKLEY  Family history of uterine/ovarian CA: No    Family history of colon CA: No      HISTORY:  OB History    Para Term  AB Living   0 0 0 0 0 0   SAB TAB Ectopic Multiple Live Births   0 0 0 0 0     Past Medical History:   Diagnosis Date     Anemia     2/2 DUB-has required 2-3 transfusions     PCOS (polycystic ovarian syndrome)      PONV (postoperative nausea and vomiting)      Past Surgical History:   Procedure Laterality Date     EXAM UNDER ANESTHESIA PELVIC N/A 2019    Procedure: EXAM UNDER ANESTHESIA PELVIC;  Surgeon: Jen Henry MD;  Location: [facility] OR     IRRIGATION AND DEBRIDEMENT FINGER, " COMBINED Left 10/18/2018    Procedure: 1.  Sharp incision and drainage, left index finger paronychia.  2.  Left index finger incision and drainage flexor tendon sheath. ;  Surgeon: Marco Tracy MD;  Location: [facility] OR     NO HISTORY OF SURGERY       OPERATIVE HYSTEROSCOPY WITH MORCELLATOR N/A 2/6/2019    Procedure: Exam under Anesthsia, Hysteroscopy, resection of uterine septum with Myosure;  Surgeon: Jen Henry MD;  Location: [facility] OR     Family History   Problem Relation Age of Onset     Diabetes Father      Asthma Mother      Hypertension Mother      Hypoglycemia Mother      Diabetes Paternal Grandmother      Diabetes Paternal Grandfather      Diabetes Maternal Uncle      Diabetes Maternal Aunt      Hypertension Maternal Grandmother      Breast Cancer Maternal Grandmother      Social History     Socioeconomic History     Marital status: Single     Spouse name: None     Number of children: None     Years of education: None     Highest education level: None   Occupational History     None   Social Needs     Financial resource strain: None     Food insecurity:     Worry: None     Inability: None     Transportation needs:     Medical: None     Non-medical: None   Tobacco Use     Smoking status: Never Smoker     Smokeless tobacco: Never Used   Substance and Sexual Activity     Alcohol use: Yes     Comment: 1-2 times per week     Drug use: No     Sexual activity: Never     Partners: Male   Lifestyle     Physical activity:     Days per week: None     Minutes per session: None     Stress: None   Relationships     Social connections:     Talks on phone: None     Gets together: None     Attends Voodoo service: None     Active member of club or organization: None     Attends meetings of clubs or organizations: None     Relationship status: None     Intimate partner violence:     Fear of current or ex partner: None     Emotionally abused: None     Physically abused: None     Forced sexual activity: None   Other  "Topics Concern     Parent/sibling w/ CABG, MI or angioplasty before 65F 55M? Not Asked   Social History Narrative     None       Current Outpatient Medications:      acetaminophen (TYLENOL) 325 MG tablet, Take 2 tablets (650 mg) by mouth every 4 hours as needed for mild pain, Disp: 40 tablet, Rfl: 0     ibuprofen (ADVIL/MOTRIN) 200 MG tablet, Take 3 tablets (600 mg) by mouth every 6 hours as needed for moderate pain, Disp: 40 tablet, Rfl: 0     vitamin D3 (CHOLECALCIFEROL) 53990 units capsule, Take 1 capsule (50,000 Units) by mouth once a week, Disp: 8 capsule, Rfl: 1     Norethin Ace-Eth Estrad-FE (BLISOVI 24 FE PO), , Disp: , Rfl:      Allergies   Allergen Reactions     No Known Drug Allergies        Past medical, surgical, social and family history were reviewed and updated in EPIC.    ROS:   12 point review of systems negative other than symptoms noted below.      OBJECTIVE:                                                      EXAM:  /80   Pulse 78   Ht 1.676 m (5' 6\")   Wt 106.1 kg (234 lb)   LMP 01/29/2020   Breastfeeding No   BMI 37.77 kg/m     BMI: Body mass index is 37.77 kg/m .  General: Alert and oriented, no distress.  Psychiatric: Mood and affect within normal limits.  Skin: Warm and dry, no lesions, rashes or discolorations.  Neck: Neck supple. Thyroid palpbably normal in size and without nodularity.  Cardiovascular: Regular rate and rhythm, no murmurs, rubs or gallops.   Lungs:  Clear to auscultation bilaterally, breathing is unlabored.  Breasts:  Symmetric, no skin changes.  No dominant masses bilaterally.   Lymph:  No cervical, supraclavicular, infraclavicular, axillary or inguinal lymphadenopathy palpable.   Abdomen: Soft, nontender, no hepatosplenomegaly, no rebound or guarding, no masses, no hernias.   Vulva:  No external lesions, normal female hair distribution, no inguinal adenopathy.    Urethra:  Midline, non-tender, well supported, no discharge  Vagina:  Well-estrogenized, no " "abnormal discharge, no lesions. Significant tenderness to palpation of the levators bilaterally.  Cervix: nontender  Uterus:  anteverted, smooth contour, without enlargement, mobile, and without tenderness  Ovaries:  No masses appreciated, non-tender, mobile  Rectal Exam: deferred  Musculoskeletal: extremities normal      COUNSELING:   Reviewed preventive health counseling, as reflected in patient instructions       Regular exercise       Healthy diet/nutrition       Family planning       Folic Acid Counseling   reports that she has never smoked. She has never used smokeless tobacco.        ASSESSMENT/PLAN:                                                      29 year old female with satisfactory annual exam  (N94.2) Vaginismus  (primary encounter diagnosis)  Comment:   Plan: [facility] PT, HAND, AND CHIROPRACTIC REFERRAL        Discussed pelvic floor dysfunction--I recommend referral to pelvic floor physical therapy, which she would like to pursue.    (Z01.419) Encounter for annual routine gynecological examination  Comment:   Plan: pap is up to date. Discussed yearly follow up.    (Z31.69) Encounter for preconception consultation  Comment:   Plan: Will start a prenatal with folic acid daily. Discussed tracking cycles, and I recommended use of an pola for her phone to estimate ovulation if she is interested. Briefly discussed ovulation tracking with kits. She asked about fertility treatments so she can \"speed up the process,\" but I recommended 12 months of unprotected intercourse if her cycles are monthly, in which case a work up would be initiated to look for causes of infertility prior to using any treatments. As she is currently describing sex only 1-3 times a month, we talked about timing and frequency of intercourse for optimal chance at conception.    Jen Henry MD      "

## 2020-02-24 ENCOUNTER — NURSE TRIAGE (OUTPATIENT)
Dept: NURSING | Facility: CLINIC | Age: 30
End: 2020-02-24

## 2020-02-24 ENCOUNTER — OFFICE VISIT (OUTPATIENT)
Dept: INTERNAL MEDICINE | Facility: CLINIC | Age: 30
End: 2020-02-24
Payer: COMMERCIAL

## 2020-02-24 VITALS
HEART RATE: 109 BPM | SYSTOLIC BLOOD PRESSURE: 108 MMHG | DIASTOLIC BLOOD PRESSURE: 80 MMHG | HEIGHT: 66 IN | TEMPERATURE: 97.9 F | WEIGHT: 230 LBS | RESPIRATION RATE: 16 BRPM | OXYGEN SATURATION: 100 % | BODY MASS INDEX: 36.96 KG/M2

## 2020-02-24 DIAGNOSIS — Z00.00 ROUTINE GENERAL MEDICAL EXAMINATION AT A HEALTH CARE FACILITY: Primary | ICD-10-CM

## 2020-02-24 LAB
BASOPHILS # BLD AUTO: 0.1 10E9/L (ref 0–0.2)
BASOPHILS NFR BLD AUTO: 0.6 %
DIFFERENTIAL METHOD BLD: ABNORMAL
EOSINOPHIL # BLD AUTO: 0.4 10E9/L (ref 0–0.7)
EOSINOPHIL NFR BLD AUTO: 3.5 %
ERYTHROCYTE [DISTWIDTH] IN BLOOD BY AUTOMATED COUNT: 14.1 % (ref 10–15)
HBA1C MFR BLD: 5.5 % (ref 0–5.6)
HCG UR QL: NEGATIVE
HCT VFR BLD AUTO: 41.5 % (ref 35–47)
HGB BLD-MCNC: 13.3 G/DL (ref 11.7–15.7)
LYMPHOCYTES # BLD AUTO: 3.2 10E9/L (ref 0.8–5.3)
LYMPHOCYTES NFR BLD AUTO: 29 %
MCH RBC QN AUTO: 28 PG (ref 26.5–33)
MCHC RBC AUTO-ENTMCNC: 32 G/DL (ref 31.5–36.5)
MCV RBC AUTO: 87 FL (ref 78–100)
MONOCYTES # BLD AUTO: 0.7 10E9/L (ref 0–1.3)
MONOCYTES NFR BLD AUTO: 6.7 %
NEUTROPHILS # BLD AUTO: 6.6 10E9/L (ref 1.6–8.3)
NEUTROPHILS NFR BLD AUTO: 60.2 %
PLATELET # BLD AUTO: 490 10E9/L (ref 150–450)
RBC # BLD AUTO: 4.75 10E12/L (ref 3.8–5.2)
WBC # BLD AUTO: 10.9 10E9/L (ref 4–11)

## 2020-02-24 PROCEDURE — 83540 ASSAY OF IRON: CPT | Performed by: INTERNAL MEDICINE

## 2020-02-24 PROCEDURE — 82306 VITAMIN D 25 HYDROXY: CPT | Performed by: INTERNAL MEDICINE

## 2020-02-24 PROCEDURE — 36415 COLL VENOUS BLD VENIPUNCTURE: CPT | Performed by: INTERNAL MEDICINE

## 2020-02-24 PROCEDURE — 99395 PREV VISIT EST AGE 18-39: CPT | Performed by: INTERNAL MEDICINE

## 2020-02-24 PROCEDURE — 81025 URINE PREGNANCY TEST: CPT | Performed by: INTERNAL MEDICINE

## 2020-02-24 PROCEDURE — 83036 HEMOGLOBIN GLYCOSYLATED A1C: CPT | Performed by: INTERNAL MEDICINE

## 2020-02-24 PROCEDURE — 80050 GENERAL HEALTH PANEL: CPT | Performed by: INTERNAL MEDICINE

## 2020-02-24 PROCEDURE — 82728 ASSAY OF FERRITIN: CPT | Performed by: INTERNAL MEDICINE

## 2020-02-24 PROCEDURE — 83550 IRON BINDING TEST: CPT | Performed by: INTERNAL MEDICINE

## 2020-02-24 PROCEDURE — 80061 LIPID PANEL: CPT | Performed by: INTERNAL MEDICINE

## 2020-02-24 ASSESSMENT — ANXIETY QUESTIONNAIRES
2. NOT BEING ABLE TO STOP OR CONTROL WORRYING: SEVERAL DAYS
7. FEELING AFRAID AS IF SOMETHING AWFUL MIGHT HAPPEN: SEVERAL DAYS
GAD7 TOTAL SCORE: 9
IF YOU CHECKED OFF ANY PROBLEMS ON THIS QUESTIONNAIRE, HOW DIFFICULT HAVE THESE PROBLEMS MADE IT FOR YOU TO DO YOUR WORK, TAKE CARE OF THINGS AT HOME, OR GET ALONG WITH OTHER PEOPLE: NOT DIFFICULT AT ALL
3. WORRYING TOO MUCH ABOUT DIFFERENT THINGS: SEVERAL DAYS
1. FEELING NERVOUS, ANXIOUS, OR ON EDGE: MORE THAN HALF THE DAYS
6. BECOMING EASILY ANNOYED OR IRRITABLE: SEVERAL DAYS
5. BEING SO RESTLESS THAT IT IS HARD TO SIT STILL: MORE THAN HALF THE DAYS

## 2020-02-24 ASSESSMENT — MIFFLIN-ST. JEOR: SCORE: 1785.02

## 2020-02-24 ASSESSMENT — PATIENT HEALTH QUESTIONNAIRE - PHQ9: 5. POOR APPETITE OR OVEREATING: SEVERAL DAYS

## 2020-02-24 NOTE — PATIENT INSTRUCTIONS
Jane, counselor    Consider zoloft    Melatonin    Magnesium 200mg    No caffeine 8 hours before bed    Exercise regularly  10 minutes in a row beneficial  Preventive Health Recommendations  Female Ages 26 - 39  Yearly exam:   See your health care provider every year in order to    Review health changes.     Discuss preventive care.      Review your medicines if you your doctor has prescribed any.    Until age 30: Get a Pap test every three years (more often if you have had an abnormal result).    After age 30: Talk to your doctor about whether you should have a Pap test every 3 years or have a Pap test with HPV screening every 5 years.   You do not need a Pap test if your uterus was removed (hysterectomy) and you have not had cancer.  You should be tested each year for STDs (sexually transmitted diseases), if you're at risk.   Talk to your provider about how often to have your cholesterol checked.  If you are at risk for diabetes, you should have a diabetes test (fasting glucose).  Shots: Get a flu shot each year. Get a tetanus shot every 10 years.   Nutrition:     Eat at least 5 servings of fruits and vegetables each day.    Eat whole-grain bread, whole-wheat pasta and brown rice instead of white grains and rice.    Get adequate Calcium and Vitamin D.     Lifestyle    Exercise at least 150 minutes a week (30 minutes a day, 5 days of the week). This will help you control your weight and prevent disease.    Limit alcohol to one drink per day.    No smoking.     Wear sunscreen to prevent skin cancer.    See your dentist every six months for an exam and cleaning.    Preventive Health Recommendations  Female Ages 26 - 39  Yearly exam:   See your health care provider every year in order to    Review health changes.     Discuss preventive care.      Review your medicines if you your doctor has prescribed any.    Until age 30: Get a Pap test every three years (more often if you have had an abnormal result).    After  age 30: Talk to your doctor about whether you should have a Pap test every 3 years or have a Pap test with HPV screening every 5 years.   You do not need a Pap test if your uterus was removed (hysterectomy) and you have not had cancer.  You should be tested each year for STDs (sexually transmitted diseases), if you're at risk.   Talk to your provider about how often to have your cholesterol checked.  If you are at risk for diabetes, you should have a diabetes test (fasting glucose).  Shots: Get a flu shot each year. Get a tetanus shot every 10 years.   Nutrition:     Eat at least 5 servings of fruits and vegetables each day.    Eat whole-grain bread, whole-wheat pasta and brown rice instead of white grains and rice.    Get adequate Calcium and Vitamin D.     Lifestyle    Exercise at least 150 minutes a week (30 minutes a day, 5 days of the week). This will help you control your weight and prevent disease.    Limit alcohol to one drink per day.    No smoking.     Wear sunscreen to prevent skin cancer.    See your dentist every six months for an exam and cleaning.

## 2020-02-24 NOTE — PROGRESS NOTES
SUBJECTIVE:   CC: Karen Orellana is an 29 year old woman who presents for preventive health visit.     HPI    Exercise- 3-5 times per week for 2 hours; recently- none  Eye doctor- needs to be seen  Dentist- regular  Diet- trying to eat healthy            PROBLEMS TO ADD ON...    Today's PHQ-2 Score:   PHQ-2 ( 1999 [employer]) 2/14/2020   Q1: Little interest or pleasure in doing things 0   Q2: Feeling down, depressed or hopeless 0   PHQ-2 Score 0   Q1: Little interest or pleasure in doing things -   Q2: Feeling down, depressed or hopeless -   PHQ-2 Score -     SUNIL of 8.  Sleeping issues- night shifts    Abuse: Current or Past(Physical, Sexual or Emotional)- No  Do you feel safe in your environment? Yes        Social History     Tobacco Use     Smoking status: Never Smoker     Smokeless tobacco: Never Used   Substance Use Topics     Alcohol use: Yes     Comment: 1-2 times per week         Reviewed orders with patient.  Reviewed health maintenance and updated orders accordingly - Yes  Labs reviewed in EPIC    Mammogram not appropriate for this patient based on age.    Pertinent mammograms are reviewed under the imaging tab.  History of abnormal Pap smear: NO - age 21-29 PAP every 3 years recommended  PAP / HPV 3/12/2018 7/23/2014   PAP NIL NIL     Reviewed and updated as needed this visit by clinical staff  Tobacco  Allergies  Meds  Problems  Surg Hx         Reviewed and updated as needed this visit by Provider  Meds  Problems  Surg Hx            Review of Systems  CONSTITUTIONAL: NEGATIVE for fever, chills, change in weight  INTEGUMENTARU/SKIN: NEGATIVE for worrisome rashes, moles or lesions  EYES: NEGATIVE for vision changes or irritation  ENT: NEGATIVE for ear, mouth and throat problems  RESP: NEGATIVE for significant cough or SOB  BREAST: NEGATIVE for masses, tenderness or discharge  CV: NEGATIVE for chest pain, palpitations or peripheral edema  GI: NEGATIVE for nausea, abdominal pain, heartburn, or  "change in bowel habits  : NEGATIVE for unusual urinary or vaginal symptoms. Periods are regular.  MUSCULOSKELETAL: NEGATIVE for significant arthralgias or myalgia  NEURO: NEGATIVE for weakness, dizziness or paresthesias  PSYCHIATRIC: NEGATIVE for changes in mood or affect     OBJECTIVE:   /80   Pulse 109   Temp 97.9  F (36.6  C) (Oral)   Resp 16   Ht 1.676 m (5' 6\")   Wt 104.3 kg (230 lb)   LMP 01/29/2020 (Exact Date)   SpO2 100%   BMI 37.12 kg/m    Physical Exam  GENERAL: healthy, alert and no distress  EYES: Eyes grossly normal to inspection, PERRL and conjunctivae and sclerae normal  HENT: ear canals and TM's normal, nose and mouth without ulcers or lesions  NECK: no adenopathy, no asymmetry, masses, or scars and thyroid normal to palpation  RESP: lungs clear to auscultation - no rales, rhonchi or wheezes  BREAST: normal without masses, tenderness or nipple discharge and no palpable axillary masses or adenopathy  CV: regular rate and rhythm, normal S1 S2, no S3 or S4, no murmur, click or rub, no peripheral edema and peripheral pulses strong  ABDOMEN: soft, nontender, no hepatosplenomegaly, no masses and bowel sounds normal  MS: no gross musculoskeletal defects noted, no edema  SKIN: no suspicious lesions or rashes  NEURO: Normal strength and tone, mentation intact and speech normal  PSYCH: mentation appears normal, affect normal/bright    Diagnostic Test Results:  Labs reviewed in Epic    ASSESSMENT/PLAN:       (Z00.00) Routine general medical examination at a health care facility  (primary encounter diagnosis)  Comment: fasting  Plan: Vitamin D Deficiency, CBC with platelets         differential, Comprehensive metabolic panel,         Lipid panel reflex to direct LDL Fasting, TSH         with free T4 reflex, Iron and iron binding         capacity, Ferritin, Hemoglobin A1c, HCG Qual,         Urine ([id number])          Recommend counseling, Jane, for sleep/anxiety issues    COUNSELING:  Reviewed " "preventive health counseling, as reflected in patient instructions       Regular exercise       Healthy diet/nutrition    Estimated body mass index is 37.12 kg/m  as calculated from the following:    Height as of this encounter: 1.676 m (5' 6\").    Weight as of this encounter: 104.3 kg (230 lb).         reports that she has never smoked. She has never used smokeless tobacco.      Counseling Resources:  ATP IV Guidelines  Pooled Cohorts Equation Calculator  Breast Cancer Risk Calculator  FRAX Risk Assessment  ICSI Preventive Guidelines  Dietary Guidelines for Americans, 2010  USDA's MyPlate  ASA Prophylaxis  Lung CA Screening    Fariha Chilel MD  [facility]  "

## 2020-02-25 LAB
ALBUMIN SERPL-MCNC: 3.8 G/DL (ref 3.4–5)
ALP SERPL-CCNC: 79 U/L (ref 40–150)
ALT SERPL W P-5'-P-CCNC: 26 U/L (ref 0–50)
ANION GAP SERPL CALCULATED.3IONS-SCNC: 7 MMOL/L (ref 3–14)
AST SERPL W P-5'-P-CCNC: 13 U/L (ref 0–45)
BILIRUB SERPL-MCNC: 0.3 MG/DL (ref 0.2–1.3)
BUN SERPL-MCNC: 10 MG/DL (ref 7–30)
CALCIUM SERPL-MCNC: 9.1 MG/DL (ref 8.5–10.1)
CHLORIDE SERPL-SCNC: 107 MMOL/L (ref 94–109)
CHOLEST SERPL-MCNC: 169 MG/DL
CO2 SERPL-SCNC: 23 MMOL/L (ref 20–32)
CREAT SERPL-MCNC: 0.68 MG/DL (ref 0.52–1.04)
DEPRECATED CALCIDIOL+CALCIFEROL SERPL-MC: 15 UG/L (ref 20–75)
FERRITIN SERPL-MCNC: 161 NG/ML (ref 12–150)
GFR SERPL CREATININE-BSD FRML MDRD: >90 ML/MIN/{1.73_M2}
GLUCOSE SERPL-MCNC: 85 MG/DL (ref 70–99)
HDLC SERPL-MCNC: 44 MG/DL
IRON SATN MFR SERPL: 23 % (ref 15–46)
IRON SERPL-MCNC: 77 UG/DL (ref 35–180)
LDLC SERPL CALC-MCNC: 104 MG/DL
NONHDLC SERPL-MCNC: 125 MG/DL
POTASSIUM SERPL-SCNC: 3.9 MMOL/L (ref 3.4–5.3)
PROT SERPL-MCNC: 8.9 G/DL (ref 6.8–8.8)
SODIUM SERPL-SCNC: 137 MMOL/L (ref 133–144)
TIBC SERPL-MCNC: 342 UG/DL (ref 240–430)
TRIGL SERPL-MCNC: 105 MG/DL
TSH SERPL DL<=0.005 MIU/L-ACNC: 2.18 MU/L (ref 0.4–4)

## 2020-02-25 ASSESSMENT — ANXIETY QUESTIONNAIRES: GAD7 TOTAL SCORE: 9

## 2020-02-25 NOTE — TELEPHONE ENCOUNTER
Patient calling back, but did not see record of a phone call from clinic.  Message from provider under labs regarding HCG Qualitative Urine relayed to patient, but several others still pending.  She will follow up with clinic tomorrow.    Dominique Connor RN  [facility]    Reason for Disposition    [1] Caller requesting NON-URGENT health information AND [2] PCP's office is the best resource    Protocols used: INFORMATION ONLY CALL-A-AH

## 2020-03-04 ENCOUNTER — HOSPITAL ENCOUNTER (EMERGENCY)
Facility: CLINIC | Age: 30
Discharge: HOME | End: 2020-03-05
Attending: PHYSICIAN ASSISTANT | Admitting: PHYSICIAN ASSISTANT
Payer: COMMERCIAL

## 2020-03-04 VITALS
DIASTOLIC BLOOD PRESSURE: 107 MMHG | OXYGEN SATURATION: 100 % | SYSTOLIC BLOOD PRESSURE: 145 MMHG | BODY MASS INDEX: 35.03 KG/M2 | RESPIRATION RATE: 20 BRPM | TEMPERATURE: 98.5 F | HEIGHT: 66 IN | WEIGHT: 218 LBS

## 2020-03-04 DIAGNOSIS — F41.9 ANXIETY: ICD-10-CM

## 2020-03-04 DIAGNOSIS — R06.02 SHORTNESS OF BREATH: ICD-10-CM

## 2020-03-04 LAB
ANION GAP SERPL CALCULATED.3IONS-SCNC: 5 MMOL/L (ref 3–14)
BASOPHILS # BLD AUTO: 0.1 10E9/L (ref 0–0.2)
BASOPHILS NFR BLD AUTO: 0.4 %
BUN SERPL-MCNC: 9 MG/DL (ref 7–30)
CALCIUM SERPL-MCNC: 9.1 MG/DL (ref 8.5–10.1)
CHLORIDE SERPL-SCNC: 104 MMOL/L (ref 94–109)
CO2 SERPL-SCNC: 26 MMOL/L (ref 20–32)
CREAT SERPL-MCNC: 0.64 MG/DL (ref 0.52–1.04)
DIFFERENTIAL METHOD BLD: ABNORMAL
EOSINOPHIL # BLD AUTO: 0.3 10E9/L (ref 0–0.7)
EOSINOPHIL NFR BLD AUTO: 2.8 %
ERYTHROCYTE [DISTWIDTH] IN BLOOD BY AUTOMATED COUNT: 13.7 % (ref 10–15)
GFR SERPL CREATININE-BSD FRML MDRD: >90 ML/MIN/{1.73_M2}
GLUCOSE SERPL-MCNC: 90 MG/DL (ref 70–99)
HCT VFR BLD AUTO: 40.4 % (ref 35–47)
HGB BLD-MCNC: 13.1 G/DL (ref 11.7–15.7)
IMM GRANULOCYTES # BLD: 0 10E9/L (ref 0–0.4)
IMM GRANULOCYTES NFR BLD: 0.3 %
LYMPHOCYTES # BLD AUTO: 3.8 10E9/L (ref 0.8–5.3)
LYMPHOCYTES NFR BLD AUTO: 30.9 %
MCH RBC QN AUTO: 28.9 PG (ref 26.5–33)
MCHC RBC AUTO-ENTMCNC: 32.4 G/DL (ref 31.5–36.5)
MCV RBC AUTO: 89 FL (ref 78–100)
MONOCYTES # BLD AUTO: 0.7 10E9/L (ref 0–1.3)
MONOCYTES NFR BLD AUTO: 5.8 %
NEUTROPHILS # BLD AUTO: 7.3 10E9/L (ref 1.6–8.3)
NEUTROPHILS NFR BLD AUTO: 59.8 %
NRBC # BLD AUTO: 0 10*3/UL
NRBC BLD AUTO-RTO: 0 /100
PLATELET # BLD AUTO: 452 10E9/L (ref 150–450)
POTASSIUM SERPL-SCNC: 3.4 MMOL/L (ref 3.4–5.3)
RBC # BLD AUTO: 4.53 10E12/L (ref 3.8–5.2)
SODIUM SERPL-SCNC: 135 MMOL/L (ref 133–144)
TROPONIN I SERPL-MCNC: <0.015 UG/L (ref 0–0.04)
WBC # BLD AUTO: 12.3 10E9/L (ref 4–11)

## 2020-03-04 PROCEDURE — 90791 PSYCH DIAGNOSTIC EVALUATION: CPT

## 2020-03-04 PROCEDURE — 99285 EMERGENCY DEPT VISIT HI MDM: CPT | Mod: 25

## 2020-03-04 PROCEDURE — 84484 ASSAY OF TROPONIN QUANT: CPT | Performed by: PHYSICIAN ASSISTANT

## 2020-03-04 PROCEDURE — 85025 COMPLETE CBC W/AUTO DIFF WBC: CPT | Performed by: PHYSICIAN ASSISTANT

## 2020-03-04 PROCEDURE — 80048 BASIC METABOLIC PNL TOTAL CA: CPT | Performed by: PHYSICIAN ASSISTANT

## 2020-03-04 PROCEDURE — 25000132 ZZH RX MED GY IP 250 OP 250 PS 637: Performed by: PHYSICIAN ASSISTANT

## 2020-03-04 RX ORDER — LORAZEPAM 1 MG/1
1 TABLET ORAL ONCE
Status: COMPLETED | OUTPATIENT
Start: 2020-03-04 | End: 2020-03-04

## 2020-03-04 RX ADMIN — LORAZEPAM 1 MG: 1 TABLET ORAL at 21:58

## 2020-03-04 ASSESSMENT — MIFFLIN-ST. JEOR: SCORE: 1730.59

## 2020-03-04 NOTE — ED AVS SNAPSHOT
[facility] Emergency Department  [address]  [facility] [address]  Phone:  [phone]  Fax:  [phone]                                    Karen Orellana   MRN: [id number]    Department:  [facility] Department   Date of Visit:  3/4/2020           After Visit Summary Signature Page    I have received my discharge instructions, and my questions have been answered. I have discussed any challenges I see with this plan with the nurse or doctor.    ..........................................................................................................................................  Patient/Patient Representative Signature      ..........................................................................................................................................  Patient Representative Print Name and Relationship to Patient    ..................................................               ................................................  Date                                   Time    ..........................................................................................................................................  Reviewed by Signature/Title    ...................................................              ..............................................  Date                                               Time          22EPIC Rev 08/18

## 2020-03-05 ENCOUNTER — PATIENT OUTREACH (OUTPATIENT)
Dept: CARE COORDINATION | Facility: CLINIC | Age: 30
End: 2020-03-05

## 2020-03-05 DIAGNOSIS — Z71.89 OTHER SPECIFIED COUNSELING: ICD-10-CM

## 2020-03-05 LAB — INTERPRETATION ECG - MUSE: NORMAL

## 2020-03-05 ASSESSMENT — ENCOUNTER SYMPTOMS
SHORTNESS OF BREATH: 1
FEVER: 0
CONFUSION: 0
WEAKNESS: 0
NERVOUS/ANXIOUS: 1

## 2020-03-05 NOTE — LETTER
[facility]ORDIN[facility]  [address]  [facility] [address]  March 6, 2020      Karen Jacobocarol Orellana  [address]  [facility] [address]      Dear Karen,    I am a clinic care coordinator who works with Jen Henry MD at [facility]. I have been trying to reach you recently to introduce Clinic Care Coordination and to see if there was anything I could assist you with.  Below is a description of clinic care coordination and how I can further assist you.      The clinic care coordinator team is made up of a registered nurse,  and community health worker who understand the health care system. The goal of clinic care coordination is to help you manage your health and improve access to the health care system in the most efficient manner. The team can assist you in meeting your health care goals by providing education, coordinating services, strengthening the communication among your providers  and supporting you with any resource needs.    Please feel free to contact me, at [phone] with any questions or concerns. We are focused on providing you with the highest-quality healthcare experience possible and that all starts with you.     Sincerely,     IRISH Gtz                                                                                          Clinic Care Coordination                                                  [facility] [place] [facility] [place]                  Phone: [phone]      Enclosed: I have enclosed our Care Coordination brochure and my business card.

## 2020-03-05 NOTE — PROGRESS NOTES
CCC Referral: Attempt 1  Community Health Worker called and left a message for the patient in order to discuss enrollment with Clinic Care Coordination.    If the patient is returning my call, please transfer patient to me, Magali, at [phone].    Per chart review  ED for increased anxious moods and SOB    Order Details   Proc category: Referral Class: Local Print   Standing status: Normal Order status: Sent   Enc provider: Jen Henry MD Enc department:  Care Coordination   Order date: 3/5/2020 Order user: Amelie Abdullahi RN   Visit type: CCC PHONE VISIT Appointment Window:     Lead Care Coordinator:     Comments   CHW to outreach  Referral made off of discharge report.   ______________________  Next Outreach: 03/06/20

## 2020-03-05 NOTE — PROGRESS NOTES
Patient identified as high risk for readmission.  Patient meets criteria for care coordination outreach.    Amelie Abdullahi RN  Care Coordination  Phone:  [phone]  Email: [email or web address]

## 2020-03-05 NOTE — ED PROVIDER NOTES
"  History     Chief Complaint:  Anxiety    HPI   Karen Orellana is a 29 year old female who presents with increasing anxious mood and shortness of breath.  The patient states that for the past 2 weeks, she has been noting an increase in her anxiety.  She notes no new stressors at home.  She has never been formally diagnosed with depression or anxiety.  She notes that she did have similar symptoms 9 years ago when her brother committed suicide.  However, since that time she has not had significant issues.  She does not have any current thoughts of hurting herself or other people.  No suicidal ideation.  She notes that she has been very tearful lately, feels hopeless at times, does not have a good support system.  She states that her family does not talk about mental illness and does not encourage seeking help.  She does note that she has good support system with her fiancé.  She has not tried any medications to help with her symptoms.  She was in therapy when her brother , but nothing recently.  She feels \"overwhelmed\" at times by her motion.  She denies any chest pain, shortness of breath, fever, smoking, alcohol, drug use.    Allergies:  No Known Drug Allergies      Medications:    The patient is not currently taking any prescribed medications.      Past Medical History:    Anemia  PCOS  PONV  Obesity   Migraine     Past Surgical History:    Exam under anesthesia pelvic  Irrigation and debridement finger, left  Operative hysteroscopy with morcellator     Family History:    Father - Diabetes  Mother - Asthma, hypertension, hypoglycemia     Social History:  The patient was accompanied to the ED by herself   Smoking Status: Never  Smokeless Tobacco: Never  Alcohol Use: Yes  Drug Use: No   Marital Status:  Single [1]     Review of Systems   Constitutional: Negative for fever.   Respiratory: Positive for shortness of breath.    Cardiovascular: Negative for chest pain.   Neurological: Negative for weakness. " "  Psychiatric/Behavioral: Negative for confusion, self-injury and suicidal ideas. The patient is nervous/anxious.    All other systems reviewed and are negative.    Physical Exam     Patient Vitals for the past 24 hrs:   BP Temp Temp src Heart Rate Resp SpO2 Height Weight   03/04/20 2054 (!) 145/107 98.5  F (36.9  C) Temporal 67 20 100 % 1.676 m (5' 6\") 98.9 kg (218 lb)     Physical Exam  General: Well appearing, well nourished. Normal mood and affect.  Skin: Good turgor, no rash, no unusual bruising or prominent lesions.  HEENT: Head: Normocephalic, atraumatic, no visible masses.   Eyes: Conjunctiva clear.  Throat/pharynx: Mucous membranes moist, no mucosal lesions.   Cardiac: Normal rate and regular rhythm, no murmur or gallop.   Lungs: Clear to auscultation.  Musculoskeletal: Normal gait and station. No calf tenderness or swelling.   Neurologic: Oriented x 3. GCS: 15.  Psychiatric: Anxious appearing.  Occasionally tearful.  Denies any suicidal or homicidal ideation.  No hallucinations.    Emergency Department Course   ECG:  Time: 2106 Vent. Rate 63 bpm. MA interval 164. QRS duration 94. QT/QTc 434/444. P-R-T axis 14 30 6.  Normal sinus rhythm  Normal ECG  Read time: 2119    Imaging:  None     Laboratory:  CBC: WBC: 12.3 (H), HGB: 13.1, PLT: 452 (H)  BMP: all WNL (Creatinine: 0.64)    2233 Troponin: <0.015      Procedures:  None     Interventions:  2158 Ativan 1 mg PO    Emergency Department Course:  Physical exam and history obtained.  Blood was drawn.  I evaluated the patient.    I do spoke with the [facility], DEC I reevaluated patient.  She feels improved.   about the patient.    I reevaluate the patient.    I spoke again with DEC.    We discussed outpatient management.  Appointments given to patient.  All questions answered.     Impression & Plan    Medical Decision Making:  Karen Orellana is a 29 year old female who presented the ED today for evaluation of increased anxious mood and " shortness of breath.  Details the patient's history can be known the HPI.  Differentials considered include anxiety, depression, suicidal ideation, electrolyte abnormalities, ACS, PE, arrhythmia, amongst others.  On my exam, patient was tearful and anxious.  Basic labs obtained as well as ECG, returning showing no acute pathology.  I had her evaluated by her mental health specialist here, who recommended outpatient management with therapy.  She was given a dose of Ativan which significantly improved her symptoms.  She does not have any suicidal or thoughts of self-harm at this time, I do feel she is appropriate for outpatient management.  We will start with therapy, and can add psychiatry if this is not the patient.  Return the emergency room for changing worsening symptoms, thoughts of self-harm, suicidal thoughts, increased hopelessness, new concerns.  All questions were answered.  She is in agreement with the treatment plan as stated above.    Diagnosis:    ICD-10-CM    1. Anxiety F41.9    2. Shortness of breath R06.02      Disposition:  discharged to home    Discharge Medications:  Discharge Medication List as of 3/4/2020 11:47 PM        Scribe Disclosure:  I, Kerri Griffith, am serving as a scribe on 3/4/2020 at 11:51 PM to personally document services performed by Reena Iraheta PA based on my observations and the provider's statements to me.     3/4/2020   [facility] EMERGENCY DEPARTMENT    This was created at least in part with a voice recognition software. Mistakes/typos may be present.      Reena Iraheta PA  03/05/20 0207

## 2020-03-05 NOTE — DISCHARGE INSTRUCTIONS
Keep your therapy appointments.  Return if you feel like your mood is increasing, you have increasing hopelessness, thoughts of hurting yourself or other people, new concerns.  Your blood work and heart rhythm are reassuring here.

## 2020-03-05 NOTE — ED TRIAGE NOTES
Pt presents for evaluation of increased stress and anxiety over since mid February. Tonight, pt began to have chest heaviness. Has overall stress with everything in life, can't relate anything specific. Hx family stigma against mental health issues, so support system isn't there.

## 2020-03-06 NOTE — PROGRESS NOTES
Clinic Care Coordination Contact  [facility]/Voicemail       Clinical Data: Care Coordinator Outreach  Outreach attempted x 2.  Left message on patient's voicemail with call back information and requested return call.    Plan: Care Coordinator will send care coordination introduction letter with care coordinator contact information and explanation of care coordination services via mail. Care Coordinator will do no further outreaches at this time.    IRISH Gtz                                                                      Clinic Care Coordination                                                  [facility] : Digna Patton, Prior Bell, and Basim                             Phone: [phone]

## 2020-12-08 ENCOUNTER — OFFICE VISIT (OUTPATIENT)
Dept: OBGYN | Facility: CLINIC | Age: 30
End: 2020-12-08
Payer: COMMERCIAL

## 2020-12-08 VITALS — WEIGHT: 228.8 LBS | DIASTOLIC BLOOD PRESSURE: 82 MMHG | SYSTOLIC BLOOD PRESSURE: 126 MMHG | BODY MASS INDEX: 36.93 KG/M2

## 2020-12-08 DIAGNOSIS — Z31.9 PATIENT DESIRES PREGNANCY: Primary | ICD-10-CM

## 2020-12-08 DIAGNOSIS — Z87.42 HISTORY OF PCOS: ICD-10-CM

## 2020-12-08 PROCEDURE — 99214 OFFICE O/P EST MOD 30 MIN: CPT | Performed by: OBSTETRICS & GYNECOLOGY

## 2020-12-08 RX ORDER — AMOXICILLIN 500 MG
1200 CAPSULE ORAL DAILY
COMMUNITY
End: 2022-01-05

## 2020-12-08 RX ORDER — ASPIRIN 81 MG
100 TABLET, DELAYED RELEASE (ENTERIC COATED) ORAL EVERY OTHER DAY
COMMUNITY
End: 2022-01-05

## 2020-12-08 RX ORDER — GLUCOSAMINE HCL 500 MG
TABLET ORAL
COMMUNITY
End: 2022-01-05

## 2020-12-08 RX ORDER — DOXYCYCLINE 100 MG/1
100 CAPSULE ORAL 2 TIMES DAILY
Qty: 6 CAPSULE | Refills: 0 | Status: SHIPPED | OUTPATIENT
Start: 2020-12-08 | End: 2020-12-11

## 2020-12-08 NOTE — NURSING NOTE
"Chief Complaint   Patient presents with     Consult     for fertility, patient has been trying concieve for over a year.        Initial /82   Wt 103.8 kg (228 lb 12.8 oz)   LMP 2020   BMI 36.93 kg/m   Estimated body mass index is 36.93 kg/m  as calculated from the following:    Height as of 3/4/20: 1.676 m (5' 6\").    Weight as of this encounter: 103.8 kg (228 lb 12.8 oz).  BP completed using cuff size: regular    Questioned patient about current smoking habits.  Pt. has never smoked.          The following HM Due: NONE      Denys Huddleston CMA               "

## 2020-12-08 NOTE — PATIENT INSTRUCTIONS
Your evaluation for infertility involves tests for you and for your partner. These tests may include blood work timed for certain days of the menstrual cycle, a special x-ray to evaluate whether the fallopian tubes are open (HSG, or hysterosalpingogram), and possibly an ultrasound. Your partner may need to have a semen analysis done to check for normal numbers of sperm and whether the sperm have a normal shape and normal abilities to move. Not all patients will need all of these tests done, especially if you have had testing elsewhere before your visit to our clinic.    Call your clinic on the first business day after your period starts to schedule labs for the third or fourth day of your cycle (FSH, estradiol.) You may also need to schedule an HSG at this time, which will be done in the radiology department. You will need antibiotics if you are having an HSG, so please make sure you tell the nurse what pharmacy you use if you have not already received a prescription for those.    You may need other labs done on the 21st day of your cycle (or the closest business day to that date.) This is a progesterone test to document whether you are ovulating.    Tests for thyroid function and other hormone levels do not have to be done at a particular time and may be scheduled with either of the above timed tests (on day 3 or day 21).    Your partner should get in touch with his primary care provider to arrange a semen analysis. Please be sure that those results are forwarded to your OBGYN provider so that a notation may be made in your record as well.    You will usually have a follow up appointment after all tests are completed, to review your results and to plan what steps should be taken next. If at any point in the process you have questions or concerns, please contact your clinic.    [facility]  [phone]

## 2020-12-08 NOTE — PROGRESS NOTES
Karen Orellana was seen today for initial infertility consultation.     She is a 30 year old year old  who has been attempting conception for 1 year(s) with her partner. Stopped oral contraceptive pills in 2019.    Prior pregnancy history is as follows:   OB History    Para Term  AB Living   0 0 0 0 0 0   SAB TAB Ectopic Multiple Live Births   0 0 0 0 0       HX:  Allergies   Allergen Reactions     No Known Drug Allergies      Past Medical History:   Diagnosis Date     Anemia      DUB-has required 2-3 transfusions     PCOS (polycystic ovarian syndrome)      PONV (postoperative nausea and vomiting)      Past Surgical History:   Procedure Laterality Date     EXAM UNDER ANESTHESIA PELVIC N/A 2019    Procedure: EXAM UNDER ANESTHESIA PELVIC;  Surgeon: Jen Henry MD;  Location: [facility] OR     IRRIGATION AND DEBRIDEMENT FINGER, COMBINED Left 10/18/2018    Procedure: 1.  Sharp incision and drainage, left index finger paronychia.  2.  Left index finger incision and drainage flexor tendon sheath. ;  Surgeon: Marco Tracy MD;  Location: [facility] OR     NO HISTORY OF SURGERY       OPERATIVE HYSTEROSCOPY WITH MORCELLATOR N/A 2019    Procedure: Exam under Anesthsia, Hysteroscopy, resection of uterine septum with Myosure;  Surgeon: Jen Henry MD;  Location: [facility] OR     Social History     Socioeconomic History     Marital status: Single     Spouse name: Not on file     Number of children: Not on file     Years of education: Not on file     Highest education level: Not on file   Occupational History     Not on file   Social Needs     Financial resource strain: Not on file     Food insecurity     Worry: Not on file     Inability: Not on file     Transportation needs     Medical: Not on file     Non-medical: Not on file   Tobacco Use     Smoking status: Never Smoker     Smokeless tobacco: Never Used   Substance and Sexual Activity     Alcohol use: Yes     Comment: 1-2 times per  week     Drug use: No     Sexual activity: Never     Partners: Male   Lifestyle     Physical activity     Days per week: Not on file     Minutes per session: Not on file     Stress: Not on file   Relationships     Social connections     Talks on phone: Not on file     Gets together: Not on file     Attends Yarsanism service: Not on file     Active member of club or organization: Not on file     Attends meetings of clubs or organizations: Not on file     Relationship status: Not on file     Intimate partner violence     Fear of current or ex partner: Not on file     Emotionally abused: Not on file     Physically abused: Not on file     Forced sexual activity: Not on file   Other Topics Concern     Parent/sibling w/ CABG, MI or angioplasty before 65F 55M? Not Asked   Social History Narrative     Not on file     Family History   Problem Relation Age of Onset     Diabetes Father      Asthma Mother      Hypertension Mother      Hypoglycemia Mother      Diabetes Paternal Grandmother      Diabetes Paternal Grandfather      Diabetes Maternal Uncle      Diabetes Maternal Aunt      Hypertension Maternal Grandmother      Breast Cancer Maternal Grandmother      Current Outpatient Medications   Medication Sig Dispense Refill     acetaminophen (TYLENOL) 325 MG tablet Take 2 tablets (650 mg) by mouth every 4 hours as needed for mild pain 40 tablet 0     Cranberry 400 MG TABS        Cyanocobalamin (VITAMIN B-12 PO)        Docosahexaenoic Acid (PRENATAL DHA PO) Take by mouth daily       docusate sodium (STOOL SOFTENER) 100 MG tablet Take 100 mg by mouth every other day       Ferrous Gluconate (IRON 27 PO)        ibuprofen (ADVIL/MOTRIN) 200 MG tablet Take 3 tablets (600 mg) by mouth every 6 hours as needed for moderate pain 40 tablet 0     Omega-3 Fatty Acids (FISH OIL) 1200 MG capsule Take 1,200 mg by mouth daily       vitamin B complex with vitamin C (VITAMIN  B COMPLEX) tablet Take 1 tablet by mouth daily       vitamin D3  "(CHOLECALCIFEROL) 09144 units capsule Take 1 capsule (50,000 Units) by mouth once a week 8 capsule 1       GYNECOLOGIC HISTORY    Periods are mostly regular every 22-23 days, lasting 2 days.   Dysmenorrhea moderate  Clots present, no heavy bleeding. Mostly just old blood/clots/stringy, not \"normal flow\"  Of note, uterine septum noted on US in 2018, with septum resected in 2019 hysteroscopically. See MRI and op note in Epic.    STD History: No STD history  PID History: No  Ovarian cysts: No  IUD use: No  Galactorrhea: No  ANNETTE exposure:No  Hirsuitism: Yes  Intolerance to hot or cold: No  Visual changes: No  Significant change in weight within last year: No  History of abnormal pap: No    Ultrasounds have been suggestive of PCOS in the past.    Male factor:   Partner is 27 years old.   No history of trauma to the genitalia, medications, tobacco, drug use other than he does smoke marijuana.  Prior children: none   Erectile dysfunction: No  Ejaculatory dysfunction: No  Family history of cystic fibrosis: No  Family history of developmental delay: No  Seen by urologist: No   Semen analysis: No      PAST INFERTILITY EVALUATION AND TREATMENT:  Patient's evaluation has included:  Ultrasound yes, see above.  HSG:NO  Laparoscopy: no    Hormonal evaluation has included:   None    Past infertility treatment included none.    EXAM:  Vitals: /82   Wt 103.8 kg (228 lb 12.8 oz)   LMP 11/21/2020   BMI 36.93 kg/m    BMI= Body mass index is 36.93 kg/m .    Gen: alert, oriented, in no distress.  Psych: normal mood and affect.  Lungs: respirations unlabored and without SOB.   No other examination was necessary for today's appointment.    ASSESSMENT/ PLAN:    Desires pregnancy.  History possible PCOS.    We discussed basics of conception, including regular ovulation and normal ovarian reserve, normal sperm count with normal motility and morphology, and no blockages in the uterus or fallopian tubes. We discussed the ways to " evaluate each of these components of basic fertility. She was given written information about the timing of blood work, and arranging semen analysis for her partner. We also discussed hysterosalpingogram, including ibuprofen prior to the procedure along with prophylactic antibiotics. She will call when her next cycle starts to schedule day 3 labs. She may choose to schedule a hysterosalpingogram in this next cycle or she may decide to wait until all hormone tests are available. I will see her back or complete a phone visit after all tests are completed to discuss the follow-up plan.    At this point, they are not looking to pursue any treatments to conceive, but are interested in proceeding with testing.    Jen Henry MD

## 2020-12-11 ENCOUNTER — HOSPITAL ENCOUNTER (OUTPATIENT)
Dept: LAB | Facility: CLINIC | Age: 30
Discharge: HOME | End: 2020-12-11
Attending: OBSTETRICS & GYNECOLOGY | Admitting: OBSTETRICS & GYNECOLOGY
Payer: COMMERCIAL

## 2020-12-11 DIAGNOSIS — Z87.42 HISTORY OF PCOS: ICD-10-CM

## 2020-12-11 DIAGNOSIS — Z31.9 PATIENT DESIRES PREGNANCY: ICD-10-CM

## 2020-12-11 LAB
GLUCOSE SERPL-MCNC: 87 MG/DL (ref 70–99)
HBA1C MFR BLD: 5.4 % (ref 0–5.6)
INSULIN SERPL-ACNC: 17 MU/L (ref 3–25)
PROGEST SERPL-MCNC: 12.8 NG/ML
PROLACTIN SERPL-MCNC: 11 UG/L (ref 3–27)
TSH SERPL DL<=0.005 MIU/L-ACNC: 0.5 MU/L (ref 0.4–4)

## 2020-12-11 PROCEDURE — 84403 ASSAY OF TOTAL TESTOSTERONE: CPT | Performed by: OBSTETRICS & GYNECOLOGY

## 2020-12-11 PROCEDURE — 84443 ASSAY THYROID STIM HORMONE: CPT | Performed by: OBSTETRICS & GYNECOLOGY

## 2020-12-11 PROCEDURE — 84144 ASSAY OF PROGESTERONE: CPT | Performed by: OBSTETRICS & GYNECOLOGY

## 2020-12-11 PROCEDURE — 83525 ASSAY OF INSULIN: CPT | Performed by: OBSTETRICS & GYNECOLOGY

## 2020-12-11 PROCEDURE — 84146 ASSAY OF PROLACTIN: CPT | Performed by: OBSTETRICS & GYNECOLOGY

## 2020-12-11 PROCEDURE — 82947 ASSAY GLUCOSE BLOOD QUANT: CPT | Performed by: OBSTETRICS & GYNECOLOGY

## 2020-12-11 PROCEDURE — 83036 HEMOGLOBIN GLYCOSYLATED A1C: CPT | Performed by: OBSTETRICS & GYNECOLOGY

## 2020-12-11 PROCEDURE — 84270 ASSAY OF SEX HORMONE GLOBUL: CPT | Performed by: OBSTETRICS & GYNECOLOGY

## 2020-12-11 PROCEDURE — 36415 COLL VENOUS BLD VENIPUNCTURE: CPT | Performed by: OBSTETRICS & GYNECOLOGY

## 2020-12-12 LAB
SHBG SERPL-SCNC: 31 NMOL/L (ref 30–135)
TESTOST FREE SERPL-MCNC: 0.35 NG/DL (ref 0.08–0.74)
TESTOST SERPL-MCNC: 19 NG/DL (ref 8–60)

## 2020-12-22 DIAGNOSIS — Z11.59 ENCOUNTER FOR SCREENING FOR OTHER VIRAL DISEASES: Primary | ICD-10-CM

## 2020-12-22 DIAGNOSIS — Z01.818 PREPROCEDURAL EXAMINATION: Primary | ICD-10-CM

## 2020-12-26 DIAGNOSIS — Z11.59 ENCOUNTER FOR SCREENING FOR OTHER VIRAL DISEASES: ICD-10-CM

## 2020-12-26 LAB
SARS-COV-2 RNA SPEC QL NAA+PROBE: NORMAL
SPECIMEN SOURCE: NORMAL

## 2020-12-26 PROCEDURE — U0003 INFECTIOUS AGENT DETECTION BY NUCLEIC ACID (DNA OR RNA); SEVERE ACUTE RESPIRATORY SYNDROME CORONAVIRUS 2 (SARS-COV-2) (CORONAVIRUS DISEASE [COVID-19]), AMPLIFIED PROBE TECHNIQUE, MAKING USE OF HIGH THROUGHPUT TECHNOLOGIES AS DESCRIBED BY CMS-2020-01-R: HCPCS | Performed by: OBSTETRICS & GYNECOLOGY

## 2020-12-27 LAB
LABORATORY COMMENT REPORT: NORMAL
SARS-COV-2 RNA SPEC QL NAA+PROBE: NEGATIVE
SPECIMEN SOURCE: NORMAL

## 2020-12-28 ENCOUNTER — HOSPITAL ENCOUNTER (OUTPATIENT)
Dept: GENERAL RADIOLOGY | Facility: CLINIC | Age: 30
Discharge: HOME | End: 2020-12-28
Attending: OBSTETRICS & GYNECOLOGY | Admitting: OBSTETRICS & GYNECOLOGY
Payer: COMMERCIAL

## 2020-12-28 DIAGNOSIS — Z31.9 PATIENT DESIRES PREGNANCY: ICD-10-CM

## 2020-12-28 PROCEDURE — 255N000002 HC RX 255 OP 636: Performed by: OBSTETRICS & GYNECOLOGY

## 2020-12-28 PROCEDURE — 74740 X-RAY FEMALE GENITAL TRACT: CPT

## 2020-12-28 RX ORDER — IOPAMIDOL 510 MG/ML
100 INJECTION, SOLUTION INTRAVASCULAR ONCE
Status: COMPLETED | OUTPATIENT
Start: 2020-12-28 | End: 2020-12-28

## 2020-12-28 RX ORDER — IOPAMIDOL 510 MG/ML
150 INJECTION, SOLUTION INTRAVASCULAR ONCE
Status: DISCONTINUED | OUTPATIENT
Start: 2020-12-28 | End: 2020-12-28

## 2020-12-28 RX ADMIN — IOPAMIDOL 8 ML: 510 INJECTION, SOLUTION INTRAVASCULAR at 11:09

## 2021-08-25 ENCOUNTER — OFFICE VISIT (OUTPATIENT)
Dept: URGENT CARE | Facility: URGENT CARE | Age: 31
End: 2021-08-25
Payer: COMMERCIAL

## 2021-08-25 VITALS
TEMPERATURE: 98 F | HEART RATE: 66 BPM | RESPIRATION RATE: 20 BRPM | BODY MASS INDEX: 37.32 KG/M2 | WEIGHT: 231.25 LBS | SYSTOLIC BLOOD PRESSURE: 141 MMHG | DIASTOLIC BLOOD PRESSURE: 86 MMHG | OXYGEN SATURATION: 100 %

## 2021-08-25 DIAGNOSIS — L02.92 BOIL: Primary | ICD-10-CM

## 2021-08-25 PROCEDURE — 99213 OFFICE O/P EST LOW 20 MIN: CPT | Performed by: PHYSICIAN ASSISTANT

## 2021-08-25 ASSESSMENT — ENCOUNTER SYMPTOMS
HEADACHES: 0
PALPITATIONS: 0
ALLERGIC/IMMUNOLOGIC NEGATIVE: 1
EYES NEGATIVE: 1
ENDOCRINE NEGATIVE: 1
NECK STIFFNESS: 0
NAUSEA: 0
ARTHRALGIAS: 0
CARDIOVASCULAR NEGATIVE: 1
NECK PAIN: 0
DIARRHEA: 0
CHILLS: 0
MYALGIAS: 0
FEVER: 0
SHORTNESS OF BREATH: 0
VOMITING: 0
COUGH: 0
WOUND: 1
MUSCULOSKELETAL NEGATIVE: 1
DIZZINESS: 0
BACK PAIN: 0
WEAKNESS: 0
LIGHT-HEADEDNESS: 0
RHINORRHEA: 0
RESPIRATORY NEGATIVE: 1
SORE THROAT: 0
JOINT SWELLING: 0

## 2021-08-25 NOTE — PATIENT INSTRUCTIONS
Patient Education     Understanding Carbuncles    A carbuncle ([id number]) is a painful boil under the skin. It happens when a group of hair follicles become infected. Follicles are the tiny holes from which hair grows out of your skin.  What causes carbuncles?  A carbuncle is caused by an infection with bacteria, usually Staphylococcus aureus. They are common on areas of the body where friction and sweat occur. They usually appear on the back of the neck, back, and thighs. This type of infection can also happen when the skin is injured, such as by a cut or bug bite.  The bacteria that causes carbuncles can spread easily from person to person. People at higher risk for boils are those with diabetes or a weak immune system. Drug users who use needles are also more likely to get them.  Symptoms of carbuncles  A carbuncle starts as a small painful bump. But it can grow quickly. It may become:    Red    Swollen    Tender  Carbuncles may ooze pus. They may also cause fever and a general feeling of illness.  Treatment for carbuncles  A carbuncle may heal on its own in a few weeks. But the pus within it needs to come out first. Treatment options include:    Warm compress. Putting a warm, wet washcloth on the boil will help the pus drain out. You should never try to pop a boil. That can cause the infection to spread.    Surgical drainage. If the boil doesn t drain on its own, your healthcare provider may need to cut into it.    Antibiotics. In some cases, oral antibiotics may be prescribed to fight the infection, especially if the carbuncle returns. You will likely have to take the medicine for 5 to 7 days. You may need to take it longer for a severe case.    Good hygiene. Proper handwashing can prevent boils from spreading and coming back. Also wash things that have been in contact with the carbuncle in hot water. This includes items such as clothing and towels.  Complications of carbuncles  The main complication of a  carbuncle is the spreading of the infection. The bacteria can infect the heart and bone. It can also lead to septic shock, an infection of the entire body.  When to call your healthcare provider  Call your healthcare provider right away if you have any of these:    Fever of 100.4 F (38 C) or higher, or as directed    Redness, swelling, or fluid leaking from a carbuncle that gets worse    Pain that gets worse    Symptoms that don t get better, or get worse    New symptoms  [employer] last reviewed this educational content on 6/1/2019 2000-2021 [employer]. All rights reserved. This information is not intended as a substitute for professional medical care. Always follow your healthcare professional's instructions.

## 2021-08-25 NOTE — PROGRESS NOTES
Chief Complaint:    Chief Complaint   Patient presents with     Lump     under left bump.          Medical Decision Making:    Vital signs reviewed by Osorio Ramos PA-C  BP (!) 141/86 (BP Location: Left arm, Patient Position: Sitting, Cuff Size: Adult Large)   Pulse 66   Temp 98  F (36.7  C) (Oral)   Resp 20   Wt 104.9 kg (231 lb 4 oz)   LMP 07/30/2021   SpO2 100%   Breastfeeding No   BMI 37.32 kg/m      Differential Diagnosis:  Boil, cellulitis     ASSESSMENT:     1. Boil           PLAN:     Rx for Augmentin tonight.  Patient instructed to follow up with PCP in 1 week if symptoms are not improving.  Sooner if symptoms worsen.  Worrisome symptoms discussed with instructions to go to the ED.  Patient verbalized understanding and agreed with this plan.    Labs:     No results found for any visits on 08/25/21.    Current Meds:    Current Outpatient Medications:      amoxicillin-clavulanate (AUGMENTIN) 875-125 MG tablet, Take 1 tablet by mouth 2 times daily for 7 days, Disp: 14 tablet, Rfl: 0     Docosahexaenoic Acid (PRENATAL DHA PO), Take by mouth daily, Disp: , Rfl:      vitamin D3 (CHOLECALCIFEROL) 52388 units capsule, Take 1 capsule (50,000 Units) by mouth once a week, Disp: 8 capsule, Rfl: 1     acetaminophen (TYLENOL) 325 MG tablet, Take 2 tablets (650 mg) by mouth every 4 hours as needed for mild pain (Patient not taking: Reported on 8/25/2021), Disp: 40 tablet, Rfl: 0     Cranberry 400 MG TABS, , Disp: , Rfl:      Cyanocobalamin (VITAMIN B-12 PO), , Disp: , Rfl:      docusate sodium (STOOL SOFTENER) 100 MG tablet, Take 100 mg by mouth every other day (Patient not taking: Reported on 8/25/2021), Disp: , Rfl:      Ferrous Gluconate (IRON 27 PO), , Disp: , Rfl:      ibuprofen (ADVIL/MOTRIN) 200 MG tablet, Take 3 tablets (600 mg) by mouth every 6 hours as needed for moderate pain (Patient not taking: Reported on 8/25/2021), Disp: 40 tablet, Rfl: 0     Omega-3 Fatty Acids (FISH OIL) 1200 MG capsule, Take  1,200 mg by mouth daily (Patient not taking: Reported on 8/25/2021), Disp: , Rfl:      vitamin B complex with vitamin C (VITAMIN  B COMPLEX) tablet, Take 1 tablet by mouth daily (Patient not taking: Reported on 8/25/2021), Disp: , Rfl:     Allergies:  Allergies   Allergen Reactions     No Known Drug Allergies        SUBJECTIVE    HPI: Karen Orellana is an 30 year old female who presents for evaluation and treatment of lesion on the L breast.  Patient has noticed redness and pain for the past 2 days that is worsening.  She denies any fever, or chills.      ROS:      Review of Systems   Constitutional: Negative for chills and fever.   HENT: Negative for congestion, ear pain, rhinorrhea and sore throat.    Eyes: Negative.    Respiratory: Negative.  Negative for cough and shortness of breath.    Cardiovascular: Negative.  Negative for chest pain and palpitations.   Gastrointestinal: Negative for diarrhea, nausea and vomiting.   Endocrine: Negative.    Genitourinary: Negative.    Musculoskeletal: Negative.  Negative for arthralgias, back pain, joint swelling, myalgias, neck pain and neck stiffness.   Skin: Positive for rash and wound.   Allergic/Immunologic: Negative.  Negative for immunocompromised state.   Neurological: Negative for dizziness, weakness, light-headedness and headaches.        Family History   Family History   Problem Relation Age of Onset     Diabetes Father      Asthma Mother      Hypertension Mother      Hypoglycemia Mother      Diabetes Paternal Grandmother      Diabetes Paternal Grandfather      Diabetes Maternal Uncle      Diabetes Maternal Aunt      Hypertension Maternal Grandmother      Breast Cancer Maternal Grandmother        Social History  Social History     Socioeconomic History     Marital status:      Spouse name: Not on file     Number of children: Not on file     Years of education: Not on file     Highest education level: Not on file   Occupational History     Not on file    Tobacco Use     Smoking status: Never Smoker     Smokeless tobacco: Never Used   Substance and Sexual Activity     Alcohol use: Yes     Comment: 1-2 times per week     Drug use: No     Sexual activity: Never     Partners: Male   Other Topics Concern     Parent/sibling w/ CABG, MI or angioplasty before 65F 55M? Not Asked   Social History Narrative     Not on file     Social Determinants of Health     Financial Resource Strain:      Difficulty of Paying Living Expenses:    Food Insecurity:      Worried About Running Out of Food in the Last Year:      Ran Out of Food in the Last Year:    Transportation Needs:      Lack of Transportation (Medical):      Lack of Transportation (Non-Medical):    Physical Activity:      Days of Exercise per Week:      Minutes of Exercise per Session:    Stress:      Feeling of Stress :    Social Connections:      Frequency of Communication with Friends and Family:      Frequency of Social Gatherings with Friends and Family:      Attends Confucianist Services:      Active Member of Clubs or Organizations:      Attends Club or Organization Meetings:      Marital Status:    Intimate Partner Violence:      Fear of Current or Ex-Partner:      Emotionally Abused:      Physically Abused:      Sexually Abused:         Surgical History:  Past Surgical History:   Procedure Laterality Date     EXAM UNDER ANESTHESIA PELVIC N/A 2/6/2019    Procedure: EXAM UNDER ANESTHESIA PELVIC;  Surgeon: Jen Henry MD;  Location:  OR     IRRIGATION AND DEBRIDEMENT FINGER, COMBINED Left 10/18/2018    Procedure: 1.  Sharp incision and drainage, left index finger paronychia.  2.  Left index finger incision and drainage flexor tendon sheath. ;  Surgeon: Marco Tracy MD;  Location:  OR     NO HISTORY OF SURGERY       OPERATIVE HYSTEROSCOPY WITH MORCELLATOR N/A 2/6/2019    Procedure: Exam under Anesthsia, Hysteroscopy, resection of uterine septum with Myosure;  Surgeon: Jen Henry MD;  Location:   OR        Problem List:  Patient Active Problem List   Diagnosis     Iron deficiency anemia     CARDIOVASCULAR SCREENING; LDL GOAL LESS THAN 160     Morbid obesity due to excess calories (H)     Migraine without aura and without status migrainosus, not intractable     Iron deficiency anemia due to chronic blood loss     Perionychia of finger, left           OBJECTIVE:     Vital signs noted and reviewed by Osorio Ramos PA-C  BP (!) 141/86 (BP Location: Left arm, Patient Position: Sitting, Cuff Size: Adult Large)   Pulse 66   Temp 98  F (36.7  C) (Oral)   Resp 20   Wt 104.9 kg (231 lb 4 oz)   LMP 07/30/2021   SpO2 100%   Breastfeeding No   BMI 37.32 kg/m       PEFR:    Physical Exam  Chest:          Comments: Roughly 1 cm in diameter erythematous firm raised area on lateral L breast.  No visible abscess.            Osorio Ramos PA-C  8/25/2021, 6:07 PM

## 2021-09-20 ENCOUNTER — LAB (OUTPATIENT)
Dept: URGENT CARE | Facility: URGENT CARE | Age: 31
End: 2021-09-20
Attending: PHYSICIAN ASSISTANT
Payer: COMMERCIAL

## 2021-09-20 ENCOUNTER — E-VISIT (OUTPATIENT)
Dept: URGENT CARE | Facility: CLINIC | Age: 31
End: 2021-09-20
Payer: COMMERCIAL

## 2021-09-20 DIAGNOSIS — Z20.822 SUSPECTED COVID-19 VIRUS INFECTION: ICD-10-CM

## 2021-09-20 DIAGNOSIS — Z20.822 SUSPECTED COVID-19 VIRUS INFECTION: Primary | ICD-10-CM

## 2021-09-20 LAB — SARS-COV-2 RNA RESP QL NAA+PROBE: NEGATIVE

## 2021-09-20 PROCEDURE — U0005 INFEC AGEN DETEC AMPLI PROBE: HCPCS

## 2021-09-20 PROCEDURE — U0003 INFECTIOUS AGENT DETECTION BY NUCLEIC ACID (DNA OR RNA); SEVERE ACUTE RESPIRATORY SYNDROME CORONAVIRUS 2 (SARS-COV-2) (CORONAVIRUS DISEASE [COVID-19]), AMPLIFIED PROBE TECHNIQUE, MAKING USE OF HIGH THROUGHPUT TECHNOLOGIES AS DESCRIBED BY CMS-2020-01-R: HCPCS

## 2021-09-20 PROCEDURE — 99421 OL DIG E/M SVC 5-10 MIN: CPT | Performed by: PHYSICIAN ASSISTANT

## 2021-09-20 NOTE — PATIENT INSTRUCTIONS
Dear Karen Orellana,    Your symptoms show that you may have coronavirus (COVID-19). This illness can cause fever, cough and trouble breathing. Many people get a mild case and get better on their own. Some people can get very sick.    Will I be tested for COVID-19?  We would like to test you for Covid-19 virus. I have placed orders for this test.     For all employees or close contacts (except [facility] and [facility] - see below), go to your [employer] home page and scroll down to the section that says  You have an appointment that needs to be scheduled  and click the large green button that says  Schedule Now  and follow the steps to find the next available opening.     If you are unable to complete these steps or if you cannot find any available times, please call [phone] to schedule employee testing.     [facility] employees or close contacts, please call [phone].   [place] ([facility]) employees or close contacts call [phone].    Return to work/school/ guidance:  Please let your workplace manager and staffing office know when your quarantine ends     We can t give you an exact date as it depends on the above. You can calculate this on your own or work with your manager/staffing office to calculate this. (For example if you were exposed on 10/4, you would have to quarantine for 14 full days. That would be through 10/18. You could return on 10/19.)      If you receive a positive COVID-19 test result, follow the guidance of the those who are giving you the results. Usually the return to work is 10 (or in some cases 20 days from symptom onset.) If you work at [employer] [place], you must also be cleared by Employee Occupational Health and Safety to return to work.        If you receive a negative COVID-19 test result and did not have a high risk exposure to someone with a known positive COVID-19 test, you can return to work once you're free of fever for 24 hours without fever-reducing medication  and your symptoms are improving or resolved.      If you receive a negative COVID-19 test and If you had a high risk exposure to someone who has tested positive for COVID-19 then you can return to work 14 days after your last contact with the positive individual    Note: If you have ongoing exposure to the covid positive person, this quarantine period may be more than 14 days. (For example, if you are continued to be exposed to your child who tested positive and cannot isolate from them, then the quarantine of 7-14 days can't start until your child is no longer contagious. This is typically 10 days from onset of the child's symptoms. So the total duration may be 17-24 days in this case.)    Sign up for [facility].   We know it's scary to hear that you might have COVID-19. We want to track your symptoms to make sure you're okay over the next 2 weeks. Please look for an email from [employer]--this is a free, online program that we'll use to keep in touch. To sign up, follow the link in the email you will receive. Learn more at [email or web address]    How can I take care of myself?    Get lots of rest. Drink extra fluids (unless a doctor has told you not to)    Take Tylenol (acetaminophen) or ibuprofen for fever or pain. If you have liver or kidney problems, ask your family doctor if it's okay to take Tylenol o ibuprofen    If you have other health problems (like cancer, heart failure, an organ transplant or severe kidney disease): Call your specialty clinic if you don't feel better in the next 2 days.    Know when to call 911. Emergency warning signs include:  o Trouble breathing or shortness of breath  o Pain or pressure in the chest that doesn't go away  o Feeling confused like you haven't felt before, or not being able to wake up  o Bluish-colored lips or face    Where can I get more information?   [facility] - About COVID-19:   [email or web address]    CDC - What to Do If You're  Sick:   www.cdc.gov/coronavirus/2019-ncov/about/steps-when-sick.html    September 20, 2021  RE:  Karen Orellana                                                                                                                  [address] [facility] [address]      To whom it may concern:    I evaluated Karen Orellana on September 20, 2021. Karen Orellana should be excused from work/school.     They should let their workplace manager and staffing office know when their quarantine ends.    We can not give an exact date as it depends on the information below. They can calculate this on their own or work with their manager/staffing office to calculate this. (For example if they were exposed on 10/04, they would have to quarantine for 14 full days. That would be through 10/18. They could return on 10/19.)    Quarantine Guidelines:      If patient receives a positive COVID-19 test result, they should follow the guidance of those who are giving the results. Usually the return to work is 10 (or in some cases 20 days from symptom onset.) If they work at [employer], they must be cleared by Employee Occupational Health and Safety to return to work.        If patient receives a negative COVID-19 test result and did not have a high risk exposure to someone with a known positive COVID-19 test, they can return to work once they're free of fever for 24 hours without fever-reducing medication and their symptoms are improving or resolved.      If patient receives a negative COVID-19 test and if they had a high risk exposure to someone who has tested positive for COVID-19 then they can return to work 14 days after their last contact with the positive individual    Note: If there is ongoing exposure to the covid positive person, this quarantine period may be longer than 14 days. (For example, if they are continually exposed to their child, who tested positive and cannot isolate from them, then the quarantine of 7-14  days can't start until their child is no longer contagious. This is typically 10 days from onset to the child's symptoms. So the total duration may be 17-24 days in this case.)     Sincerely,  Audra Amador PA-C

## 2021-09-23 ENCOUNTER — APPOINTMENT (OUTPATIENT)
Dept: URGENT CARE | Facility: URGENT CARE | Age: 31
End: 2021-09-23
Payer: COMMERCIAL

## 2021-09-23 ENCOUNTER — LAB REQUISITION (OUTPATIENT)
Dept: LAB | Facility: CLINIC | Age: 31
End: 2021-09-23

## 2021-09-23 PROCEDURE — U0003 INFECTIOUS AGENT DETECTION BY NUCLEIC ACID (DNA OR RNA); SEVERE ACUTE RESPIRATORY SYNDROME CORONAVIRUS 2 (SARS-COV-2) (CORONAVIRUS DISEASE [COVID-19]), AMPLIFIED PROBE TECHNIQUE, MAKING USE OF HIGH THROUGHPUT TECHNOLOGIES AS DESCRIBED BY CMS-2020-01-R: HCPCS | Performed by: INTERNAL MEDICINE

## 2021-09-24 LAB — SARS-COV-2 RNA RESP QL NAA+PROBE: NEGATIVE

## 2021-10-05 ENCOUNTER — IMMUNIZATION (OUTPATIENT)
Dept: NURSING | Facility: CLINIC | Age: 31
End: 2021-10-05
Payer: COMMERCIAL

## 2021-10-05 PROCEDURE — 0004A PR COVID VAC PFIZER DIL RECON 30 MCG/0.3 ML IM: CPT

## 2021-10-05 PROCEDURE — 91300 PR COVID VAC PFIZER DIL RECON 30 MCG/0.3 ML IM: CPT

## 2021-11-05 ENCOUNTER — OFFICE VISIT (OUTPATIENT)
Dept: URGENT CARE | Facility: URGENT CARE | Age: 31
End: 2021-11-05
Payer: COMMERCIAL

## 2021-11-05 VITALS
WEIGHT: 235.2 LBS | DIASTOLIC BLOOD PRESSURE: 84 MMHG | OXYGEN SATURATION: 99 % | SYSTOLIC BLOOD PRESSURE: 136 MMHG | BODY MASS INDEX: 37.96 KG/M2 | HEART RATE: 82 BPM | TEMPERATURE: 98.3 F

## 2021-11-05 DIAGNOSIS — H10.023 PINK EYE DISEASE OF BOTH EYES: Primary | ICD-10-CM

## 2021-11-05 PROCEDURE — 99213 OFFICE O/P EST LOW 20 MIN: CPT | Performed by: PHYSICIAN ASSISTANT

## 2021-11-05 RX ORDER — POLYMYXIN B SULFATE AND TRIMETHOPRIM 1; 10000 MG/ML; [USP'U]/ML
2 SOLUTION OPHTHALMIC EVERY 4 HOURS
Qty: 5 ML | Refills: 0 | Status: SHIPPED | OUTPATIENT
Start: 2021-11-05 | End: 2021-11-05

## 2021-11-05 RX ORDER — POLYMYXIN B SULFATE AND TRIMETHOPRIM 1; 10000 MG/ML; [USP'U]/ML
2 SOLUTION OPHTHALMIC EVERY 4 HOURS
Qty: 5 ML | Refills: 0 | Status: SHIPPED | OUTPATIENT
Start: 2021-11-05 | End: 2022-01-26

## 2021-11-05 ASSESSMENT — ENCOUNTER SYMPTOMS
VOMITING: 0
HEADACHES: 0
WOUND: 0
RHINORRHEA: 0
CHILLS: 0
FEVER: 0
NAUSEA: 0
RESPIRATORY NEGATIVE: 1
SORE THROAT: 0
DIARRHEA: 0
JOINT SWELLING: 0
NECK PAIN: 0
LIGHT-HEADEDNESS: 0
PHOTOPHOBIA: 0
MUSCULOSKELETAL NEGATIVE: 1
PALPITATIONS: 0
ENDOCRINE NEGATIVE: 1
MYALGIAS: 0
EYE PAIN: 1
WEAKNESS: 0
SHORTNESS OF BREATH: 0
EYE REDNESS: 1
EYE ITCHING: 1
BACK PAIN: 0
NECK STIFFNESS: 0
DIZZINESS: 0
COUGH: 0
EYE DISCHARGE: 1
ARTHRALGIAS: 0
CARDIOVASCULAR NEGATIVE: 1
ALLERGIC/IMMUNOLOGIC NEGATIVE: 1

## 2021-11-05 NOTE — PROGRESS NOTES
Chief Complaint:      Chief Complaint   Patient presents with     Conjunctivitis     possible pink eye right eye, red,swollen, discharge, watery ,itchy       Medical Decision Making:    Differential Diagnosis:  Eye Problem: Bacterial conjunctivitis  Viral conjunctivitis  Allergic conjunctivitis  Stye (external)  Stye (internal)  Corneal abrasion     ASSESSMENT     1. Pink eye disease of both eyes         PLAN  Rx for Polytrim drops  Artifical tears for irritation  Warm compresses with baby shampoo for mattering.   If symptoms are not improving follow up with your eye doctor in 2-3 days.  See your eye doctor immediately if symptoms worsen.  Worrisome symptoms discussed with instructions to go to the ED.  Patient verbalized understanding and agreed with this plan.    Labs:    No results found for any visits on 11/05/21.       Current Meds    Current Outpatient Medications:      Docosahexaenoic Acid (PRENATAL DHA PO), Take by mouth daily, Disp: , Rfl:      vitamin D3 (CHOLECALCIFEROL) 74665 units capsule, Take 1 capsule (50,000 Units) by mouth once a week, Disp: 8 capsule, Rfl: 1     acetaminophen (TYLENOL) 325 MG tablet, Take 2 tablets (650 mg) by mouth every 4 hours as needed for mild pain (Patient not taking: Reported on 8/25/2021), Disp: 40 tablet, Rfl: 0     Cranberry 400 MG TABS, , Disp: , Rfl:      Cyanocobalamin (VITAMIN B-12 PO), , Disp: , Rfl:      docusate sodium (STOOL SOFTENER) 100 MG tablet, Take 100 mg by mouth every other day (Patient not taking: Reported on 8/25/2021), Disp: , Rfl:      Ferrous Gluconate (IRON 27 PO), , Disp: , Rfl:      ibuprofen (ADVIL/MOTRIN) 200 MG tablet, Take 3 tablets (600 mg) by mouth every 6 hours as needed for moderate pain (Patient not taking: Reported on 8/25/2021), Disp: 40 tablet, Rfl: 0     Omega-3 Fatty Acids (FISH OIL) 1200 MG capsule, Take 1,200 mg by mouth daily (Patient not taking: Reported on 8/25/2021), Disp: , Rfl:      trimethoprim-polymyxin b (POLYTRIM)  99381-1.1 UNIT/ML-% ophthalmic solution, Place 2 drops into the right eye every 4 hours, Disp: 5 mL, Rfl: 0     vitamin B complex with vitamin C (VITAMIN  B COMPLEX) tablet, Take 1 tablet by mouth daily (Patient not taking: Reported on 8/25/2021), Disp: , Rfl:     Allergies  Allergies   Allergen Reactions     No Known Drug Allergies          SUBJECTIVE    HPI: Karen Orellana is a 31 year old female presenting with both eyes discharge, mattering, pain, redness  for the past 1 days.  There has not been exposure to pink eye.  There has not been trauma to the eye.    Karen Orellana does not wear contact lenses.    No problems with vision, or eye pain.     No recent viral illness or seasonal allergies.    ROS:     Review of Systems   Constitutional: Negative for chills and fever.   HENT: Negative for congestion, ear pain, rhinorrhea and sore throat.    Eyes: Positive for pain, discharge, redness and itching. Negative for photophobia and visual disturbance.   Respiratory: Negative.  Negative for cough and shortness of breath.    Cardiovascular: Negative.  Negative for chest pain and palpitations.   Gastrointestinal: Negative for diarrhea, nausea and vomiting.   Endocrine: Negative.    Genitourinary: Negative.    Musculoskeletal: Negative.  Negative for arthralgias, back pain, joint swelling, myalgias, neck pain and neck stiffness.   Skin: Negative.  Negative for rash and wound.   Allergic/Immunologic: Negative.  Negative for immunocompromised state.   Neurological: Negative for dizziness, weakness, light-headedness and headaches.           Family History   Family History   Problem Relation Age of Onset     Diabetes Father      Asthma Mother      Hypertension Mother      Hypoglycemia Mother      Diabetes Paternal Grandmother      Diabetes Paternal Grandfather      Diabetes Maternal Uncle      Diabetes Maternal Aunt      Hypertension Maternal Grandmother      Breast Cancer Maternal Grandmother         Problem  history  Patient Active Problem List   Diagnosis     Iron deficiency anemia     CARDIOVASCULAR SCREENING; LDL GOAL LESS THAN 160     Morbid obesity due to excess calories (H)     Migraine without aura and without status migrainosus, not intractable     Iron deficiency anemia due to chronic blood loss     Perionychia of finger, left           Social History  Social History     Socioeconomic History     Marital status:      Spouse name: Not on file     Number of children: Not on file     Years of education: Not on file     Highest education level: Not on file   Occupational History     Not on file   Tobacco Use     Smoking status: Never Smoker     Smokeless tobacco: Never Used   Substance and Sexual Activity     Alcohol use: Yes     Comment: 1-2 times per week     Drug use: No     Sexual activity: Never     Partners: Male   Other Topics Concern     Parent/sibling w/ CABG, MI or angioplasty before 65F 55M? Not Asked   Social History Narrative     Not on file     Social Determinants of Health     Financial Resource Strain:      Difficulty of Paying Living Expenses:    Food Insecurity:      Worried About Running Out of Food in the Last Year:      Ran Out of Food in the Last Year:    Transportation Needs:      Lack of Transportation (Medical):      Lack of Transportation (Non-Medical):    Physical Activity:      Days of Exercise per Week:      Minutes of Exercise per Session:    Stress:      Feeling of Stress :    Social Connections:      Frequency of Communication with Friends and Family:      Frequency of Social Gatherings with Friends and Family:      Attends Scientologist Services:      Active Member of Clubs or Organizations:      Attends Club or Organization Meetings:      Marital Status:    Intimate Partner Violence:      Fear of Current or Ex-Partner:      Emotionally Abused:      Physically Abused:      Sexually Abused:         OBJECTIVE     Vital signs reviewed by Osorio Ramos PA-C  /84 (BP  Location: Left arm, Patient Position: Sitting, Cuff Size: Adult Regular)   Pulse 82   Temp 98.3  F (36.8  C) (Oral)   Wt 106.7 kg (235 lb 3.2 oz)   SpO2 99%   BMI 37.96 kg/m       Physical Exam  Vitals and nursing note reviewed.   Constitutional:       General: She is not in acute distress.     Appearance: She is well-developed. She is not ill-appearing, toxic-appearing or diaphoretic.   HENT:      Head: Normocephalic and atraumatic.      Right Ear: Tympanic membrane and external ear normal. No drainage, swelling or tenderness. Tympanic membrane is not perforated, erythematous, retracted or bulging.      Left Ear: Tympanic membrane and external ear normal. No drainage, swelling or tenderness. Tympanic membrane is not perforated, erythematous, retracted or bulging.      Nose: No mucosal edema, congestion or rhinorrhea.      Right Sinus: No maxillary sinus tenderness or frontal sinus tenderness.      Left Sinus: No maxillary sinus tenderness or frontal sinus tenderness.      Mouth/Throat:      Pharynx: No pharyngeal swelling, oropharyngeal exudate, posterior oropharyngeal erythema or uvula swelling.      Tonsils: No tonsillar abscesses.   Eyes:      General:         Right eye: No foreign body, discharge or hordeolum.         Left eye: No foreign body, discharge or hordeolum.      Conjunctiva/sclera:      Right eye: Right conjunctiva is injected. Exudate present. No chemosis or hemorrhage.     Left eye: Left conjunctiva is injected. No chemosis, exudate or hemorrhage.     Pupils: Pupils are equal, round, and reactive to light.   Neck:      Trachea: Trachea normal.   Cardiovascular:      Rate and Rhythm: Normal rate and regular rhythm.      Heart sounds: Normal heart sounds, S1 normal and S2 normal. No murmur heard.   No friction rub. No gallop.    Pulmonary:      Effort: Pulmonary effort is normal. No respiratory distress.      Breath sounds: Normal breath sounds. No decreased breath sounds, wheezing, rhonchi or  rales.   Abdominal:      General: Bowel sounds are normal. There is no distension.      Palpations: Abdomen is soft. Abdomen is not rigid. There is no mass.      Tenderness: There is no abdominal tenderness. There is no guarding or rebound.   Musculoskeletal:      Cervical back: Full passive range of motion without pain, normal range of motion and neck supple.   Lymphadenopathy:      Cervical: No cervical adenopathy.   Skin:     General: Skin is warm and dry.   Neurological:      Mental Status: She is alert and oriented to person, place, and time.      Cranial Nerves: No cranial nerve deficit.      Deep Tendon Reflexes: Reflexes are normal and symmetric.   Psychiatric:         Behavior: Behavior normal. Behavior is cooperative.         Thought Content: Thought content normal.         Judgment: Judgment normal.            Osorio Ramos PA-C  11/5/2021, 10:45 AM

## 2021-12-31 ENCOUNTER — LAB REQUISITION (OUTPATIENT)
Dept: LAB | Facility: CLINIC | Age: 31
End: 2021-12-31

## 2021-12-31 ENCOUNTER — APPOINTMENT (OUTPATIENT)
Dept: URGENT CARE | Facility: URGENT CARE | Age: 31
End: 2021-12-31
Payer: COMMERCIAL

## 2021-12-31 PROCEDURE — U0005 INFEC AGEN DETEC AMPLI PROBE: HCPCS | Performed by: INTERNAL MEDICINE

## 2022-01-01 LAB — SARS-COV-2 RNA RESP QL NAA+PROBE: NEGATIVE

## 2022-01-04 ENCOUNTER — OFFICE VISIT (OUTPATIENT)
Dept: FAMILY MEDICINE | Facility: CLINIC | Age: 32
End: 2022-01-04
Payer: COMMERCIAL

## 2022-01-04 VITALS
TEMPERATURE: 98.2 F | DIASTOLIC BLOOD PRESSURE: 68 MMHG | OXYGEN SATURATION: 100 % | SYSTOLIC BLOOD PRESSURE: 118 MMHG | HEART RATE: 65 BPM | WEIGHT: 232 LBS | HEIGHT: 66 IN | BODY MASS INDEX: 37.28 KG/M2

## 2022-01-04 DIAGNOSIS — U07.1 COVID-19 VIRUS INFECTION: Primary | ICD-10-CM

## 2022-01-04 DIAGNOSIS — R68.89 FLU-LIKE SYMPTOMS: ICD-10-CM

## 2022-01-04 DIAGNOSIS — J02.9 PHARYNGITIS, UNSPECIFIED ETIOLOGY: ICD-10-CM

## 2022-01-04 LAB
DEPRECATED S PYO AG THROAT QL EIA: NEGATIVE
FLUAV AG SPEC QL IA: NEGATIVE
FLUBV AG SPEC QL IA: NEGATIVE
SARS-COV-2 RNA RESP QL NAA+PROBE: POSITIVE

## 2022-01-04 PROCEDURE — U0005 INFEC AGEN DETEC AMPLI PROBE: HCPCS | Performed by: INTERNAL MEDICINE

## 2022-01-04 PROCEDURE — 87651 STREP A DNA AMP PROBE: CPT | Performed by: INTERNAL MEDICINE

## 2022-01-04 PROCEDURE — U0003 INFECTIOUS AGENT DETECTION BY NUCLEIC ACID (DNA OR RNA); SEVERE ACUTE RESPIRATORY SYNDROME CORONAVIRUS 2 (SARS-COV-2) (CORONAVIRUS DISEASE [COVID-19]), AMPLIFIED PROBE TECHNIQUE, MAKING USE OF HIGH THROUGHPUT TECHNOLOGIES AS DESCRIBED BY CMS-2020-01-R: HCPCS | Performed by: INTERNAL MEDICINE

## 2022-01-04 PROCEDURE — 87804 INFLUENZA ASSAY W/OPTIC: CPT | Performed by: INTERNAL MEDICINE

## 2022-01-04 PROCEDURE — 99213 OFFICE O/P EST LOW 20 MIN: CPT | Performed by: INTERNAL MEDICINE

## 2022-01-04 ASSESSMENT — MIFFLIN-ST. JEOR: SCORE: 1784.1

## 2022-01-04 NOTE — LETTER
REPORT OF WORK ABILITY    [address] [address]  [phone]      PATIENT DATA    Employee Name: Karen Orellana      : 1990     #: [id number]    Today's date: 2022  Date of first visit: 2022    PROVIDER EVALUATION: Please fill in as needed.  Please give copy to employee for employer.    1. Diagnosis: Covid-19 infection                        Flu-like symptoms                        Acute pharyngitis    2. No work from 2022 to 2022.  3. Return to work date: 2022   ** WITH RESTRICTIONS? No restrictions      RESTRICTIONS: Unlimited unless listed.  Restrictions apply to home and leisure also.  If work restrictions is not available, the employee is totally disabled.    Maximum Medical Improvement (Date): Tentative date: 2022  Any Permanent Partial Disability? 0%      Medical Examiner: Willard Powell MD          License or registration: [id number]    Next appointment: 2 weeks or as needed.    CC: Employer, Managed Care Plan/Payor, Patient

## 2022-01-04 NOTE — LETTER
REPORT OF WORK ABILITY    [address] [address]  [phone]      PATIENT DATA    Employee Name: Karen Orellana      : 1990     #: [id number]    Today's date: 2022  Date of first visit: 2022    PROVIDER EVALUATION: Please fill in as needed.  Please give copy to employee for employer.    1. Diagnosis: Flu-like symptoms                        Acute pharyngitis                        Suspect Covid-19 infection.  2. No work from 2022 to 2022.  5. Return to work date: 2022   ** WITH RESTRICTIONS? No restrictions      RESTRICTIONS: Unlimited unless listed.  Restrictions apply to home and leisure also.  If work restrictions is not available, the employee is totally disabled.    Maximum Medical Improvement (Date): Tentative date: 2022  Any Permanent Partial Disability? 0%      Medical Examiner: Willard Powell MD          License or registration: [id number]    Next appointment: 1 week    CC: Employer, Managed Care Plan/Payor, Patient

## 2022-01-04 NOTE — PROGRESS NOTES
[facility] Internal Medicine Progress Note           Assessment and Plan:     COVID-19 virus infection  Recommend off work from 1/4/2022 to 1/16/2022. RTW on 1/17/2022. If patient develops complications, then I recommend symptomatic treatment.  - Symptomatic; Yes; 1/3/2022 COVID-19 Virus (Coronavirus) by PCR Nose    Flu-like symptoms  Negative test.  - Influenza A/B antigen    Pharyngitis, unspecified etiology  Negative tests.  - Streptococcus A Rapid Screen w/Reflex to PCR - Clinic Collect  - Group A Streptococcus PCR Throat Swab           Interval History:     Karen is a 31 year old who presents for the following health issues:     RESPIRATORY SYMPTOMS      Duration: one day    Description  rhinorrhea, sore throat, cough, chills and fatigue/malaise    Severity: mild    Accompanying signs and symptoms: As above.    History (predisposing factors):  Possible exposure to COVID-19 (brother)    Alleviating factors: None    Therapies tried and outcome:  None. Nasal PCR test for COVID-19 last 12/31/2021 is negative. Patient is fully vaccinated against COVID-19.              Significant Problems:   Patient Active Problem List   Diagnosis     Iron deficiency anemia     CARDIOVASCULAR SCREENING; LDL GOAL LESS THAN 160     Morbid obesity due to excess calories (H)     Migraine without aura and without status migrainosus, not intractable     Iron deficiency anemia due to chronic blood loss     Perionychia of finger, left              Review of Systems:   CONSTITUTIONAL:POSITIVE  for chills and fatigue  INTEGUMENTARY/SKIN: NEGATIVE for worrisome rashes, moles or lesions  EYES: NEGATIVE for vision changes or irritation  ENT/MOUTH: POSITIVE for sore throat  RESP:POSITIVE for cough-non productive  BREAST: NEGATIVE for masses, tenderness or discharge  CV: NEGATIVE for chest pain, palpitations or peripheral edema  GI: NEGATIVE for nausea, abdominal pain, heartburn, or change in bowel habits  : NEGATIVE for frequency,  "dysuria, or hematuria  MUSCULOSKELETAL: NEGATIVE for significant arthralgias or myalgia  NEURO: NEGATIVE for weakness, dizziness or paresthesias  ENDOCRINE: NEGATIVE for temperature intolerance, skin/hair changes  HEME: NEGATIVE for bleeding problems  PSYCHIATRIC: NEGATIVE for changes in mood or affect            Medications:     Current Outpatient Medications   Medication Sig     acetaminophen (TYLENOL) 325 MG tablet Take 2 tablets (650 mg) by mouth every 4 hours as needed for mild pain (Patient not taking: Reported on 8/25/2021)     Cranberry 400 MG TABS  (Patient not taking: Reported on 8/25/2021)     Cyanocobalamin (VITAMIN B-12 PO)  (Patient not taking: Reported on 8/25/2021)     Docosahexaenoic Acid (PRENATAL DHA PO) Take by mouth daily     docusate sodium (STOOL SOFTENER) 100 MG tablet Take 100 mg by mouth every other day (Patient not taking: Reported on 8/25/2021)     Ferrous Gluconate (IRON 27 PO)  (Patient not taking: Reported on 8/25/2021)     ibuprofen (ADVIL/MOTRIN) 200 MG tablet Take 3 tablets (600 mg) by mouth every 6 hours as needed for moderate pain (Patient not taking: Reported on 8/25/2021)     Omega-3 Fatty Acids (FISH OIL) 1200 MG capsule Take 1,200 mg by mouth daily (Patient not taking: Reported on 8/25/2021)     trimethoprim-polymyxin b (POLYTRIM) 57910-4.1 UNIT/ML-% ophthalmic solution Place 2 drops into the right eye every 4 hours     vitamin B complex with vitamin C (VITAMIN  B COMPLEX) tablet Take 1 tablet by mouth daily (Patient not taking: Reported on 8/25/2021)     vitamin D3 (CHOLECALCIFEROL) 57689 units capsule Take 1 capsule (50,000 Units) by mouth once a week     No current facility-administered medications for this visit.             Physical Exam:   /68   Pulse 65   Temp 98.2  F (36.8  C)   Ht 1.676 m (5' 6\")   Wt 105.2 kg (232 lb)   SpO2 100%   BMI 37.45 kg/m    Constitutional: fatigued, alert, cooperative and no apparent distress  Eyes: extra-ocular muscles intact " and sclera clear  ENT: normocepalic, without obvious abnormality with enlarged and erythematous tonsils.  Lungs: no increased work of breathing, good air exchange, no retractions and harsh breath sounds.  Cardiovascular: regular rate and rhythm  Neurologic: Motor Exam:  Motor exam is symmetrical 5 out of 5 all extremities bilaterally  Neuropsychiatric: Normal affect, mood, orientation, memory and insight.          Data:     Component Ref Range & Units 1 d ago   (1/4/22) 5 d ago   (12/31/21) 3 mo ago   (9/23/21) 3 mo ago   (9/20/21)     SARS CoV2 PCR Negative, Testing sent to reference lab. Results will be returned via unsolicited result Positive Abnormal   Negative CM  Negative R, CM  Negative R, CM    Comment: POSITIVE: SARS-CoV-2 (COVID-19) RNA detected, presumed positive.   Resulting Agency  [facility]             Narrative  Performed by: [facility]  Testing was performed using the Xpert Xpress SARS-CoV-2 Assay on the   Cepheid Gene-Xpert Instrument Systems. Additional information about   this Emergency Use Authorization (EUA) assay can be found via the Lab   Guide. This test should be ordered for the detection of SARS-CoV-2 in   individuals who meet SARS-CoV-2 clinical and/or epidemiological   criteria. Test performance is unknown in asymptomatic patients. This   test is for in vitro diagnostic use under the FDA EUA for   laboratories certified under CLIA to perform high complexity testing.   This test has not been FDA cleared or approved. A negative result   does not rule out the presence of PCR inhibitors in the specimen or   target RNA in concentration below the limit of detection for the   assay. The possibility of a false negative should be considered if   the patient's recent exposure or clinical presentation suggests   COVID-19. This test was validated by the [facility]. This laboratory is certified under   the Clinical Laboratory Improvement  Amendments of 1988 (CLIA-88) as   qualified to perform high complexity laboratory testing.       Specimen Collected: 01/04/22 11:43 AM Last Resulted: 01/04/22  9:25 PM              Group A strep by PCR Not Detected Not Detected     Resulting Agency  [facility]             Narrative  Performed by: TAYLOR  The Xpert Xpress Strep A test, performed on the Phorm  Instrument Systems, is a rapid, qualitative in vitro diagnostic test for the detection of Streptococcus pyogenes (Group A ß-hemolytic Streptococcus, Strep A) in throat swab specimens from patients with signs and symptoms of pharyngitis. The Xpert Xpress Strep A test can be used as an aid in the diagnosis of Group A Streptococcal pharyngitis. The assay is not intended to monitor treatment for Group A Streptococcus infections. The Xpert Xpress Strep A test utilizes an automated real-time polymerase chain reaction (PCR) to detect Streptococcus pyogenes DNA.      Specimen Collected: 01/04/22 11:43 AM Last Resulted: 01/05/22 12:07 AM           Influenza A antigen Negative Negative      Influenza B antigen Negative Negative    Resulting Agency  [facility]             Narrative  Performed by: [facility]  Test results must be correlated with clinical data. If necessary, results should be confirmed by a molecular assay or viral culture.      Specimen Collected: 01/04/22 11:42 AM Last Resulted: 01/04/22  1:21 PM               Disposition:  Follow-up in 4 weeks.    Willard Powell MD  Internal Medicine  [facility] Team

## 2022-01-05 LAB — GROUP A STREP BY PCR: NOT DETECTED

## 2022-02-01 ENCOUNTER — PATIENT OUTREACH (OUTPATIENT)
Dept: ONCOLOGY | Facility: CLINIC | Age: 32
End: 2022-02-01
Payer: COMMERCIAL

## 2022-02-01 PROBLEM — E28.2 PCOS (POLYCYSTIC OVARIAN SYNDROME): Status: ACTIVE | Noted: 2022-02-01

## 2022-02-01 PROBLEM — Z83.3 FAMILY HISTORY OF DIABETES MELLITUS: Status: ACTIVE | Noted: 2022-02-01

## 2022-02-01 PROBLEM — F41.9 ANXIETY: Status: ACTIVE | Noted: 2022-02-01

## 2022-02-01 PROBLEM — Z80.3 FAMILY HISTORY OF MALIGNANT NEOPLASM OF BREAST: Status: ACTIVE | Noted: 2022-02-01

## 2022-03-09 ENCOUNTER — OFFICE VISIT (OUTPATIENT)
Dept: FAMILY MEDICINE | Facility: CLINIC | Age: 32
End: 2022-03-09
Payer: COMMERCIAL

## 2022-03-09 VITALS
DIASTOLIC BLOOD PRESSURE: 86 MMHG | BODY MASS INDEX: 39.35 KG/M2 | SYSTOLIC BLOOD PRESSURE: 135 MMHG | OXYGEN SATURATION: 100 % | HEART RATE: 76 BPM | RESPIRATION RATE: 10 BRPM | WEIGHT: 243.8 LBS | TEMPERATURE: 98.2 F

## 2022-03-09 DIAGNOSIS — E66.01 MORBID OBESITY DUE TO EXCESS CALORIES (H): ICD-10-CM

## 2022-03-09 DIAGNOSIS — E28.2 PCOS (POLYCYSTIC OVARIAN SYNDROME): ICD-10-CM

## 2022-03-09 DIAGNOSIS — F41.9 ANXIETY: Primary | ICD-10-CM

## 2022-03-09 DIAGNOSIS — N92.0 SPOTTING: ICD-10-CM

## 2022-03-09 DIAGNOSIS — F40.243 ANXIETY WITH FLYING: ICD-10-CM

## 2022-03-09 DIAGNOSIS — L73.2 HIDRADENITIS SUPPURATIVA: ICD-10-CM

## 2022-03-09 LAB — HCG UR QL: NEGATIVE

## 2022-03-09 PROCEDURE — 99214 OFFICE O/P EST MOD 30 MIN: CPT | Performed by: NURSE PRACTITIONER

## 2022-03-09 PROCEDURE — 81025 URINE PREGNANCY TEST: CPT | Performed by: NURSE PRACTITIONER

## 2022-03-09 RX ORDER — SERTRALINE HYDROCHLORIDE 100 MG/1
100 TABLET, FILM COATED ORAL DAILY
Qty: 90 TABLET | Refills: 3 | Status: SHIPPED | OUTPATIENT
Start: 2022-03-09 | End: 2022-03-14

## 2022-03-09 RX ORDER — CLINDAMYCIN PHOSPHATE 11.9 MG/ML
SOLUTION TOPICAL 2 TIMES DAILY
Qty: 60 ML | Refills: 3 | Status: SHIPPED | OUTPATIENT
Start: 2022-03-09 | End: 2023-01-20

## 2022-03-09 RX ORDER — CLINDAMYCIN PHOSPHATE 11.9 MG/ML
SOLUTION TOPICAL 2 TIMES DAILY
Qty: 60 ML | Refills: 3 | Status: SHIPPED | OUTPATIENT
Start: 2022-03-09 | End: 2022-03-09

## 2022-03-09 RX ORDER — LORAZEPAM 0.5 MG/1
.5-1 TABLET ORAL EVERY 6 HOURS PRN
Qty: 10 TABLET | Refills: 0 | Status: SHIPPED | OUTPATIENT
Start: 2022-03-09

## 2022-03-09 ASSESSMENT — ANXIETY QUESTIONNAIRES
GAD7 TOTAL SCORE: 3
GAD7 TOTAL SCORE: 3
2. NOT BEING ABLE TO STOP OR CONTROL WORRYING: NOT AT ALL
7. FEELING AFRAID AS IF SOMETHING AWFUL MIGHT HAPPEN: SEVERAL DAYS
5. BEING SO RESTLESS THAT IT IS HARD TO SIT STILL: NOT AT ALL
1. FEELING NERVOUS, ANXIOUS, OR ON EDGE: SEVERAL DAYS
GAD7 TOTAL SCORE: 3
6. BECOMING EASILY ANNOYED OR IRRITABLE: SEVERAL DAYS
4. TROUBLE RELAXING: NOT AT ALL
7. FEELING AFRAID AS IF SOMETHING AWFUL MIGHT HAPPEN: SEVERAL DAYS
3. WORRYING TOO MUCH ABOUT DIFFERENT THINGS: NOT AT ALL

## 2022-03-09 ASSESSMENT — COLUMBIA-SUICIDE SEVERITY RATING SCALE - C-SSRS
1. WITHIN THE PAST MONTH, HAVE YOU WISHED YOU WERE DEAD OR WISHED YOU COULD GO TO SLEEP AND NOT WAKE UP?: NO
2. IN THE PAST MONTH, HAVE YOU ACTUALLY HAD ANY THOUGHTS OF KILLING YOURSELF?: NO
6. HAVE YOU EVER DONE ANYTHING, STARTED TO DO ANYTHING, OR PREPARED TO DO ANYTHING TO END YOUR LIFE?: NO

## 2022-03-09 ASSESSMENT — PATIENT HEALTH QUESTIONNAIRE - PHQ9
SUM OF ALL RESPONSES TO PHQ QUESTIONS 1-9: 16
SUM OF ALL RESPONSES TO PHQ QUESTIONS 1-9: 16
10. IF YOU CHECKED OFF ANY PROBLEMS, HOW DIFFICULT HAVE THESE PROBLEMS MADE IT FOR YOU TO DO YOUR WORK, TAKE CARE OF THINGS AT HOME, OR GET ALONG WITH OTHER PEOPLE: SOMEWHAT DIFFICULT

## 2022-03-09 NOTE — PROGRESS NOTES
Assessment & Plan     Anxiety  Not yet improved.  Will increase to 100  Mg.  Encouraged therapy.  Follow up 4 weeks.   - sertraline (ZOLOFT) 100 MG tablet; Take 1 tablet (100 mg) by mouth daily Take 1/2 tablet (25 mg) for 1 week, then increase to 1 tablet (50 mg) orally daily    Spotting  Negative UPT.  Likely beginning of normal menses  - HCG Qual, Urine ([id number]); Future  - HCG Qual, Urine ([id number])    Morbid obesity due to excess calories (H)  - Comprehensive Weight Management; Future    Hidradenitis suppurativa  Discussed can use topical but treatment can be complicated so I do recommend seeing dermatology.   - Adult Dermatology Referral; Future  - Comprehensive Weight Management; Future  - clindamycin (CLEOCIN T) 1 % external solution; Apply topically 2 times daily    PCOS (polycystic ovarian syndrome)  - Comprehensive Weight Management; Future    Anxiety with flying  For upcoming flight.   - LORazepam (ATIVAN) 0.5 MG tablet; Take 1-2 tablets (0.5-1 mg) by mouth every 6 hours as needed for anxiety For flight    Prescription drug management  25 minutes spent on the date of the encounter doing chart review, history and exam, documentation and further activities per the note       Depression Screening Follow Up    PHQ 3/9/2022   PHQ-9 Total Score 16   Q9: Thoughts of better off dead/self-harm past 2 weeks Several days   F/U: Thoughts of suicide or self-harm No   F/U: Safety concerns No       C-SSRS (Brief Redig) 3/9/2022   Within the last month, have you wished you were dead or wished you could go to sleep and not wake up? No   Within the last month, have you had any actual thoughts of killing yourself? No   Within the last month, have you ever done anything, started to do anything, or prepared to do anything to end your life? No       Follow Up   Follow Up Actions Taken  Patient declined referral.    Discussed the following ways the patient can remain in a safe environment:      Return in about 4 weeks  (around 4/6/2022) for in person for pap and anxiety.    [facility] is a 31 year old who presents for the following health issues   History of Present Illness       Mental Health Follow-up:  Patient presents to follow-up on Depression & Anxiety.Patient's depression since last visit has been:  Medium  The patient is having other symptoms associated with depression.  Patient's anxiety since last visit has been:  Medium  The patient is having other symptoms associated with anxiety.  Any significant life events: grief or loss  Patient is feeling anxious or having panic attacks.  Patient has no concerns about alcohol or drug use.       Today's PHQ-9         PHQ-9 Total Score: 16  PHQ-9 Q9 Thoughts of better off dead/self-harm past 2 weeks :   (P) Several days  Thoughts of suicide or self harm: (P) No  Self-harm Plan:     Self-harm Action:       Safety concerns for self or others: (P) No    How difficult have these problems made it for you to do your work, take care of things at home, or get along with other people: Somewhat difficult    Today's SUNIL-7 Score: 3    She eats 2-3 servings of fruits and vegetables daily.She consumes 3 sweetened beverage(s) daily.She exercises with enough effort to increase her heart rate 30 to 60 minutes per day.  She exercises with enough effort to increase her heart rate 3 or less days per week. She is missing 1 dose(s) of medications per week.  She is not taking prescribed medications regularly due to remembering to take.   She is starting her menses today.  A lot of cramping last night.  Very thirsty this AM.  Reports she chugged a liter of water then vomited it all up this morning.  She is only having spotting.  Not full period.  She is passing some small clots.  She is wondering about pregnancy today due to the vomiting.  She and spouse are still trying to conceive.  Has not yet made an appointment with  gynecology.  They are taking a big trip this week to [facility] and she would like to wait until after then.    Regarding her mental health, some days are better but overall she feels about the same.  Still having a lot of irritability and anxiety.  Still anxious about fertility.      She is concerned about weight gain as well.  Reports not eating much throughout the day.  Does eat more at night.  She has started exercising.      Social History     Tobacco Use     Smoking status: Never Smoker     Smokeless tobacco: Never Used   Substance Use Topics     Alcohol use: Yes     Comment: 1-2 times per week     Drug use: No     PHQ 1/26/2022 1/31/2022 3/9/2022   PHQ-9 Total Score 11 11 16   Q9: Thoughts of better off dead/self-harm past 2 weeks Not at all Not at all Several days   F/U: Thoughts of suicide or self-harm - - No   F/U: Safety concerns - - No     SUNIL-7 SCORE 1/26/2022 1/31/2022 3/9/2022   Total Score 7 (mild anxiety) 7 (mild anxiety) 3 (minimal anxiety)   Total Score 7 7 3     Last PHQ-9 3/9/2022   1.  Little interest or pleasure in doing things 2   2.  Feeling down, depressed, or hopeless 2   3.  Trouble falling or staying asleep, or sleeping too much 2   4.  Feeling tired or having little energy 2   5.  Poor appetite or overeating 3   6.  Feeling bad about yourself 2   7.  Trouble concentrating 2   8.  Moving slowly or restless 0   Q9: Thoughts of better off dead/self-harm past 2 weeks 1   PHQ-9 Total Score 16   Difficulty at work, home, or with people -   In the past two weeks have you had thoughts of suicide or self harm? No   Do you have concerns about your personal safety or the safety of others? No     SUNIL-7  3/9/2022   1. Feeling nervous, anxious, or on edge 1   2. Not being able to stop or control worrying 0   3. Worrying too much about different things 0   4. Trouble relaxing 0   5. Being so restless that it is hard to sit still 0   6. Becoming easily annoyed or irritable 1   7. Feeling afraid, as if  something awful might happen 1   SUNIL-7 Total Score 3   If you checked any problems, how difficult have they made it for you to do your work, take care of things at home, or get along with other people? -        Review of Systems   Constitutional, HEENT, cardiovascular, pulmonary, GI, , musculoskeletal, neuro, skin, endocrine and psych systems are negative, except as otherwise noted.      Objective    /86 (BP Location: Left arm, Patient Position: Sitting, Cuff Size: Adult Regular)   Pulse 76   Temp 98.2  F (36.8  C) (Tympanic)   Resp 10   Wt 110.6 kg (243 lb 12.8 oz)   LMP 02/03/2022 (Approximate)   SpO2 100%   BMI 39.35 kg/m    Body mass index is 39.35 kg/m .  Physical Exam   GENERAL: healthy, alert and no distress  EYES: Eyes grossly normal to inspection, PERRL and conjunctivae and sclerae normal  HENT: ear canals and TM's normal, nose and mouth without ulcers or lesions  NECK: no adenopathy, no asymmetry, masses, or scars and thyroid normal to palpation  RESP: lungs clear to auscultation - no rales, rhonchi or wheezes  CV: regular rate and rhythm, normal S1 S2, no S3 or S4, no murmur, click or rub, no peripheral edema and peripheral pulses strong  ABDOMEN: soft, nontender, no hepatosplenomegaly, no masses and bowel sounds normal  MS: no gross musculoskeletal defects noted, no edema  SKIN: scarring under breasts bilaterally.  No active cysts noted.     Virtual Visit on 02/01/2022   Component Date Value Ref Range Status     TSH 02/01/2022 1.32  0.40 - 4.00 mU/L Final     Hemoglobin A1C 02/01/2022 5.4  0.0 - 5.6 % Final    Normal <5.7%   Prediabetes 5.7-6.4%    Diabetes 6.5% or higher     Note: Adopted from ADA consensus guidelines.     Sodium 02/01/2022 138  133 - 144 mmol/L Final     Potassium 02/01/2022 4.3  3.4 - 5.3 mmol/L Final     Chloride 02/01/2022 107  94 - 109 mmol/L Final     Carbon Dioxide (CO2) 02/01/2022 25  20 - 32 mmol/L Final     Anion Gap 02/01/2022 6  3 - 14 mmol/L Final     Urea  Nitrogen 02/01/2022 14  7 - 30 mg/dL Final     Creatinine 02/01/2022 0.63  0.52 - 1.04 mg/dL Final     Calcium 02/01/2022 8.7  8.5 - 10.1 mg/dL Final     Glucose 02/01/2022 101 (A) 70 - 99 mg/dL Final     Alkaline Phosphatase 02/01/2022 83  40 - 150 U/L Final     AST 02/01/2022 10  0 - 45 U/L Final     ALT 02/01/2022 24  0 - 50 U/L Final     Protein Total 02/01/2022 8.4  6.8 - 8.8 g/dL Final     Albumin 02/01/2022 3.5  3.4 - 5.0 g/dL Final     Bilirubin Total 02/01/2022 0.2  0.2 - 1.3 mg/dL Final     GFR Estimate 02/01/2022 >90  >60 mL/min/1.73m2 Final    Effective December 21, 2021 eGFRcr in adults is calculated using the 2021 CKD-EPI creatinine equation which includes age and gender (Ziggy et al., NE, DOI: 10.1056/RKUYvt2673669)     Iron 02/01/2022 49  35 - 180 ug/dL Final     Iron Binding Capacity 02/01/2022 385  240 - 430 ug/dL Final     Iron Sat Index 02/01/2022 13 (A) 15 - 46 % Final     Ferritin 02/01/2022 32  12 - 150 ng/mL Final     Cholesterol 02/01/2022 145  <200 mg/dL Final     Triglycerides 02/01/2022 79  <150 mg/dL Final     Direct Measure HDL 02/01/2022 55  >=50 mg/dL Final     LDL Cholesterol Calculated 02/01/2022 74  <=100 mg/dL Final     Non HDL Cholesterol 02/01/2022 90  <130 mg/dL Final     Patient Fasting > 8hrs? 02/01/2022 Yes   Final     Vitamin D, Total (25-Hydroxy) 02/01/2022 17 (A) 20 - 75 ug/L Final     WBC Count 02/01/2022 10.0  4.0 - 11.0 10e3/uL Final     RBC Count 02/01/2022 4.39  3.80 - 5.20 10e6/uL Final     Hemoglobin 02/01/2022 12.4  11.7 - 15.7 g/dL Final     Hematocrit 02/01/2022 38.1  35.0 - 47.0 % Final     MCV 02/01/2022 87  78 - 100 fL Final     MCH 02/01/2022 28.2  26.5 - 33.0 pg Final     MCHC 02/01/2022 32.5  31.5 - 36.5 g/dL Final     RDW 02/01/2022 13.5  10.0 - 15.0 % Final     Platelet Count 02/01/2022 443  150 - 450 10e3/uL Final     % Neutrophils 02/01/2022 61  % Final     % Lymphocytes 02/01/2022 29  % Final     % Monocytes 02/01/2022 7  % Final     % Eosinophils  02/01/2022 2  % Final     % Basophils 02/01/2022 0  % Final     % Immature Granulocytes 02/01/2022 1  % Final     NRBCs per 100 WBC 02/01/2022 0  <1 /100 Final     Absolute Neutrophils 02/01/2022 6.1  1.6 - 8.3 10e3/uL Final     Absolute Lymphocytes 02/01/2022 2.9  0.8 - 5.3 10e3/uL Final     Absolute Monocytes 02/01/2022 0.7  0.0 - 1.3 10e3/uL Final     Absolute Eosinophils 02/01/2022 0.2  0.0 - 0.7 10e3/uL Final     Absolute Basophils 02/01/2022 0.0  0.0 - 0.2 10e3/uL Final     Absolute Immature Granulocytes 02/01/2022 0.1  <=0.4 10e3/uL Final     Absolute NRBCs 02/01/2022 0.0  10e3/uL Final

## 2022-03-10 ENCOUNTER — TELEPHONE (OUTPATIENT)
Dept: FAMILY MEDICINE | Facility: CLINIC | Age: 32
End: 2022-03-10
Payer: COMMERCIAL

## 2022-03-10 DIAGNOSIS — F41.9 ANXIETY: ICD-10-CM

## 2022-03-10 ASSESSMENT — PATIENT HEALTH QUESTIONNAIRE - PHQ9: SUM OF ALL RESPONSES TO PHQ QUESTIONS 1-9: 16

## 2022-03-10 ASSESSMENT — ANXIETY QUESTIONNAIRES: GAD7 TOTAL SCORE: 3

## 2022-03-11 NOTE — TELEPHONE ENCOUNTER
Fax message: Please clarify SIG. This says to use 100mg tablet, but directions appear to be for 50mg tablet starting with 25mg- is this RX supposed to be for 50mg? Please call pharmacy or send a new RX if appropriate. Thanks!    Drug: Sertraline 100mg tab rcvd.

## 2022-03-14 RX ORDER — SERTRALINE HYDROCHLORIDE 100 MG/1
100 TABLET, FILM COATED ORAL DAILY
Qty: 90 TABLET | Refills: 3 | Status: SHIPPED | OUTPATIENT
Start: 2022-03-14 | End: 2023-01-20

## 2022-04-10 ENCOUNTER — HEALTH MAINTENANCE LETTER (OUTPATIENT)
Age: 32
End: 2022-04-10

## 2022-06-13 ENCOUNTER — OFFICE VISIT (OUTPATIENT)
Dept: URGENT CARE | Facility: URGENT CARE | Age: 32
End: 2022-06-13
Payer: COMMERCIAL

## 2022-06-13 VITALS
SYSTOLIC BLOOD PRESSURE: 134 MMHG | WEIGHT: 244.8 LBS | TEMPERATURE: 97.3 F | HEART RATE: 83 BPM | OXYGEN SATURATION: 99 % | BODY MASS INDEX: 40.79 KG/M2 | DIASTOLIC BLOOD PRESSURE: 85 MMHG | HEIGHT: 65 IN

## 2022-06-13 DIAGNOSIS — H60.391 INFECTIVE OTITIS EXTERNA, RIGHT: Primary | ICD-10-CM

## 2022-06-13 DIAGNOSIS — H10.9 BACTERIAL CONJUNCTIVITIS OF LEFT EYE: ICD-10-CM

## 2022-06-13 DIAGNOSIS — H92.01 PAIN IN RIGHT EAR: ICD-10-CM

## 2022-06-13 PROCEDURE — 99213 OFFICE O/P EST LOW 20 MIN: CPT | Mod: 25 | Performed by: NURSE PRACTITIONER

## 2022-06-13 PROCEDURE — 96372 THER/PROPH/DIAG INJ SC/IM: CPT | Performed by: NURSE PRACTITIONER

## 2022-06-13 RX ORDER — KETOROLAC TROMETHAMINE 30 MG/ML
30 INJECTION, SOLUTION INTRAMUSCULAR; INTRAVENOUS ONCE
Status: COMPLETED | OUTPATIENT
Start: 2022-06-13 | End: 2022-06-13

## 2022-06-13 RX ORDER — NEOMYCIN SULFATE, POLYMYXIN B SULFATE, HYDROCORTISONE 3.5; 10000; 1 MG/ML; [USP'U]/ML; MG/ML
3 SOLUTION/ DROPS AURICULAR (OTIC) 4 TIMES DAILY
Qty: 6 ML | Refills: 0 | Status: SHIPPED | OUTPATIENT
Start: 2022-06-13 | End: 2022-06-23

## 2022-06-13 RX ORDER — NEOMYCIN/POLYMYXIN B/HYDROCORT 3.5-10K-1
1-2 SUSPENSION, DROPS(FINAL DOSAGE FORM)(ML) OPHTHALMIC (EYE) 4 TIMES DAILY
Qty: 4 ML | Refills: 0 | Status: SHIPPED | OUTPATIENT
Start: 2022-06-13 | End: 2022-06-23

## 2022-06-13 RX ADMIN — KETOROLAC TROMETHAMINE 30 MG: 30 INJECTION, SOLUTION INTRAMUSCULAR; INTRAVENOUS at 12:36

## 2022-06-13 NOTE — PROGRESS NOTES
"Assessment & Plan       Diagnosis Comments   1. Infective otitis externa, right  neomycin-polymyxin-hydrocortisone (CORTISPORIN) 3.5-58750-0 otic solution, ketorolac (TORADOL) injection 30 mg    2. Bacterial conjunctivitis of left eye  neomycin-polymyxin-hydrocortisone (CORTISPORIN) 3.5-31018-1 ophthalmic suspension    3. Pain in right ear  ketorolac (TORADOL) injection 30 mg      Verbalized understanding; will monitor symptoms closely. Reviewed s/e to medications.   Handout given from [facility] and reviewed   Will return to clinic in 3 days if symptoms not improving.   Tylenol or ibuprofen for pain.     MARCELA Campbell [facility] CARE KWESIBRENDAN Jones is a 31 year old female who presents to clinic today for the following health issues:  Chief Complaint   Patient presents with     Conjunctivitis            Ear Drainage     Right ear.     Otalgia     HPI    Patient with approximately 4-day history of right ear pain that became excruciatingly worse overnight.  She also is now having left eye drainage irritation was crusted shut this morning when she woke up.  States she has been around her nieces and nephews over the weekend that were sick.  She has had a history of some seasonal allergies and sinusitis recently.  Denies nausea vomiting or diarrhea.  Denies cough.  And denies fever.      Review of Systems  Constitutional, HEENT, cardiovascular, pulmonary, gi and gu systems are negative, except as otherwise noted.      Objective    /85 (BP Location: Left arm, Patient Position: Sitting, Cuff Size: Adult Large)   Pulse 83   Temp 97.3  F (36.3  C) (Tympanic)   Ht 1.651 m (5' 5\")   Wt 111 kg (244 lb 12.8 oz)   SpO2 99%   BMI 40.74 kg/m    Physical Exam   GENERAL: alert, over weight and fatigued  EYES: PERRL, EOMI and conjunctiva/corneas- conjunctival injection OS and yellow colored discharge present left  HENT: normal cephalic/atraumatic, right ear: erythematous, " purulent drainage in canal and red and boggy canal, left ear: normal: no effusions, no erythema, normal landmarks, nose and mouth without ulcers or lesions, oropharynx clear and oral mucous membranes moist  NECK: bilateral anterior cervical adenopathy, no asymmetry, masses, or scars and thyroid normal to palpation  RESP: lungs clear to auscultation - no rales, rhonchi or wheezes  CV: regular rate and rhythm, normal S1 S2, no S3 or S4, no murmur, click or rub, no peripheral edema and peripheral pulses strong  ABDOMEN: soft, nontender, no hepatosplenomegaly, no masses and bowel sounds normal  MS: no gross musculoskeletal defects noted, no edema

## 2022-10-16 ENCOUNTER — HEALTH MAINTENANCE LETTER (OUTPATIENT)
Age: 32
End: 2022-10-16

## 2022-10-26 LAB
ABO/RH(D): NORMAL
ANTIBODY SCREEN: NEGATIVE
SPECIMEN EXPIRATION DATE: NORMAL

## 2022-10-27 ENCOUNTER — TELEPHONE (OUTPATIENT)
Dept: OBGYN | Facility: CLINIC | Age: 32
End: 2022-10-27

## 2022-10-27 ENCOUNTER — LAB (OUTPATIENT)
Dept: LAB | Facility: CLINIC | Age: 32
End: 2022-10-27
Payer: COMMERCIAL

## 2022-10-27 DIAGNOSIS — O26.859 SPOTTING IN EARLY PREGNANCY: Primary | ICD-10-CM

## 2022-10-27 DIAGNOSIS — O26.859 SPOTTING IN EARLY PREGNANCY: ICD-10-CM

## 2022-10-27 LAB — B-HCG SERPL-ACNC: 67 IU/L (ref 0–5)

## 2022-10-27 PROCEDURE — 86900 BLOOD TYPING SEROLOGIC ABO: CPT

## 2022-10-27 PROCEDURE — 84702 CHORIONIC GONADOTROPIN TEST: CPT

## 2022-10-27 PROCEDURE — 86850 RBC ANTIBODY SCREEN: CPT

## 2022-10-27 PROCEDURE — 86901 BLOOD TYPING SEROLOGIC RH(D): CPT

## 2022-10-27 PROCEDURE — 36415 COLL VENOUS BLD VENIPUNCTURE: CPT

## 2022-10-27 NOTE — TELEPHONE ENCOUNTER
Pos UPT on 10/25, having slight spotting (pink streaks) when she wipes for the last two days.  Very slight cramping off an on.  Very anxious.  LMP 9/19/22 5w3d    Scheduled for HCG quant/ABO today (ordered under Dr Henry who pt will see).  Will do walk in lab Saturday at [facility] (will order under Dr Krishna who is on call Sat).    Sheryl Dela Cruz RN

## 2022-10-29 ENCOUNTER — LAB (OUTPATIENT)
Dept: LAB | Facility: CLINIC | Age: 32
End: 2022-10-29
Payer: COMMERCIAL

## 2022-10-29 DIAGNOSIS — O26.859 SPOTTING IN EARLY PREGNANCY: ICD-10-CM

## 2022-10-29 LAB — HCG INTACT+B SERPL-ACNC: 140 MIU/ML

## 2022-10-29 PROCEDURE — 36415 COLL VENOUS BLD VENIPUNCTURE: CPT

## 2022-10-29 PROCEDURE — 84702 CHORIONIC GONADOTROPIN TEST: CPT

## 2022-10-31 ENCOUNTER — LAB (OUTPATIENT)
Dept: LAB | Facility: CLINIC | Age: 32
End: 2022-10-31
Payer: COMMERCIAL

## 2022-10-31 DIAGNOSIS — O26.859 SPOTTING IN EARLY PREGNANCY: ICD-10-CM

## 2022-10-31 LAB — B-HCG SERPL-ACNC: 186 IU/L (ref 0–5)

## 2022-10-31 PROCEDURE — 36415 COLL VENOUS BLD VENIPUNCTURE: CPT

## 2022-10-31 PROCEDURE — 84702 CHORIONIC GONADOTROPIN TEST: CPT

## 2022-11-02 ENCOUNTER — LAB (OUTPATIENT)
Dept: LAB | Facility: CLINIC | Age: 32
End: 2022-11-02
Payer: COMMERCIAL

## 2022-11-02 DIAGNOSIS — O26.859 SPOTTING IN EARLY PREGNANCY: ICD-10-CM

## 2022-11-02 LAB — B-HCG SERPL-ACNC: 212 IU/L (ref 0–5)

## 2022-11-02 PROCEDURE — 84702 CHORIONIC GONADOTROPIN TEST: CPT

## 2022-11-02 PROCEDURE — 36415 COLL VENOUS BLD VENIPUNCTURE: CPT

## 2022-11-03 ENCOUNTER — TELEPHONE (OUTPATIENT)
Dept: OBGYN | Facility: CLINIC | Age: 32
End: 2022-11-03

## 2022-11-03 DIAGNOSIS — O26.859 SPOTTING IN PREGNANCY: Primary | ICD-10-CM

## 2022-11-03 NOTE — TELEPHONE ENCOUNTER
I spoke w/Pt:  Her  (11/2) was not a normal rise, again, from 186 on 10/31.  Therefore this almost certainly is a non-viable pregnancy.  There is a small chance for an ectopic as well.  She has had no spotting and no pain at all, and very much wants to be absolutely certain that this is a non-viable pregnancy before intervening.  She has a Quant HCG lab draw scheduled for tomorrow in [facility], but would like that appt changed to [place] lab.    1)  Please assist Pt in getting that lab appt in [place] for tomorrow's HCG.      2)  Pt also should get a pelvic U/S tomorrow as a precaution to confirm no obvious ectopic, although I did advise her that most likely no IUP and no ectopic will be visible with such a low HCG, but given the weekend is coming I want to be sure.    3)  She needs another HCG on Sunday as well.  Please arrange that for Pt.    4)  Pt needs appt ideally on Monday with any OB to review HCGs and findings on the U/S to see if any intervention is needed.

## 2022-11-03 NOTE — TELEPHONE ENCOUNTER
Spoke with pt.(very tearful)  Orders placed.  She will go to [facility] lab tomorrow and [place] for HCG quants.  ultrasound scheduled at [place] 11/4  F/u appt scheduled Mon.  To call for any questions or concerns.  Ann-Marie Beverly RN

## 2022-11-04 ENCOUNTER — ANCILLARY PROCEDURE (OUTPATIENT)
Dept: ULTRASOUND IMAGING | Facility: CLINIC | Age: 32
End: 2022-11-04
Payer: COMMERCIAL

## 2022-11-04 ENCOUNTER — LAB (OUTPATIENT)
Dept: LAB | Facility: CLINIC | Age: 32
End: 2022-11-04
Payer: COMMERCIAL

## 2022-11-04 DIAGNOSIS — O26.859 SPOTTING IN PREGNANCY: ICD-10-CM

## 2022-11-04 LAB — HCG INTACT+B SERPL-ACNC: 272 MIU/ML

## 2022-11-04 PROCEDURE — 76817 TRANSVAGINAL US OBSTETRIC: CPT | Performed by: OBSTETRICS & GYNECOLOGY

## 2022-11-04 PROCEDURE — 84702 CHORIONIC GONADOTROPIN TEST: CPT

## 2022-11-04 PROCEDURE — 36415 COLL VENOUS BLD VENIPUNCTURE: CPT

## 2022-11-04 PROCEDURE — 76801 OB US < 14 WKS SINGLE FETUS: CPT | Performed by: OBSTETRICS & GYNECOLOGY

## 2022-11-06 ENCOUNTER — LAB (OUTPATIENT)
Dept: LAB | Facility: CLINIC | Age: 32
End: 2022-11-06
Payer: COMMERCIAL

## 2022-11-06 DIAGNOSIS — O26.859 SPOTTING IN PREGNANCY: ICD-10-CM

## 2022-11-06 DIAGNOSIS — O00.80 OTHER ECTOPIC PREGNANCY WITHOUT INTRAUTERINE PREGNANCY: ICD-10-CM

## 2022-11-06 LAB — HCG INTACT+B SERPL-ACNC: 329 MIU/ML

## 2022-11-06 PROCEDURE — 84450 TRANSFERASE (AST) (SGOT): CPT

## 2022-11-06 PROCEDURE — 84702 CHORIONIC GONADOTROPIN TEST: CPT

## 2022-11-06 PROCEDURE — 82565 ASSAY OF CREATININE: CPT

## 2022-11-06 PROCEDURE — 36415 COLL VENOUS BLD VENIPUNCTURE: CPT

## 2022-11-07 ENCOUNTER — OFFICE VISIT (OUTPATIENT)
Dept: OBGYN | Facility: CLINIC | Age: 32
End: 2022-11-07
Attending: OBSTETRICS & GYNECOLOGY
Payer: COMMERCIAL

## 2022-11-07 VITALS
SYSTOLIC BLOOD PRESSURE: 130 MMHG | DIASTOLIC BLOOD PRESSURE: 80 MMHG | BODY MASS INDEX: 43.32 KG/M2 | WEIGHT: 260 LBS | HEIGHT: 65 IN

## 2022-11-07 DIAGNOSIS — D50.0 IRON DEFICIENCY ANEMIA DUE TO CHRONIC BLOOD LOSS: ICD-10-CM

## 2022-11-07 DIAGNOSIS — O00.80 OTHER ECTOPIC PREGNANCY WITHOUT INTRAUTERINE PREGNANCY: Primary | ICD-10-CM

## 2022-11-07 LAB
AST SERPL W P-5'-P-CCNC: 20 U/L (ref 10–35)
CREAT SERPL-MCNC: 0.67 MG/DL (ref 0.51–0.95)
ERYTHROCYTE [DISTWIDTH] IN BLOOD BY AUTOMATED COUNT: 14.2 % (ref 10–15)
GFR SERPL CREATININE-BSD FRML MDRD: >90 ML/MIN/1.73M2
HCT VFR BLD AUTO: 36 % (ref 35–47)
HGB BLD-MCNC: 11.9 G/DL (ref 11.7–15.7)
MCH RBC QN AUTO: 27.7 PG (ref 26.5–33)
MCHC RBC AUTO-ENTMCNC: 33.1 G/DL (ref 31.5–36.5)
MCV RBC AUTO: 84 FL (ref 78–100)
PLATELET # BLD AUTO: 456 10E3/UL (ref 150–450)
RBC # BLD AUTO: 4.3 10E6/UL (ref 3.8–5.2)
WBC # BLD AUTO: 11.3 10E3/UL (ref 4–11)

## 2022-11-07 PROCEDURE — 36415 COLL VENOUS BLD VENIPUNCTURE: CPT | Performed by: OBSTETRICS & GYNECOLOGY

## 2022-11-07 PROCEDURE — 85027 COMPLETE CBC AUTOMATED: CPT | Performed by: OBSTETRICS & GYNECOLOGY

## 2022-11-07 PROCEDURE — 99213 OFFICE O/P EST LOW 20 MIN: CPT | Performed by: OBSTETRICS & GYNECOLOGY

## 2022-11-07 RX ORDER — ALBUTEROL SULFATE 0.83 MG/ML
2.5 SOLUTION RESPIRATORY (INHALATION)
Status: CANCELLED | OUTPATIENT
Start: 2022-11-08

## 2022-11-07 RX ORDER — METHOTREXATE 25 MG/ML
50 INJECTION, SOLUTION INTRA-ARTERIAL; INTRAMUSCULAR; INTRATHECAL; INTRAVENOUS ONCE
Status: CANCELLED | OUTPATIENT
Start: 2022-11-08 | End: 2022-11-08

## 2022-11-07 RX ORDER — DIPHENHYDRAMINE HYDROCHLORIDE 50 MG/ML
50 INJECTION INTRAMUSCULAR; INTRAVENOUS
Status: CANCELLED
Start: 2022-11-08

## 2022-11-07 RX ORDER — METHOTREXATE 25 MG/ML
50 INJECTION INTRA-ARTERIAL; INTRAMUSCULAR; INTRATHECAL; INTRAVENOUS ONCE
Status: DISCONTINUED | OUTPATIENT
Start: 2022-11-07 | End: 2022-11-17

## 2022-11-07 RX ORDER — ALBUTEROL SULFATE 90 UG/1
1-2 AEROSOL, METERED RESPIRATORY (INHALATION)
Status: CANCELLED
Start: 2022-11-08

## 2022-11-07 RX ORDER — EPINEPHRINE 1 MG/ML
0.3 INJECTION, SOLUTION, CONCENTRATE INTRAVENOUS EVERY 5 MIN PRN
Status: CANCELLED | OUTPATIENT
Start: 2022-11-08

## 2022-11-07 RX ORDER — METHYLPREDNISOLONE SODIUM SUCCINATE 125 MG/2ML
125 INJECTION, POWDER, LYOPHILIZED, FOR SOLUTION INTRAMUSCULAR; INTRAVENOUS
Status: CANCELLED
Start: 2022-11-08

## 2022-11-07 NOTE — NURSING NOTE
"Chief Complaint   Patient presents with     Follow Up     Follow up to discuss US and lab results.         Initial /80   Ht 1.651 m (5' 5\")   Wt 117.9 kg (260 lb)   LMP 2022 (Exact Date)   BMI 43.27 kg/m   Estimated body mass index is 43.27 kg/m  as calculated from the following:    Height as of this encounter: 1.651 m (5' 5\").    Weight as of this encounter: 117.9 kg (260 lb).  BP completed using cuff size: regular    Questioned patient about current smoking habits.  Pt. has never smoked.          The following HM Due: NONE      The following patient reported/Care Every where data was sent to:  P ABSTRACT QUALITY INITIATIVES [[id number]]  Meg Spring LPN             "

## 2022-11-07 NOTE — PROGRESS NOTES
"  Assessment & Plan     Other ectopic pregnancy without intrauterine pregnancy  - Dx of ectopic pregnancy is based on continued abnormally rising HCGs at a very abnormally slow rate of climb, combined with recent U/S showing an empty uterus w/ very thin endometrial stripe for what should be a thickened stripe if IUP were present.  - Recommended Rx w/ methotrexate to resolve ectopic pregnancy while still small so as to hopefully avoid need for surgery to remove ectopic and possibly lose a tube.  Risk of side effects, and rupture of ectopic despite Rx was reviewed.  - AST; added to prior HCG sample  - Creatinine; added to prior HCG sample  - Rx methotrexate sodium (pres-free) injection 116.5 mg at infusion center tomorrow once labs all back.  - Rh Positive - rho(D) immune globulin workup not needed  - CBC with platelets today for baseline.  - Repeat Quant HCG on day of methotrexate dose (tomorrow), and on Day #4 (Fri 11/11) and Day #7 (Mon 11/14).    Review of the result(s) of each unique test - Serial HCGs and recent pelvic U/S  25 minutes spent on the date of the encounter doing chart review        BMI:   Estimated body mass index is 43.27 kg/m  as calculated from the following:    Height as of this encounter: 1.651 m (5' 5\").    Weight as of this encounter: 117.9 kg (260 lb).           No follow-ups on file.    Contreras Krishna MD  [facility] AKASH Jones is a 32 year old, presenting for the following health issues:  Follow Up (Follow up to discuss US and lab results./)      Pt here for f/u abnormally slowly rising HCGs and U/S.  Pt has had no VB or pelvic pain.  Her HCGs are as follows:    10/27/22  67    10/29/22 140    10/31/22 186    11/02/22  212    11/04/22  272    11/06/22  329    Her U/S (11/4/2022) showed an empty uterus w/ 2 small myomas.  Endometrial stripe 2 mm thick.  Right ovary WNL, Left ovary w/ 1.6 cm complex cyst.    Blood type O Pos from prior T&S.   " "          Review of Systems         Objective    /80   Ht 1.651 m (5' 5\")   Wt 117.9 kg (260 lb)   LMP 09/19/2022 (Exact Date)   BMI 43.27 kg/m    Body mass index is 43.27 kg/m .  Physical Exam                       "

## 2022-11-08 ENCOUNTER — TELEPHONE (OUTPATIENT)
Dept: OBGYN | Facility: CLINIC | Age: 32
End: 2022-11-08

## 2022-11-08 ENCOUNTER — TELEPHONE (OUTPATIENT)
Dept: INFUSION THERAPY | Facility: CLINIC | Age: 32
End: 2022-11-08

## 2022-11-08 ENCOUNTER — LAB (OUTPATIENT)
Dept: LAB | Facility: CLINIC | Age: 32
End: 2022-11-08
Payer: COMMERCIAL

## 2022-11-08 DIAGNOSIS — O00.80 OTHER ECTOPIC PREGNANCY WITHOUT INTRAUTERINE PREGNANCY: ICD-10-CM

## 2022-11-08 DIAGNOSIS — O00.80 OTHER ECTOPIC PREGNANCY WITHOUT INTRAUTERINE PREGNANCY: Primary | ICD-10-CM

## 2022-11-08 LAB — HCG INTACT+B SERPL-ACNC: 112 MIU/ML

## 2022-11-08 PROCEDURE — 84702 CHORIONIC GONADOTROPIN TEST: CPT

## 2022-11-08 PROCEDURE — 36415 COLL VENOUS BLD VENIPUNCTURE: CPT

## 2022-11-08 NOTE — TELEPHONE ENCOUNTER
I spoke w/Pt:  Infusion RN advised me Pt's HCG dropped to 112.  Pt states she did have some VB last night and passed small clots this AM.  No pain.  I advised Pt that she does not need methotrexate shot today after all because if this is an ectopic or regular non-viable inevitable SAB, it appears to be resolving on its own.  She needs another Quant HCG in 2 days (Thu 11/10).  If it continues to drop, will check weekly until zero.  Please assist Pt in getting this done.

## 2022-11-08 NOTE — TELEPHONE ENCOUNTER
Pt calling.   She is scheduled to have methotrexate this afternoon to treat a possible ectopic.    States that she started having some bright red spotting last night and this am.   Mild cramps also noted.   Pt also states that she has some lightheadedness and dizziness, but she did just wake up and has not had anything to eat.    I recommended that she eat something.     Advised that if her symptoms get worse, including increase in abd cramps and/or vag bleeding, sob, back pain, dizziness after eating, She should be evaluated in the ED ASAP.    Pt agrees with this plan.    Sabina DOYLE RN

## 2022-11-08 NOTE — PROGRESS NOTES
Patient presented to the infusion center for methotrexate that was ordered yesterday accompanied by her mother. Patient reports passing clots in the shower today and one episosde of spotting this morning after urination. Patient denies further spotting and no pain at this time. HCG level was 112 at 1:04 today down from 329 yesterday.     Writer contacted Dr. Krishna who spoke to the patient and her mother while writer listened in.  Methotrexate has been cancelled today per Dr. Krishna.     Patient will have HCG levels drawn on Thursday per Dr. Krishna    Patient verbalized understanding to contact her GYN office if she experiences  pain, heavy bleeding, nausea or vomiting, dizziness, weakness, fainting or fever greater than 100.4  Maggie James RN on 11/8/2022 at 3:31 PM

## 2022-11-11 ENCOUNTER — NURSE TRIAGE (OUTPATIENT)
Dept: NURSING | Facility: CLINIC | Age: 32
End: 2022-11-11

## 2022-11-11 NOTE — TELEPHONE ENCOUNTER
Patient is currently going through an ectopic pregnancy.    She is miscarrying on her own.    She states she is unable to do her lab until tomorrow and another one on Monday.    Per brett patient needs to do her labs weekly.    Patient will get her lab drawn tomorrow.    Isabel Maddox RN on 11/11/2022 at 5:52 PM    Reason for Disposition    Health Information question, no triage required and triager able to answer question    Additional Information    Negative: [1] Caller is not with the adult (patient) AND [2] reporting urgent symptoms    Negative: Lab result questions    Negative: Medication questions    Negative: Caller can't be reached by phone    Negative: Caller has already spoken to PCP or another triager    Negative: RN needs further essential information from caller in order to complete triage    Negative: Requesting regular office appointment    Negative: [1] Caller requesting NON-URGENT health information AND [2] PCP's office is the best resource    Protocols used: INFORMATION ONLY CALL - NO TRIAGE-A-

## 2022-11-12 ENCOUNTER — LAB (OUTPATIENT)
Dept: LAB | Facility: CLINIC | Age: 32
End: 2022-11-12
Payer: COMMERCIAL

## 2022-11-12 DIAGNOSIS — O00.80 OTHER ECTOPIC PREGNANCY WITHOUT INTRAUTERINE PREGNANCY: ICD-10-CM

## 2022-11-12 PROCEDURE — 84702 CHORIONIC GONADOTROPIN TEST: CPT

## 2022-11-12 PROCEDURE — 36415 COLL VENOUS BLD VENIPUNCTURE: CPT

## 2022-11-14 ENCOUNTER — TELEPHONE (OUTPATIENT)
Dept: OBGYN | Facility: CLINIC | Age: 32
End: 2022-11-14

## 2022-11-14 ENCOUNTER — LAB (OUTPATIENT)
Dept: LAB | Facility: CLINIC | Age: 32
End: 2022-11-14
Payer: COMMERCIAL

## 2022-11-14 DIAGNOSIS — O00.90 ECTOPIC PREGNANCY: Primary | ICD-10-CM

## 2022-11-14 DIAGNOSIS — O00.80 OTHER ECTOPIC PREGNANCY WITHOUT INTRAUTERINE PREGNANCY: Primary | ICD-10-CM

## 2022-11-14 DIAGNOSIS — O00.80 OTHER ECTOPIC PREGNANCY WITHOUT INTRAUTERINE PREGNANCY: ICD-10-CM

## 2022-11-14 LAB
B-HCG SERPL-ACNC: 125 IU/L
HCG INTACT+B SERPL-ACNC: 139 MIU/ML

## 2022-11-14 PROCEDURE — 84702 CHORIONIC GONADOTROPIN TEST: CPT

## 2022-11-14 PROCEDURE — 36415 COLL VENOUS BLD VENIPUNCTURE: CPT

## 2022-11-14 NOTE — TELEPHONE ENCOUNTER
Spoke with pt.    She will have STAT labs done around 145pm tomorrow, 11/15.   She will also sign the consent form for the methotrexate injection. We will fax the consent form to the infusion center once the pt signs it.    The form is on my desk in my office.    She is scheduled for methotrexate at 245 pm on 11/15/22 at the [facility].    Sabina DOYLE RN

## 2022-11-14 NOTE — TELEPHONE ENCOUNTER
See the result note attached to the latest hcg quant.   Pt will try to come in today to the [facility] lab to have her levels checked again.   Sabina DOYLE RN

## 2022-11-14 NOTE — TELEPHONE ENCOUNTER
"I spoke w/Pt:  I reviewed the discrepancy between the HCGs drawn and run at [facility], and the one that was drawn at the [facility] 11/12/2022 and sent to [facility].  Although the [facility] lab is planning on re-running the 11/12 specimen on a different machine than the first one, I want Pt to get another Quant HCG drawn and run today at the [facility] lab.  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 She may not be able to get to  to get this drawn today.  She could get HCG drawn in [facility] today for her schedule, but if so, there needs to be a way for that lab to specify having the specimen sent to [facility] to get the [facility] lab to run the test on the equipment here.  Please see if that is possible, and contact Pt on getting this test drawn today and run at [facility].  "

## 2022-11-15 ENCOUNTER — LAB (OUTPATIENT)
Dept: INFUSION THERAPY | Facility: CLINIC | Age: 32
End: 2022-11-15
Attending: OBSTETRICS & GYNECOLOGY
Payer: COMMERCIAL

## 2022-11-15 ENCOUNTER — LAB (OUTPATIENT)
Dept: LAB | Facility: CLINIC | Age: 32
End: 2022-11-15
Payer: COMMERCIAL

## 2022-11-15 VITALS
WEIGHT: 260 LBS | HEART RATE: 88 BPM | SYSTOLIC BLOOD PRESSURE: 144 MMHG | OXYGEN SATURATION: 100 % | BODY MASS INDEX: 41.78 KG/M2 | HEIGHT: 66 IN | TEMPERATURE: 97.9 F | DIASTOLIC BLOOD PRESSURE: 91 MMHG | RESPIRATION RATE: 16 BRPM

## 2022-11-15 DIAGNOSIS — O00.80 OTHER ECTOPIC PREGNANCY WITHOUT INTRAUTERINE PREGNANCY: Primary | ICD-10-CM

## 2022-11-15 DIAGNOSIS — D50.0 IRON DEFICIENCY ANEMIA DUE TO CHRONIC BLOOD LOSS: ICD-10-CM

## 2022-11-15 DIAGNOSIS — O00.90 ECTOPIC PREGNANCY: ICD-10-CM

## 2022-11-15 LAB
ALBUMIN SERPL BCG-MCNC: 4 G/DL (ref 3.5–5.2)
ALP SERPL-CCNC: 93 U/L (ref 35–104)
ALT SERPL W P-5'-P-CCNC: 42 U/L (ref 10–35)
ANION GAP SERPL CALCULATED.3IONS-SCNC: 13 MMOL/L (ref 7–15)
AST SERPL W P-5'-P-CCNC: 30 U/L (ref 10–35)
BILIRUB SERPL-MCNC: 0.3 MG/DL
BUN SERPL-MCNC: 6.8 MG/DL (ref 6–20)
CALCIUM SERPL-MCNC: 9.6 MG/DL (ref 8.6–10)
CHLORIDE SERPL-SCNC: 99 MMOL/L (ref 98–107)
CREAT SERPL-MCNC: 0.62 MG/DL (ref 0.51–0.95)
DEPRECATED HCO3 PLAS-SCNC: 24 MMOL/L (ref 22–29)
ERYTHROCYTE [DISTWIDTH] IN BLOOD BY AUTOMATED COUNT: 14.5 % (ref 10–15)
GFR SERPL CREATININE-BSD FRML MDRD: >90 ML/MIN/1.73M2
GLUCOSE SERPL-MCNC: 91 MG/DL (ref 70–99)
HCG INTACT+B SERPL-ACNC: 149 MIU/ML
HCT VFR BLD AUTO: 36.5 % (ref 35–47)
HGB BLD-MCNC: 11.6 G/DL (ref 11.7–15.7)
MCH RBC QN AUTO: 27 PG (ref 26.5–33)
MCHC RBC AUTO-ENTMCNC: 31.8 G/DL (ref 31.5–36.5)
MCV RBC AUTO: 85 FL (ref 78–100)
PLATELET # BLD AUTO: 399 10E3/UL (ref 150–450)
POTASSIUM SERPL-SCNC: 4.1 MMOL/L (ref 3.4–5.3)
PROT SERPL-MCNC: 8 G/DL (ref 6.4–8.3)
RBC # BLD AUTO: 4.3 10E6/UL (ref 3.8–5.2)
SODIUM SERPL-SCNC: 136 MMOL/L (ref 136–145)
WBC # BLD AUTO: 11.3 10E3/UL (ref 4–11)

## 2022-11-15 PROCEDURE — 36415 COLL VENOUS BLD VENIPUNCTURE: CPT

## 2022-11-15 PROCEDURE — 96401 CHEMO ANTI-NEOPL SQ/IM: CPT

## 2022-11-15 PROCEDURE — 84702 CHORIONIC GONADOTROPIN TEST: CPT

## 2022-11-15 PROCEDURE — 250N000011 HC RX IP 250 OP 636: Performed by: OBSTETRICS & GYNECOLOGY

## 2022-11-15 PROCEDURE — 80053 COMPREHEN METABOLIC PANEL: CPT

## 2022-11-15 PROCEDURE — 85014 HEMATOCRIT: CPT

## 2022-11-15 RX ORDER — METHYLPREDNISOLONE SODIUM SUCCINATE 125 MG/2ML
125 INJECTION, POWDER, LYOPHILIZED, FOR SOLUTION INTRAMUSCULAR; INTRAVENOUS
Status: CANCELLED
Start: 2022-11-15

## 2022-11-15 RX ORDER — METHOTREXATE 25 MG/ML
50 INJECTION, SOLUTION INTRA-ARTERIAL; INTRAMUSCULAR; INTRATHECAL; INTRAVENOUS ONCE
Status: COMPLETED | OUTPATIENT
Start: 2022-11-15 | End: 2022-11-15

## 2022-11-15 RX ORDER — DIPHENHYDRAMINE HYDROCHLORIDE 50 MG/ML
50 INJECTION INTRAMUSCULAR; INTRAVENOUS
Status: CANCELLED
Start: 2022-11-15

## 2022-11-15 RX ORDER — ALBUTEROL SULFATE 90 UG/1
1-2 AEROSOL, METERED RESPIRATORY (INHALATION)
Status: CANCELLED
Start: 2022-11-15

## 2022-11-15 RX ORDER — ALBUTEROL SULFATE 0.83 MG/ML
2.5 SOLUTION RESPIRATORY (INHALATION)
Status: CANCELLED | OUTPATIENT
Start: 2022-11-15

## 2022-11-15 RX ORDER — METHOTREXATE 25 MG/ML
50 INJECTION, SOLUTION INTRA-ARTERIAL; INTRAMUSCULAR; INTRATHECAL; INTRAVENOUS ONCE
Status: CANCELLED | OUTPATIENT
Start: 2022-11-15 | End: 2022-11-15

## 2022-11-15 RX ORDER — EPINEPHRINE 1 MG/ML
0.3 INJECTION, SOLUTION INTRAMUSCULAR; SUBCUTANEOUS EVERY 5 MIN PRN
Status: CANCELLED | OUTPATIENT
Start: 2022-11-15

## 2022-11-15 RX ADMIN — METHOTREXATE 117 MG: 25 INJECTION INTRA-ARTERIAL; INTRAMUSCULAR; INTRATHECAL; INTRAVENOUS at 15:26

## 2022-11-15 ASSESSMENT — PAIN SCALES - GENERAL: PAINLEVEL: NO PAIN (0)

## 2022-11-15 NOTE — PROGRESS NOTES
Infusion Nursing Note:  Karen Patterson presents today for Methotrexate.    Patient seen by provider today: No   present during visit today: Not Applicable.    Note: Information re: Methotrexate and ectopic pregnancies given to and reviewed with patient.  Patient denies bleeding or cramping in the past 24H.  Has slight nausea.  Consent scanned into Epic.  Labs reviewed.      Intravenous Access:  No Intravenous access/labs at this visit.    Treatment Conditions:  Results reviewed, labs MET treatment parameters, ok to proceed with treatment.    Post Infusion Assessment:  Patient tolerated injection without incident.     Discharge Plan:   Discharge instructions reviewed with: Patient.  Patient discharged in stable condition accompanied by: .  Departure Mode: Ambulatory.      ORACIO CASTLE RN

## 2022-11-15 NOTE — TELEPHONE ENCOUNTER
Pt stopped in and signed the consent form and I faxed it to the infusion center.     Pt will have hcg quants done on 11/18 and 11/21. She is planning on going to the [facility] walk in lab as it works best with her work schedule.    Sabina DOYLE RN

## 2022-11-17 ENCOUNTER — OFFICE VISIT (OUTPATIENT)
Dept: OBGYN | Facility: CLINIC | Age: 32
End: 2022-11-17
Payer: COMMERCIAL

## 2022-11-17 ENCOUNTER — TELEPHONE (OUTPATIENT)
Dept: OBGYN | Facility: CLINIC | Age: 32
End: 2022-11-17

## 2022-11-17 VITALS
BODY MASS INDEX: 42.27 KG/M2 | WEIGHT: 263 LBS | SYSTOLIC BLOOD PRESSURE: 110 MMHG | HEIGHT: 66 IN | DIASTOLIC BLOOD PRESSURE: 70 MMHG

## 2022-11-17 DIAGNOSIS — D50.0 IRON DEFICIENCY ANEMIA DUE TO CHRONIC BLOOD LOSS: ICD-10-CM

## 2022-11-17 DIAGNOSIS — O00.80 OTHER ECTOPIC PREGNANCY WITHOUT INTRAUTERINE PREGNANCY: ICD-10-CM

## 2022-11-17 DIAGNOSIS — R11.0 NAUSEA: ICD-10-CM

## 2022-11-17 DIAGNOSIS — R30.0 DYSURIA: Primary | ICD-10-CM

## 2022-11-17 DIAGNOSIS — O00.90 ECTOPIC PREGNANCY: Primary | ICD-10-CM

## 2022-11-17 LAB
ALBUMIN UR-MCNC: NEGATIVE MG/DL
APPEARANCE UR: CLEAR
BACTERIA #/AREA URNS HPF: ABNORMAL /HPF
BILIRUB UR QL STRIP: NEGATIVE
COLOR UR AUTO: YELLOW
GLUCOSE UR STRIP-MCNC: NEGATIVE MG/DL
HGB UR QL STRIP: ABNORMAL
KETONES UR STRIP-MCNC: NEGATIVE MG/DL
LEUKOCYTE ESTERASE UR QL STRIP: ABNORMAL
NITRATE UR QL: NEGATIVE
PH UR STRIP: 6.5 [PH] (ref 5–7)
RBC #/AREA URNS AUTO: ABNORMAL /HPF
SP GR UR STRIP: 1.02 (ref 1–1.03)
SQUAMOUS #/AREA URNS AUTO: ABNORMAL /LPF
UROBILINOGEN UR STRIP-ACNC: 0.2 E.U./DL
WBC #/AREA URNS AUTO: ABNORMAL /HPF

## 2022-11-17 PROCEDURE — 99215 OFFICE O/P EST HI 40 MIN: CPT | Performed by: OBSTETRICS & GYNECOLOGY

## 2022-11-17 PROCEDURE — 81001 URINALYSIS AUTO W/SCOPE: CPT | Performed by: OBSTETRICS & GYNECOLOGY

## 2022-11-17 PROCEDURE — 87086 URINE CULTURE/COLONY COUNT: CPT | Performed by: OBSTETRICS & GYNECOLOGY

## 2022-11-17 RX ORDER — EPINEPHRINE 1 MG/ML
0.3 INJECTION, SOLUTION, CONCENTRATE INTRAVENOUS EVERY 5 MIN PRN
Status: CANCELLED | OUTPATIENT
Start: 2022-11-17

## 2022-11-17 RX ORDER — ALBUTEROL SULFATE 0.83 MG/ML
2.5 SOLUTION RESPIRATORY (INHALATION)
Status: CANCELLED | OUTPATIENT
Start: 2022-11-17

## 2022-11-17 RX ORDER — ONDANSETRON 4 MG/1
4 TABLET, ORALLY DISINTEGRATING ORAL EVERY 8 HOURS PRN
Qty: 15 TABLET | Refills: 1 | Status: SHIPPED | OUTPATIENT
Start: 2022-11-17 | End: 2023-01-20

## 2022-11-17 RX ORDER — METHYLPREDNISOLONE SODIUM SUCCINATE 125 MG/2ML
125 INJECTION, POWDER, LYOPHILIZED, FOR SOLUTION INTRAMUSCULAR; INTRAVENOUS
Status: CANCELLED
Start: 2022-11-17

## 2022-11-17 RX ORDER — DIPHENHYDRAMINE HYDROCHLORIDE 50 MG/ML
50 INJECTION INTRAMUSCULAR; INTRAVENOUS
Status: CANCELLED
Start: 2022-11-17

## 2022-11-17 RX ORDER — METHOTREXATE 25 MG/ML
50 INJECTION, SOLUTION INTRA-ARTERIAL; INTRAMUSCULAR; INTRATHECAL; INTRAVENOUS ONCE
Status: CANCELLED | OUTPATIENT
Start: 2022-11-17 | End: 2022-11-17

## 2022-11-17 RX ORDER — ALBUTEROL SULFATE 90 UG/1
1-2 AEROSOL, METERED RESPIRATORY (INHALATION)
Status: CANCELLED
Start: 2022-11-17

## 2022-11-17 NOTE — LETTER
[facility]  [address]  [address]  [facility] [address]  [phone]  Dept: [phone]      11/17/2022    Re: Karen Patterson      TO WHOM IT MAY CONCERN:    Karen Patterson  was seen on 11/7 and 11/17/2022 for management of complications of pregnancy. Please excuse missed work starting 11/7. She has required chemotherapeutic agents to resolve a failed pregnancy, this causes many side effects that prevent her from working. She unfortunately will require further treatment to resolve her pregnancy safe.y. Due to invasive treatment she will need to miss another 2 weeks of work and will be able to return 12/1/22 unless there are further complications.     Cordially          Stephanie ePrez MD  [facility]

## 2022-11-17 NOTE — TELEPHONE ENCOUNTER
"Pt seen by Dr Krishna 11/7: \"Dx of ectopic pregnancy is based on continued abnormally rising HCGs at a very abnormally slow rate of climb, combined with recent U/S showing an empty uterus w/ very thin endometrial stripe for what should be a thickened stripe if IUP were present.  - Recommended Rx w/ methotrexate to resolve ectopic pregnancy\"       11/8-pt scheduled for methotrexate-did not receive it due to HCG levels falling adequately ()    11/12- (was re-run per Dr Krishna as it was done at a different [facility] lab than previously and he was concerned about a discrepency)    11/14- (one that was ordered to be re-run)    11/15--Day 1-METHOTREXATE given    11/17-pt seen in clinic as she is not feeling well due to SE of methotrexate, needs day 4 quant tomorrow (11/18)-pt plans to do walk in lab  Will do walk in lab again on 11/22 in the AM for her day 7 HCG level.  Per Dr Perez's request pt was scheduled for methotrexate injection at the infusion center on11/22 at 3:30 tentatively in case she does need a repeat dose.  A consent was signed by the provider and faxed to the infusion center for pt to sign on 11/22.    The pt was called and the above plan of care was reviewed and discussed.  She verbalizes understanding and has no further questions.  Encouraged to call with concerns.    Sheryl Dela Cruz RN            "

## 2022-11-17 NOTE — PROGRESS NOTES
"SUBJECTIVE:   CC: Ectopic pregnancy                                               Karen Patterson is a 32 year old  female who presents to clinic today for follow up of ectopic pregnancy. She was initially diagnosed on  and planned methotrexate on , however due to declining HGC this was not administered at that time. Unfortunately she did experience rise again and received methotrexate on 11/15. See below.  She is feeling awful, lots of nausea and vomiting, fatigue, sleeplessness. Poor PO intake as everything causes nausea. Has missed several days and half days of work for visits, lab draws, and nausea/vomiting/fatigue.     History of present illness:    : Dx of ectopic pregnancy is based on continued abnormally rising HCGs   - Recommended Rx w/ methotrexate to resolve ectopic pregnancy\"      -pt scheduled for methotrexate-did not receive it due to HCG levels falling adequately ()     - (was re-run per Dr Krishna as it was done at a different [facility] lab than previously and he was concerned about a discrepency)     - (one that was ordered to be re-run)     11/15--Day 1-METHOTREXATE given     - pt seen in clinic as she is not feeling well due to SE of methotrexate    Pendin/18 - Day 4 quant: pt plans to do walk in lab   - day 7 HCG level, plans walk in lab   - repeat methotrexate injection scheduled at the infusion center at 3:30 in the event that a 15% decline in HCG is not noted between day 4 an day7, repeat dose would be indicated.        clindamycin (CLEOCIN T) 1 % external solution, Apply topically 2 times daily (Patient not taking: Reported on 11/15/2022)  Docosahexaenoic Acid (PRENATAL DHA PO), Take by mouth daily (Patient not taking: Reported on 11/15/2022)  hydrOXYzine (ATARAX) 10 MG tablet, Take 1-2 tablets (10-20 mg) by mouth every 4 hours as needed for itching (Patient not taking: Reported on 2022)  LORazepam (ATIVAN) 0.5 MG tablet, " "Take 1-2 tablets (0.5-1 mg) by mouth every 6 hours as needed for anxiety For flight (Patient not taking: Reported on 2022)  sertraline (ZOLOFT) 100 MG tablet, Take 1 tablet (100 mg) by mouth daily (Patient not taking: Reported on 2022)    No current facility-administered medications on file prior to visit.    Allergies   Allergen Reactions     No Known Drug Allergies        OBJECTIVE:   /70 (BP Location: Left arm, Patient Position: Chair, Cuff Size: Adult Large)   Ht 1.676 m (5' 6\")   Wt 119.3 kg (263 lb)   LMP 2022 (Exact Date)   Breastfeeding No   BMI 42.45 kg/m     GENERAL: Healthy, alert and no distress  EYES: Eyes grossly normal to inspection.  No discharge or erythema, or obvious scleral/conjunctival abnormalities.  HENT: Normal cephalic/atraumatic.  External ears, nose and mouth without ulcers or lesions.  No nasal drainage visible.  NECK: No asymmetry, visible masses or scars  RESP: No audible wheeze, cough, or visible cyanosis.  No visible retractions or increased work of breathing.   MS: No gross musculoskeletal defects noted.  Normal range of motion.  No visible edema.  SKIN: Visible skin clear. No significant rash, abnormal pigmentation or lesions.  NEURO: Mentation and speech appropriate for age.  PSYCH: Mentation appears normal, affect normal/bright, judgement and insight intact, normal speech and appearance well-groomed.     ASSESSMENT/PLAN:                                                    Karen Patterson is a 32 year old female  here for follow up of ectopic pregnancy, she is feeling terrible after methotrexate.     1. Dysuria    - UA with Microscopic  - Urine Culture  - Urine Microscopic Exam    2. Nausea  - ondansetron (ZOFRAN ODT) 4 MG ODT tab; Take 1 tablet (4 mg) by mouth every 8 hours as needed for nausea  Dispense: 15 tablet; Refill: 1    3. Other ectopic pregnancy without intrauterine pregnancy   - Day 4 quant: pt plans to do walk in lab   - day 7 " HCG level, plans walk in lab   - repeat methotrexate injection scheduled at the infusion center at 3:30 in the event that a 15% decline in HCG is not noted between day 4 an day7, repeat dose would be indicated.    - reviewed reportable symptoms as well as warning signs to repeat immediately to the ER, including severe pain or heavy vaginal bleeding.     Recommend follow up for any worsening symptoms a/w methotrexate or ectopic.    >50  minutes spent on the date of the encounter doing chart review, review of test results, interpretation of tests, patient visit and documentation      Stephanie Perez MD  Obstetrics and Gynecology   [facility] [place]

## 2022-11-17 NOTE — NURSING NOTE
"Chief Complaint   Patient presents with     Follow Up     11/15/22 Methotrexate injection, had sneezing and severe hot flashes. Then developed diarrhea  and severe burning when urinating       Initial /70 (BP Location: Left arm, Patient Position: Chair, Cuff Size: Adult Large)   Ht 1.676 m (5' 6\")   Wt 119.3 kg (263 lb)   LMP 2022 (Exact Date)   Breastfeeding No   BMI 42.45 kg/m   Estimated body mass index is 42.45 kg/m  as calculated from the following:    Height as of this encounter: 1.676 m (5' 6\").    Weight as of this encounter: 119.3 kg (263 lb).  BP completed using cuff size: large    Questioned patient about current smoking habits.  Pt. has never smoked.          The following HM Due: NONE    Breonna Ramirez CMA      "

## 2022-11-17 NOTE — TELEPHONE ENCOUNTER
Received call from infusion center.  Needs a completed consent form filled out and faxed.  Will have pt come to clinic after blood draw to sign consent and then will fax back.  Ann-Marie Beverly RN    Pt will come tomorrow AM after labs and I will witness her signature then fax to infusion center.    Sheryl Dela Cruz RN

## 2022-11-18 ENCOUNTER — LAB (OUTPATIENT)
Dept: LAB | Facility: CLINIC | Age: 32
End: 2022-11-18
Payer: COMMERCIAL

## 2022-11-18 DIAGNOSIS — O00.90 ECTOPIC PREGNANCY: ICD-10-CM

## 2022-11-18 LAB
ALBUMIN SERPL BCG-MCNC: 4.1 G/DL (ref 3.5–5.2)
ALP SERPL-CCNC: 96 U/L (ref 35–104)
ALT SERPL W P-5'-P-CCNC: 69 U/L (ref 10–35)
ANION GAP SERPL CALCULATED.3IONS-SCNC: 11 MMOL/L (ref 7–15)
AST SERPL W P-5'-P-CCNC: 37 U/L (ref 10–35)
BILIRUB SERPL-MCNC: 0.3 MG/DL
BUN SERPL-MCNC: 8.2 MG/DL (ref 6–20)
CALCIUM SERPL-MCNC: 9.5 MG/DL (ref 8.6–10)
CHLORIDE SERPL-SCNC: 101 MMOL/L (ref 98–107)
CREAT SERPL-MCNC: 0.68 MG/DL (ref 0.51–0.95)
DEPRECATED HCO3 PLAS-SCNC: 26 MMOL/L (ref 22–29)
ERYTHROCYTE [DISTWIDTH] IN BLOOD BY AUTOMATED COUNT: 14.6 % (ref 10–15)
GFR SERPL CREATININE-BSD FRML MDRD: >90 ML/MIN/1.73M2
GLUCOSE SERPL-MCNC: 90 MG/DL (ref 70–99)
HCG INTACT+B SERPL-ACNC: 70 MIU/ML
HCT VFR BLD AUTO: 35.6 % (ref 35–47)
HGB BLD-MCNC: 11.2 G/DL (ref 11.7–15.7)
MCH RBC QN AUTO: 27 PG (ref 26.5–33)
MCHC RBC AUTO-ENTMCNC: 31.5 G/DL (ref 31.5–36.5)
MCV RBC AUTO: 86 FL (ref 78–100)
PLATELET # BLD AUTO: 395 10E3/UL (ref 150–450)
POTASSIUM SERPL-SCNC: 4.1 MMOL/L (ref 3.4–5.3)
PROT SERPL-MCNC: 8.1 G/DL (ref 6.4–8.3)
RBC # BLD AUTO: 4.15 10E6/UL (ref 3.8–5.2)
SODIUM SERPL-SCNC: 138 MMOL/L (ref 136–145)
WBC # BLD AUTO: 11.2 10E3/UL (ref 4–11)

## 2022-11-18 PROCEDURE — 84702 CHORIONIC GONADOTROPIN TEST: CPT

## 2022-11-18 PROCEDURE — 85027 COMPLETE CBC AUTOMATED: CPT

## 2022-11-18 PROCEDURE — 80053 COMPREHEN METABOLIC PANEL: CPT

## 2022-11-18 PROCEDURE — 36415 COLL VENOUS BLD VENIPUNCTURE: CPT

## 2022-11-19 LAB — BACTERIA UR CULT: NORMAL

## 2022-11-21 ENCOUNTER — TELEPHONE (OUTPATIENT)
Dept: OBGYN | Facility: CLINIC | Age: 32
End: 2022-11-21

## 2022-11-21 ENCOUNTER — LAB (OUTPATIENT)
Dept: LAB | Facility: CLINIC | Age: 32
End: 2022-11-21
Payer: COMMERCIAL

## 2022-11-21 DIAGNOSIS — O00.90 ECTOPIC PREGNANCY: Primary | ICD-10-CM

## 2022-11-21 DIAGNOSIS — O00.90 ECTOPIC PREGNANCY: ICD-10-CM

## 2022-11-21 LAB
ALBUMIN SERPL-MCNC: 3.5 G/DL (ref 3.4–5)
ALP SERPL-CCNC: 92 U/L (ref 40–150)
ALT SERPL W P-5'-P-CCNC: 48 U/L (ref 0–50)
ANION GAP SERPL CALCULATED.3IONS-SCNC: 4 MMOL/L (ref 3–14)
AST SERPL W P-5'-P-CCNC: 16 U/L (ref 0–45)
B-HCG SERPL-ACNC: 17 IU/L (ref 0–5)
BILIRUB SERPL-MCNC: 0.2 MG/DL (ref 0.2–1.3)
BUN SERPL-MCNC: 11 MG/DL (ref 7–30)
CALCIUM SERPL-MCNC: 9 MG/DL (ref 8.5–10.1)
CHLORIDE BLD-SCNC: 109 MMOL/L (ref 94–109)
CO2 SERPL-SCNC: 28 MMOL/L (ref 20–32)
CREAT SERPL-MCNC: 0.69 MG/DL (ref 0.52–1.04)
GFR SERPL CREATININE-BSD FRML MDRD: >90 ML/MIN/1.73M2
GLUCOSE BLD-MCNC: 88 MG/DL (ref 70–99)
POTASSIUM BLD-SCNC: 4.4 MMOL/L (ref 3.4–5.3)
PROT SERPL-MCNC: 8.2 G/DL (ref 6.8–8.8)
SODIUM SERPL-SCNC: 141 MMOL/L (ref 133–144)

## 2022-11-21 PROCEDURE — 36415 COLL VENOUS BLD VENIPUNCTURE: CPT

## 2022-11-21 PROCEDURE — 80053 COMPREHEN METABOLIC PANEL: CPT

## 2022-11-21 PROCEDURE — 84702 CHORIONIC GONADOTROPIN TEST: CPT

## 2022-11-21 NOTE — TELEPHONE ENCOUNTER
Pt is planning on having her levels check today at the [facility] as it is closer to her and she is not able to drive to [place] today.     I did add in a CMP.    Sabina DOYLE RN

## 2022-11-21 NOTE — TELEPHONE ENCOUNTER
----- Message from Contreras Krishna MD sent at 11/21/2022 12:19 PM CST -----  Regarding: Needs HCG today and also CMP  Pt was given methotrexate on 11/15 (Day #1 of protocol), and did get her HCG drawn on 11/18 (Day #4 of protocol).  Confirm that Pt is coming to get her HCG drawn again today (Day #7) and please add a CMP to it because she had a slight elevation of her ALT and AST on 11/18, and I want to see how it is trending.    Dr. Krishna

## 2022-11-23 NOTE — RESULT ENCOUNTER NOTE
Please call patient with appropriate decline in hgc, meaning the methotrexate is having its intended effect. I would continue pelvic rest until negative. Repeat hcg quant in 1 week.     Stephanie Perez MD

## 2022-11-28 ENCOUNTER — LAB (OUTPATIENT)
Dept: LAB | Facility: CLINIC | Age: 32
End: 2022-11-28
Payer: COMMERCIAL

## 2022-11-28 DIAGNOSIS — O00.90 ECTOPIC PREGNANCY: ICD-10-CM

## 2022-11-28 LAB — B-HCG SERPL-ACNC: <1 IU/L (ref 0–5)

## 2022-11-28 PROCEDURE — 36415 COLL VENOUS BLD VENIPUNCTURE: CPT

## 2022-11-28 PROCEDURE — 84702 CHORIONIC GONADOTROPIN TEST: CPT

## 2023-01-20 ENCOUNTER — OFFICE VISIT (OUTPATIENT)
Dept: INTERNAL MEDICINE | Facility: CLINIC | Age: 33
End: 2023-01-20
Payer: COMMERCIAL

## 2023-01-20 VITALS
SYSTOLIC BLOOD PRESSURE: 136 MMHG | RESPIRATION RATE: 16 BRPM | HEART RATE: 101 BPM | BODY MASS INDEX: 43.32 KG/M2 | HEIGHT: 65 IN | OXYGEN SATURATION: 98 % | WEIGHT: 260 LBS | TEMPERATURE: 97.9 F | DIASTOLIC BLOOD PRESSURE: 92 MMHG

## 2023-01-20 DIAGNOSIS — Z00.00 ENCOUNTER FOR PREVENTIVE HEALTH EXAMINATION: Primary | ICD-10-CM

## 2023-01-20 DIAGNOSIS — N39.3 FEMALE STRESS INCONTINENCE: ICD-10-CM

## 2023-01-20 DIAGNOSIS — O00.80 OTHER ECTOPIC PREGNANCY WITHOUT INTRAUTERINE PREGNANCY: ICD-10-CM

## 2023-01-20 DIAGNOSIS — Z80.3 FAMILY HISTORY OF MALIGNANT NEOPLASM OF BREAST: ICD-10-CM

## 2023-01-20 DIAGNOSIS — Z23 NEED FOR COVID-19 VACCINE: ICD-10-CM

## 2023-01-20 DIAGNOSIS — Z13.1 SCREENING FOR DIABETES MELLITUS: ICD-10-CM

## 2023-01-20 DIAGNOSIS — Z13.29 SCREENING FOR THYROID DISORDER: ICD-10-CM

## 2023-01-20 DIAGNOSIS — Z13.0 SCREENING FOR IRON DEFICIENCY ANEMIA: ICD-10-CM

## 2023-01-20 DIAGNOSIS — Z13.220 SCREENING FOR HYPERLIPIDEMIA: ICD-10-CM

## 2023-01-20 DIAGNOSIS — Z11.59 NEED FOR HEPATITIS C SCREENING TEST: ICD-10-CM

## 2023-01-20 DIAGNOSIS — Z11.4 SCREENING FOR HIV (HUMAN IMMUNODEFICIENCY VIRUS): ICD-10-CM

## 2023-01-20 DIAGNOSIS — Z23 FLU VACCINE NEED: ICD-10-CM

## 2023-01-20 DIAGNOSIS — F41.9 ANXIETY: ICD-10-CM

## 2023-01-20 PROCEDURE — 99395 PREV VISIT EST AGE 18-39: CPT | Mod: 25

## 2023-01-20 PROCEDURE — 99214 OFFICE O/P EST MOD 30 MIN: CPT | Mod: 25

## 2023-01-20 PROCEDURE — 0124A COVID-19 VACCINE BIVALENT BOOSTER 12+ (PFIZER): CPT

## 2023-01-20 PROCEDURE — 90471 IMMUNIZATION ADMIN: CPT

## 2023-01-20 PROCEDURE — 90686 IIV4 VACC NO PRSV 0.5 ML IM: CPT

## 2023-01-20 PROCEDURE — 91312 COVID-19 VACCINE BIVALENT BOOSTER 12+ (PFIZER): CPT

## 2023-01-20 ASSESSMENT — ENCOUNTER SYMPTOMS
DIZZINESS: 0
COUGH: 0
CONSTIPATION: 0
FREQUENCY: 1
HEMATOCHEZIA: 0
MYALGIAS: 0
ABDOMINAL PAIN: 0
SHORTNESS OF BREATH: 0
NAUSEA: 0
CHILLS: 0
NERVOUS/ANXIOUS: 1
PALPITATIONS: 0
EYE PAIN: 0
DYSURIA: 0
WEAKNESS: 0
DIARRHEA: 0
HEMATURIA: 0
ARTHRALGIAS: 0
BREAST MASS: 0
HEARTBURN: 0
HEADACHES: 1
SORE THROAT: 0
PARESTHESIAS: 0

## 2023-01-20 ASSESSMENT — PATIENT HEALTH QUESTIONNAIRE - PHQ9
10. IF YOU CHECKED OFF ANY PROBLEMS, HOW DIFFICULT HAVE THESE PROBLEMS MADE IT FOR YOU TO DO YOUR WORK, TAKE CARE OF THINGS AT HOME, OR GET ALONG WITH OTHER PEOPLE: SOMEWHAT DIFFICULT
SUM OF ALL RESPONSES TO PHQ QUESTIONS 1-9: 8
SUM OF ALL RESPONSES TO PHQ QUESTIONS 1-9: 8

## 2023-01-20 NOTE — PATIENT INSTRUCTIONS
Check your blood pressure when you are able. Our goal is to keep you <140/90.  Today your readings were 136/92 and 140/100.     See me back in 6-8 weeks for your pap smear/breast exam and we will check how you are doing with the Sertraline dose. We will likely increase it back to your usual dose of 100mg.    Please let me know if you have any questions.    Thanks!  MARCELA Bond, CNP   [facility]

## 2023-01-20 NOTE — PROGRESS NOTES
SUBJECTIVE:   CC: Karen is an 32 year old who presents for preventive health visit.     Patient has been advised of split billing requirements and indicates understanding: Yes  Healthy Habits:     Getting at least 3 servings of Calcium per day:  Yes    Bi-annual eye exam:  NO    Dental care twice a year:  Yes    Sleep apnea or symptoms of sleep apnea:  Daytime drowsiness    Diet:  Regular (no restrictions)    Frequency of exercise:  1 day/week    Duration of exercise:  15-30 minutes    Taking medications regularly:  Yes    Medication side effects:  Not applicable    PHQ-2 Total Score: 3    Additional concerns today:  No      Today's PHQ-2 Score:   PHQ-2 ( 1999 [employer]) 1/20/2023   Q1: Little interest or pleasure in doing things 1   Q2: Feeling down, depressed or hopeless 2   PHQ-2 Score 3   PHQ-2 Total Score (12-17 Years)- Positive if 3 or more points; Administer PHQ-A if positive -   Q1: Little interest or pleasure in doing things More than half the days   Q2: Feeling down, depressed or hopeless More than half the days   PHQ-2 Score 4           Social History     Tobacco Use     Smoking status: Never     Smokeless tobacco: Never   Substance Use Topics     Alcohol use: Yes     Comment: 1-2 times per week     If you drink alcohol do you typically have >3 drinks per day or >7 drinks per week? Yes      Alcohol Use 1/20/2023   Prescreen: >3 drinks/day or >7 drinks/week? No   Prescreen: >3 drinks/day or >7 drinks/week? -       Reviewed orders with patient.  Reviewed health maintenance and updated orders accordingly - Yes  Lab work is in process    Breast Cancer Screening:    FHS-7:   Breast CA Risk Assessment (FHS-7) 1/26/2022 1/26/2022 1/31/2022   Did any of your first-degree relatives have breast or ovarian cancer? No No No   Did any of your relatives have bilateral breast cancer? Yes Yes Yes   Did any man in your family have breast cancer? No No No   Did any woman in your family have breast and ovarian cancer? Yes  "Yes Yes   Did any woman in your family have breast cancer before age 50 y? Yes Yes Yes   Do you have 2 or more relatives with breast and/or ovarian cancer? Yes Yes Yes   Do you have 2 or more relatives with breast and/or bowel cancer? Yes Yes Yes       Significant family history of breast cancer in family.     Pertinent mammograms are reviewed under the imaging tab.    History of abnormal Pap smear: NO - age 30-65 PAP every 5 years with negative HPV co-testing recommended  PAP / HPV 3/12/2018 7/23/2014   PAP (Historical) NIL NIL     Reviewed and updated as needed this visit by clinical staff   Tobacco  Allergies    Med Hx  Surg Hx  Fam Hx  Soc Hx        Reviewed and updated as needed this visit by Provider                  Review of Systems   Constitutional: Negative for chills.   HENT: Negative for congestion, ear pain and sore throat.    Eyes: Negative for pain and visual disturbance.   Respiratory: Negative for cough and shortness of breath.    Cardiovascular: Negative for chest pain, palpitations and peripheral edema.   Gastrointestinal: Negative for abdominal pain, constipation, diarrhea, heartburn, hematochezia and nausea.   Breasts:  Negative for tenderness, breast mass and discharge.   Genitourinary: Positive for frequency. Negative for dysuria, genital sores, hematuria, pelvic pain, urgency, vaginal bleeding and vaginal discharge.   Musculoskeletal: Negative for arthralgias and myalgias.   Skin: Negative for rash.   Neurological: Positive for headaches. Negative for dizziness, weakness and paresthesias.   Psychiatric/Behavioral: Negative for mood changes. The patient is nervous/anxious.      OBJECTIVE:   BP (!) 140/100   Pulse 101   Temp 97.9  F (36.6  C) (Oral)   Resp 16   Ht 1.657 m (5' 5.25\")   Wt 117.9 kg (260 lb)   LMP 01/15/2023   SpO2 98%   BMI 42.94 kg/m        Physical Exam  Constitutional:       General: She is not in acute distress.     Appearance: Normal appearance. She is not " ill-appearing, toxic-appearing or diaphoretic.   HENT:      Head: Normocephalic and atraumatic.   Eyes:      Conjunctiva/sclera: Conjunctivae normal.   Cardiovascular:      Rate and Rhythm: Normal rate and regular rhythm.      Heart sounds: Normal heart sounds.   Pulmonary:      Effort: Pulmonary effort is normal.      Breath sounds: Normal breath sounds.   Skin:     General: Skin is warm and dry.   Neurological:      Mental Status: She is alert and oriented to person, place, and time.   Psychiatric:         Mood and Affect: Mood normal.         Behavior: Behavior normal.         Thought Content: Thought content normal.         Judgment: Judgment normal.         Diagnostic Test Results:  Labs reviewed in Epic    ASSESSMENT/PLAN:   (Z00.00) Encounter for preventive health examination  (primary encounter diagnosis)  Comment: Pt presents for annual physical.    (Z11.4) Screening for HIV (human immunodeficiency virus)  Plan: HIV Antigen Antibody Combo            (Z11.59) Need for hepatitis C screening test  Plan: Hepatitis C Screen Reflex to HCV RNA Quant and         Genotype            (Z13.1) Screening for diabetes mellitus  Plan: Basic metabolic panel  (Ca, Cl, CO2, Creat,         Gluc, K, Na, BUN), Hemoglobin A1c            (O00.80) Other ectopic pregnancy without intrauterine pregnancy  Comment: Pt had recent ectopic pregnancy in November of 2022 and was treated with Methotrexate.       (Z13.220) Screening for hyperlipidemia  Plan: Lipid panel reflex to direct LDL Fasting            (Z13.0) Screening for iron deficiency anemia  Plan: CBC with platelets            (Z80.3) Family history of malignant neoplasm of breast  Plan: Cancer Risk Mgmt/Cancer Genetic Counseling         Referral            (Z23) Flu vaccine need  Plan: INFLUENZA VACCINE >6 MONTHS (AFLURIA/FLUZONE)            (Z23) Need for COVID-19 vaccine  Plan: COVID-19 VACCINE BIVALENT BOOSTER 12+ (PFIZER)            (F41.9) Anxiety  Comment: Was on Zoloft  "100MG in the past but discontinued this herself as soon as she learned she was pregnant about 3 months ago. Would like to restart today. Will start at 50MG and will likely increase to 100MG in 6-8 weeks.  Plan: sertraline (ZOLOFT) 50 MG tablet            (N39.3) Female stress incontinence  Comment: Pt c/o severe stress incontinence. She is occasionally unaware of incontinence. No pregnancies outside of recent ectopic pregnancy. I will send her to urology as it is a bit uncommon for her to have severe stress incontinence at her age without having had recent pregnancies.   Plan: Adult Urology  Referral           (Z13.29) Screening for thyroid disorder  Plan: TSH with free T4 reflex            Patient has been advised of split billing requirements and indicates understanding: Yes      COUNSELING:  Reviewed preventive health counseling, as reflected in patient instructions       Regular exercise       Healthy diet/nutrition      BMI:   Estimated body mass index is 42.94 kg/m  as calculated from the following:    Height as of this encounter: 1.657 m (5' 5.25\").    Weight as of this encounter: 117.9 kg (260 lb).   Weight management plan: Discussed healthy diet and exercise guidelines      She reports that she has never smoked. She has never used smokeless tobacco.          MARCELA Bond [facility]  Answers for HPI/ROS submitted by the patient on 1/20/2023  If you checked off any problems, how difficult have these problems made it for you to do your work, take care of things at home, or get along with other people?: Somewhat difficult  PHQ9 TOTAL SCORE: 8      "

## 2024-03-23 ENCOUNTER — HEALTH MAINTENANCE LETTER (OUTPATIENT)
Age: 34
End: 2024-03-23

## 2024-09-10 PROCEDURE — 88305 TISSUE EXAM BY PATHOLOGIST: CPT | Mod: TC,ORL | Performed by: OBSTETRICS & GYNECOLOGY

## 2024-09-10 PROCEDURE — 88305 TISSUE EXAM BY PATHOLOGIST: CPT | Mod: 26 | Performed by: PATHOLOGY

## 2024-09-11 ENCOUNTER — LAB REQUISITION (OUTPATIENT)
Dept: LAB | Facility: CLINIC | Age: 34
End: 2024-09-11
Payer: COMMERCIAL

## 2024-09-12 LAB
PATH REPORT.COMMENTS IMP SPEC: NORMAL
PATH REPORT.COMMENTS IMP SPEC: NORMAL
PATH REPORT.FINAL DX SPEC: NORMAL
PATH REPORT.GROSS SPEC: NORMAL
PATH REPORT.MICROSCOPIC SPEC OTHER STN: NORMAL
PATH REPORT.RELEVANT HX SPEC: NORMAL
PHOTO IMAGE: NORMAL

## 2025-01-31 PROBLEM — N39.3 FEMALE STRESS INCONTINENCE: Status: ACTIVE | Noted: 2025-01-31

## 2025-04-12 ENCOUNTER — HEALTH MAINTENANCE LETTER (OUTPATIENT)
Age: 35
End: 2025-04-12

## (undated) DEVICE — GLOVE PROTEXIS W/NEU-THERA 5.5  2D73TE55

## (undated) DEVICE — ESU PENCIL W/HOLSTER E2350H

## (undated) DEVICE — TUBING SUCTION 12"X1/4" N612

## (undated) DEVICE — SUCTION TIP YANKAUER W/O VENT K86

## (undated) DEVICE — GLOVE PROTEXIS POWDER FREE 8.0 ORTHOPEDIC 2D73ET80

## (undated) DEVICE — BAG CLEAR TRASH 1.3M 39X33" P4040C

## (undated) DEVICE — SPECIMEN BAG BEMIS HI FLOW SUCTION WHITE SOCK 533810

## (undated) DEVICE — LINEN FULL SHEET 5511

## (undated) DEVICE — PACK HAND SOP32HARMO

## (undated) DEVICE — DRSG KERLIX 4 1/2"X4YDS ROLL 6730

## (undated) DEVICE — LINEN HALF SHEET 5512

## (undated) DEVICE — CAST PADDING 4" UNSTERILE 9044

## (undated) DEVICE — SEAL SET MYOSURE ROD LENS SCOPE SINGLE USE 40-902

## (undated) DEVICE — PAD CHUX UNDERPAD 30X36" P3036C

## (undated) DEVICE — GLOVE PROTEXIS BLUE W/NEU-THERA 8.0  2D73EB80

## (undated) DEVICE — SOL NACL 0.9% IRRIG 1000ML BOTTLE 07138-09

## (undated) DEVICE — ESU GROUND PAD ADULT W/CORD E7507

## (undated) DEVICE — NDL COUNTER 20CT 31142493

## (undated) DEVICE — DRAPE STERI TOWEL LG 1010

## (undated) DEVICE — GLOVE PROTEXIS BLUE W/NEU-THERA 6.0  2D73EB60

## (undated) DEVICE — PAD PERI INDIV WRAP 11" 2022A

## (undated) DEVICE — TUBE CULTURE ANAEROBIC PORT-A-CUL 11ML

## (undated) DEVICE — TUBING SYS AQUILEX BLUE INFLOW AQL-110 YLW OUTFLOW AQL-111

## (undated) DEVICE — DRAPE UNDER BUTTOCK 89415

## (undated) DEVICE — TOURNIQUET CUFF 18" REPRO RED 60-7070-103

## (undated) DEVICE — PREP CHLORAPREP 26ML TINTED ORANGE  260815

## (undated) DEVICE — KIT CULTURE ESWAB AEROBE/ANAEROBE WHITE TOP R723480

## (undated) DEVICE — APPLICATOR COTTON TIP 6"X2 STERILE LF 6012

## (undated) DEVICE — SUCTION CANISTER BEMIS HI FLOW 006772-901

## (undated) DEVICE — SU ETHILON 4-0 PS-2 18" BLACK 1667H

## (undated) DEVICE — PACK MINOR LITHOTOMY RIDGES

## (undated) DEVICE — BLADE KNIFE SURG 10 371110

## (undated) DEVICE — DRSG XEROFORM 1X8"

## (undated) DEVICE — BASIN SET MINOR DISP

## (undated) DEVICE — SOL NACL 0.9% IRRIG 3000ML BAG 2B7477

## (undated) DEVICE — SYR 50ML LL W/O NDL 309653

## (undated) DEVICE — TUBE FEEDING 08FR 40CM FTS8.0-P

## (undated) DEVICE — CATH INTERMITTENT CLEAN-CATH FEMALE 14FR 6" VINYL LF 420614

## (undated) DEVICE — Device

## (undated) DEVICE — BNDG ELASTIC 3"X5YDS UNSTERILE 6611-30

## (undated) DEVICE — SUCTION MANIFOLD NEPTUNE 2 SYS 4 PORT 0702-020-000

## (undated) RX ORDER — PROPOFOL 10 MG/ML
INJECTION, EMULSION INTRAVENOUS
Status: DISPENSED
Start: 2019-02-06

## (undated) RX ORDER — FENTANYL CITRATE 50 UG/ML
INJECTION, SOLUTION INTRAMUSCULAR; INTRAVENOUS
Status: DISPENSED
Start: 2019-02-06

## (undated) RX ORDER — HYDROCODONE BITARTRATE AND ACETAMINOPHEN 5; 325 MG/1; MG/1
TABLET ORAL
Status: DISPENSED
Start: 2019-02-06

## (undated) RX ORDER — SCOLOPAMINE TRANSDERMAL SYSTEM 1 MG/1
PATCH, EXTENDED RELEASE TRANSDERMAL
Status: DISPENSED
Start: 2019-02-06

## (undated) RX ORDER — GLYCOPYRROLATE 0.2 MG/ML
INJECTION INTRAMUSCULAR; INTRAVENOUS
Status: DISPENSED
Start: 2019-02-06

## (undated) RX ORDER — DIPHENHYDRAMINE HYDROCHLORIDE 50 MG/ML
INJECTION INTRAMUSCULAR; INTRAVENOUS
Status: DISPENSED
Start: 2019-02-06

## (undated) RX ORDER — FENTANYL CITRATE 50 UG/ML
INJECTION, SOLUTION INTRAMUSCULAR; INTRAVENOUS
Status: DISPENSED
Start: 2018-10-18

## (undated) RX ORDER — ONDANSETRON 2 MG/ML
INJECTION INTRAMUSCULAR; INTRAVENOUS
Status: DISPENSED
Start: 2019-02-06

## (undated) RX ORDER — LIDOCAINE HYDROCHLORIDE 10 MG/ML
INJECTION, SOLUTION EPIDURAL; INFILTRATION; INTRACAUDAL; PERINEURAL
Status: DISPENSED
Start: 2019-02-06

## (undated) RX ORDER — CEFAZOLIN SODIUM 2 G/100ML
INJECTION, SOLUTION INTRAVENOUS
Status: DISPENSED
Start: 2018-10-18

## (undated) RX ORDER — DEXAMETHASONE SODIUM PHOSPHATE 4 MG/ML
INJECTION, SOLUTION INTRA-ARTICULAR; INTRALESIONAL; INTRAMUSCULAR; INTRAVENOUS; SOFT TISSUE
Status: DISPENSED
Start: 2019-02-06

## (undated) RX ORDER — HYDROMORPHONE HYDROCHLORIDE 1 MG/ML
INJECTION, SOLUTION INTRAMUSCULAR; INTRAVENOUS; SUBCUTANEOUS
Status: DISPENSED
Start: 2019-02-06

## (undated) RX ORDER — PHENAZOPYRIDINE HYDROCHLORIDE 200 MG/1
TABLET, FILM COATED ORAL
Status: DISPENSED
Start: 2019-02-06

## (undated) RX ORDER — HYDROMORPHONE HYDROCHLORIDE 1 MG/ML
INJECTION, SOLUTION INTRAMUSCULAR; INTRAVENOUS; SUBCUTANEOUS
Status: DISPENSED
Start: 2018-10-18